# Patient Record
Sex: FEMALE | Race: WHITE | Employment: OTHER | URBAN - METROPOLITAN AREA
[De-identification: names, ages, dates, MRNs, and addresses within clinical notes are randomized per-mention and may not be internally consistent; named-entity substitution may affect disease eponyms.]

---

## 2017-04-04 ENCOUNTER — MYC MEDICAL ADVICE (OUTPATIENT)
Dept: FAMILY MEDICINE | Facility: CLINIC | Age: 62
End: 2017-04-04

## 2017-04-04 DIAGNOSIS — N64.4 PAIN OF LEFT BREAST: Primary | ICD-10-CM

## 2017-04-04 NOTE — TELEPHONE ENCOUNTER
CO review patient my chart message regarding left breast intermittent pain. Did have mammo 12/16.(negative) Family hx aunt with breast CA

## 2017-04-04 NOTE — TELEPHONE ENCOUNTER
"She is requesting to see a \"breast specialist.\"  We have breast surgeons, but this does not seem like a surgical issue yet.  Who would we refer her to?    "

## 2017-04-07 NOTE — TELEPHONE ENCOUNTER
CO review:  Triage entered the order as instructed by breast center:  Left breast diagnostic 3D mammo, and Left breast US. Please review these orders before sign off.  Arianna Pitts RN

## 2017-04-07 NOTE — TELEPHONE ENCOUNTER
Please call the breast center.  The 3D mammo is supposed to be the more sensitive mammo, but both a regular mammogram and 3D are considered screening mammograms.  If she is having breast pain, the radiologist would likely recommend a diagnostic mammogram.  Please check with the breast center/radiologist on this.  Thank you.

## 2017-04-19 ENCOUNTER — RADIANT APPOINTMENT (OUTPATIENT)
Dept: MAMMOGRAPHY | Facility: CLINIC | Age: 62
End: 2017-04-19
Attending: NURSE PRACTITIONER

## 2017-04-19 DIAGNOSIS — N64.4 PAIN OF LEFT BREAST: ICD-10-CM

## 2017-07-11 ENCOUNTER — OFFICE VISIT (OUTPATIENT)
Dept: FAMILY MEDICINE | Facility: CLINIC | Age: 62
End: 2017-07-11
Payer: COMMERCIAL

## 2017-07-11 VITALS
RESPIRATION RATE: 20 BRPM | DIASTOLIC BLOOD PRESSURE: 71 MMHG | BODY MASS INDEX: 32.96 KG/M2 | HEART RATE: 74 BPM | HEIGHT: 63 IN | TEMPERATURE: 97.9 F | SYSTOLIC BLOOD PRESSURE: 117 MMHG | WEIGHT: 186 LBS | OXYGEN SATURATION: 94 %

## 2017-07-11 DIAGNOSIS — R10.32 LLQ ABDOMINAL PAIN: Primary | ICD-10-CM

## 2017-07-11 PROCEDURE — 99213 OFFICE O/P EST LOW 20 MIN: CPT | Performed by: NURSE PRACTITIONER

## 2017-07-11 NOTE — PROGRESS NOTES
"  SUBJECTIVE:                                                    Valorie Hahn is a 61 year old female who presents to clinic today for the following health issues:      Abdominal Pain      Duration: 10 days     Description (location/character/radiation): approximately 10 days ago Valorie had a sudden onset of lower left sided abdominal pain. Years ago she had a kidney stone and she thought this was a kidney stone. She pushed fluids and the next day the pain was less. Since that time, she has had a persistent pain, low grade. She has felt \"clammy and crappy.\" the pain has not resolved. Today, the pain has been dull, intermittent when standing up but not when she is sitting or laying down.     Her pain is getting better. She almost canceled her appointment for today, but decided to keep it just to be checked.       Associated flank pain: None    Intensity:  10/10    Accompanying signs and symptoms:        Fever/Chills: no        Gas/Bloating: no        Nausea/vomitting: no        Diarrhea: no        Dysuria or Hematuria: no pain. No visible blood.     History (previous similar pain/trauma/previous testing): kidney stones before     Precipitating or alleviating factors:       Pain worse with eating/BM/urination: no- had no appetite afterwards        Pain relieved by BM: no     Therapies tried and outcome: None    LMP:  not applicable      Problem list and histories reviewed & adjusted, as indicated.  Additional history: as documented    Patient Active Problem List   Diagnosis     Herpes simplex vulvovaginitis     Asthma     CARDIOVASCULAR SCREENING; LDL GOAL LESS THAN 160     Family history of thyroid disease     Advanced directives, counseling/discussion     SI (sacroiliac) joint dysfunction     Osteitis, condensans     Sacroiliac joint disease     SI (sacroiliac) pain     Colon polyps     Chronic low back pain     Left hip pain     Dyspnea     Past Surgical History:   Procedure Laterality Date     C " APPENDECTOMY       C NONSPECIFIC PROCEDURE      Anal fissure repair     COLONOSCOPY      some polyps removed     HC TOOTH EXTRACTION W/FORCEP         Social History   Substance Use Topics     Smoking status: Former Smoker     Packs/day: 0.50     Years: 10.00     Types: Cigarettes     Start date: 1990     Quit date: 2000     Smokeless tobacco: Never Used     Alcohol use Yes      Comment: 1-2 glasses of wine per night     Family History   Problem Relation Age of Onset     DIABETES Father      HEART DISEASE Father      bypass surgeries x 2     Respiratory Mother      emphysema     Thyroid Disease Sister      hypothyroid     Thyroid Disease Sister      hypothyroid     Psychotic Disorder Sister      commited suicide, depression     CANCER Brother      esophogeal cancer, work exposure to chemicals     Blood Disease Brother       hiv  aids     Blood Disease Brother      hep c     Blood Disease Brother      hiv positive, passed away     Family History Negative Brother      DIABETES Brother      Mental Illness Mother      Mental Illness Maternal Grandmother      Mental Illness Sister      Thyroid Disease Mother      Thyroid Disease Sister      Thyroid Disease Mother      hypothyroid, takes supplement     Thyroid Disease Sister          Current Outpatient Prescriptions   Medication Sig Dispense Refill     acyclovir (ZOVIRAX) 400 MG tablet Take 1 tablet (400 mg) by mouth 3 times daily (Patient not taking: Reported on 2017) 60 tablet 12     albuterol (PROAIR HFA, PROVENTIL HFA, VENTOLIN HFA) 108 (90 BASE) MCG/ACT inhaler Inhale 2 puffs into the lungs every 4 hours as needed for shortness of breath / dyspnea (Patient not taking: Reported on 2017) 1 Inhaler 11     meloxicam (MOBIC) 7.5 MG tablet Take 1 tablet (7.5 mg) by mouth daily 90 tablet 3     cetirizine (ZYRTEC) 5 MG CHEW Take 5 mg by mouth daily       guaiFENesin-codeine (ROBITUSSIN AC) 100-10 MG/5ML SOLN Take 5-10 mLs by mouth every 4 hours as  "needed for cough 120 mL 0     Cholecalciferol (VITAMIN D3 PO) Take by mouth daily       Ibuprofen (IBU PO) Take  by mouth.       Allergies   Allergen Reactions     Penicillin G      Recent Labs   Lab Test  08/10/16   1106  06/16/15   1010   05/14/13   1217   LDL  123*  96   --   96   HDL  69  67   --   68   TRIG  114  125   --   95   CR   --   0.67   --    --    GFRESTIMATED   --   >90  Non  GFR Calc     --    --    GFRESTBLACK   --   >90   GFR Calc     --    --    POTASSIUM   --   4.2   --    --    TSH  3.08  2.68   < >   --     < > = values in this interval not displayed.      BP Readings from Last 3 Encounters:   07/11/17 117/71   10/03/16 115/78   08/10/16 110/70    Wt Readings from Last 3 Encounters:   07/11/17 186 lb (84.4 kg)   10/03/16 184 lb (83.5 kg)   08/10/16 181 lb (82.1 kg)                  Labs reviewed in EPIC    Reviewed and updated as needed this visit by clinical staff  Allergies       Reviewed and updated as needed this visit by Provider         ROS:  Constitutional, HEENT, cardiovascular, pulmonary, GI, , musculoskeletal, neuro, skin, endocrine and psych systems are negative, except as otherwise noted.    OBJECTIVE:     /71  Pulse 74  Temp 97.9  F (36.6  C) (Oral)  Resp 20  Ht 5' 3\" (1.6 m)  Wt 186 lb (84.4 kg)  LMP 02/14/2010  SpO2 94%  BMI 32.95 kg/m2  Body mass index is 32.95 kg/(m^2).  Constitutional: healthy, alert and no distress  Cardiovascular: RRR. No murmurs, clicks gallops or rub  Respiratory: Respirations easy and regular. No respiratory distress noted. Lung sounds clear to auscultation.  Gastrointestinal: abdomen flat, soft, nontender to light or deep palpation. Bowel sounds active in 4 quadrants. No hepatosplenomegaly. No rebound or guarding.  Neurologic: Gait normal. Reflexes normal and symmetric. Sensation grossly WNL.  Psychiatric: mentation appears normal and affect normal/bright      ASSESSMENT/PLAN:     (R10.32) LLQ abdominal " pain, improving  (primary encounter diagnosis)  Comment:   Plan:   I discussed and reviewed with the patient red flag symptoms and abdominal pain is concerning. Since her pain is improving today and she has no red flag symptoms, she will continue with her day-to-day management.  If she develops vomiting, blood in her stool, any new or concerning symptoms, she will be recheck/go to the Emergency Room.  In the meantime anticipate full resolution.  She declined/denies any labs including blood and urine test today for workup. She prefers to wait to see how things go.  She was appreciative and will follow-up with me as needed.      Patient Instructions     Diverticulitis    Some people get pouches along the wall of the colon as they get older. The pouches, called diverticuli, usually cause no symptoms. If the pouches become blocked, you can get an infection. This infection is called diverticulitis. It causes pain in your lower abdomen and fever. If not treated, it can become a serious condition, causing an abscess to form inside the pouch. The abscess may block the intestinal tract even or rupture, spreading infection throughout the abdomen.  When treatment is started early, oral antibiotics alone may be enough to cure diverticulitis. This method is tried first. But, if you don't improve or if your condition gets worse while using oral antibiotics, you may need to be admitted to the hospital for IV antibiotics. Severe cases may require surgery.  Home care  The following guidelines will help you care for yourself at home:    During the acute illness, rest and follow your healthcare provider's instructions about diet. Sometimes you will need to follow a clear liquid diet to rest your bowel. Once your symptoms are better, you may be told to follow a low-fiber diet for some time. Include foods like:    Flake cereal, mashed potatoes, pancakes, waffles, pasta, white bread, rice, applesauce, bananas, eggs, fish, poultry, tofu,  and cooked soft vegetables    Take antibiotics exactly as instructed. Don't miss any doses or stop taking the medication, even if you feel better.    Monitor your temperature and tell your healthcare provider if you have rising temperatures.  Preventing future attacks  Once you have an episode of diverticulitis, you are at risk for having it again. After you have recovered from this episode, you may be able to lower your risk by eating a high-fiber diet (20 gm/day to 35 gm/day of fiber). This cleans out the colon pouches that already exist and may prevent new ones from forming. Foods high in fiber include fresh fruits and edible peelings, raw or lightly cooked vegetables, whole grain cereals and breads, dried beans and peas, and bran.  Other steps that can help prevent future attacks include:    Take your medicines, such as antibiotics, as your healthcare provider says.    Drink 6 to 8 glasses of water every day, unless told otherwise.    Use a heating pad or hot water bottle to help abdominal cramping or pain.    Begin an exercise program. Ask your healthcare provider how to get started. You can benefit from simple activities such as walking or gardening.    Treat diarrhea with a bland diet. Start with liquids only; then slowly add fiber over time.    Watch for changes in your bowel movements (constipation to diarrhea). Avoid constipation by eating a high fiber diet and taking a stool softener if needed.    Get plenty of rest and sleep.  Follow-up care  Follow up with your healthcare provider as advised or sooner if you are not getting better in the next 2 days.  When to seek medical advice  Call your healthcare provider right away if any of these occur:    Fever of 100.4 F (38 C) or higher, or as directed by your healthcare provider    Repeated vomiting or swelling of the abdomen    Weakness, dizziness, light-headedness    Pain in your abdomen that gets worse, severe, or spreads to your back    Pain that moves to  the right lower abdomen    Rectal bleeding (stools that are red, black or maroon color)    Unexpected vaginal bleeding  Date Last Reviewed: 9/1/2016 2000-2017 [employer]. [address] [place] [address], [place] [address]. All rights reserved. This information is not intended as a substitute for professional medical care. Always follow your healthcare professional's instructions.              [facility]

## 2017-07-11 NOTE — PATIENT INSTRUCTIONS
Diverticulitis    Some people get pouches along the wall of the colon as they get older. The pouches, called diverticuli, usually cause no symptoms. If the pouches become blocked, you can get an infection. This infection is called diverticulitis. It causes pain in your lower abdomen and fever. If not treated, it can become a serious condition, causing an abscess to form inside the pouch. The abscess may block the intestinal tract even or rupture, spreading infection throughout the abdomen.  When treatment is started early, oral antibiotics alone may be enough to cure diverticulitis. This method is tried first. But, if you don't improve or if your condition gets worse while using oral antibiotics, you may need to be admitted to the hospital for IV antibiotics. Severe cases may require surgery.  Home care  The following guidelines will help you care for yourself at home:    During the acute illness, rest and follow your healthcare provider's instructions about diet. Sometimes you will need to follow a clear liquid diet to rest your bowel. Once your symptoms are better, you may be told to follow a low-fiber diet for some time. Include foods like:    Flake cereal, mashed potatoes, pancakes, waffles, pasta, white bread, rice, applesauce, bananas, eggs, fish, poultry, tofu, and cooked soft vegetables    Take antibiotics exactly as instructed. Don't miss any doses or stop taking the medication, even if you feel better.    Monitor your temperature and tell your healthcare provider if you have rising temperatures.  Preventing future attacks  Once you have an episode of diverticulitis, you are at risk for having it again. After you have recovered from this episode, you may be able to lower your risk by eating a high-fiber diet (20 gm/day to 35 gm/day of fiber). This cleans out the colon pouches that already exist and may prevent new ones from forming. Foods high in fiber include fresh fruits and edible peelings, raw or  lightly cooked vegetables, whole grain cereals and breads, dried beans and peas, and bran.  Other steps that can help prevent future attacks include:    Take your medicines, such as antibiotics, as your healthcare provider says.    Drink 6 to 8 glasses of water every day, unless told otherwise.    Use a heating pad or hot water bottle to help abdominal cramping or pain.    Begin an exercise program. Ask your healthcare provider how to get started. You can benefit from simple activities such as walking or gardening.    Treat diarrhea with a bland diet. Start with liquids only; then slowly add fiber over time.    Watch for changes in your bowel movements (constipation to diarrhea). Avoid constipation by eating a high fiber diet and taking a stool softener if needed.    Get plenty of rest and sleep.  Follow-up care  Follow up with your healthcare provider as advised or sooner if you are not getting better in the next 2 days.  When to seek medical advice  Call your healthcare provider right away if any of these occur:    Fever of 100.4 F (38 C) or higher, or as directed by your healthcare provider    Repeated vomiting or swelling of the abdomen    Weakness, dizziness, light-headedness    Pain in your abdomen that gets worse, severe, or spreads to your back    Pain that moves to the right lower abdomen    Rectal bleeding (stools that are red, black or maroon color)    Unexpected vaginal bleeding  Date Last Reviewed: 9/1/2016 2000-2017 [employer]. [address] [place] [address]. All rights reserved. This information is not intended as a substitute for professional medical care. Always follow your healthcare professional's instructions.

## 2017-07-11 NOTE — NURSING NOTE
"Chief Complaint   Patient presents with     Abdominal Pain       Initial /71  Pulse 74  Temp 97.9  F (36.6  C) (Oral)  Resp 20  Ht 5' 3\" (1.6 m)  Wt 186 lb (84.4 kg)  LMP 02/14/2010  SpO2 94%  BMI 32.95 kg/m2 Estimated body mass index is 32.95 kg/(m^2) as calculated from the following:    Height as of this encounter: 5' 3\" (1.6 m).    Weight as of this encounter: 186 lb (84.4 kg).  Medication Reconciliation: complete     Yuri Rodriguez MA     "

## 2017-07-11 NOTE — MR AVS SNAPSHOT
After Visit Summary   7/11/2017    Valorie Hahn    MRN: [id number]           Patient Information     Date Of Birth          1955        Visit Information        Provider Department      7/11/2017 3:00 PM Lisbeth Ruiz APRN [facility]        Care Instructions      Diverticulitis    Some people get pouches along the wall of the colon as they get older. The pouches, called diverticuli, usually cause no symptoms. If the pouches become blocked, you can get an infection. This infection is called diverticulitis. It causes pain in your lower abdomen and fever. If not treated, it can become a serious condition, causing an abscess to form inside the pouch. The abscess may block the intestinal tract even or rupture, spreading infection throughout the abdomen.  When treatment is started early, oral antibiotics alone may be enough to cure diverticulitis. This method is tried first. But, if you don't improve or if your condition gets worse while using oral antibiotics, you may need to be admitted to the hospital for IV antibiotics. Severe cases may require surgery.  Home care  The following guidelines will help you care for yourself at home:    During the acute illness, rest and follow your healthcare provider's instructions about diet. Sometimes you will need to follow a clear liquid diet to rest your bowel. Once your symptoms are better, you may be told to follow a low-fiber diet for some time. Include foods like:    Flake cereal, mashed potatoes, pancakes, waffles, pasta, white bread, rice, applesauce, bananas, eggs, fish, poultry, tofu, and cooked soft vegetables    Take antibiotics exactly as instructed. Don't miss any doses or stop taking the medication, even if you feel better.    Monitor your temperature and tell your healthcare provider if you have rising temperatures.  Preventing future attacks  Once you have an episode of diverticulitis, you are at risk for  having it again. After you have recovered from this episode, you may be able to lower your risk by eating a high-fiber diet (20 gm/day to 35 gm/day of fiber). This cleans out the colon pouches that already exist and may prevent new ones from forming. Foods high in fiber include fresh fruits and edible peelings, raw or lightly cooked vegetables, whole grain cereals and breads, dried beans and peas, and bran.  Other steps that can help prevent future attacks include:    Take your medicines, such as antibiotics, as your healthcare provider says.    Drink 6 to 8 glasses of water every day, unless told otherwise.    Use a heating pad or hot water bottle to help abdominal cramping or pain.    Begin an exercise program. Ask your healthcare provider how to get started. You can benefit from simple activities such as walking or gardening.    Treat diarrhea with a bland diet. Start with liquids only; then slowly add fiber over time.    Watch for changes in your bowel movements (constipation to diarrhea). Avoid constipation by eating a high fiber diet and taking a stool softener if needed.    Get plenty of rest and sleep.  Follow-up care  Follow up with your healthcare provider as advised or sooner if you are not getting better in the next 2 days.  When to seek medical advice  Call your healthcare provider right away if any of these occur:    Fever of 100.4 F (38 C) or higher, or as directed by your healthcare provider    Repeated vomiting or swelling of the abdomen    Weakness, dizziness, light-headedness    Pain in your abdomen that gets worse, severe, or spreads to your back    Pain that moves to the right lower abdomen    Rectal bleeding (stools that are red, black or maroon color)    Unexpected vaginal bleeding  Date Last Reviewed: 9/1/2016 2000-2017 [employer]. [address] [place] [address], [place] [address]. All rights reserved. This information is not intended as a substitute for professional medical care.  "Always follow your healthcare professional's instructions.                Follow-ups after your visit        Who to contact     If you have questions or need follow up information about today's clinic visit or your schedule please contact [facility] directly at [phone].  Normal or non-critical lab and imaging results will be communicated to you by MyChart, letter or phone within 4 business days after the clinic has received the results. If you do not hear from us within 7 days, please contact the clinic through [employer] or phone. If you have a critical or abnormal lab result, we will notify you by phone as soon as possible.  Submit refill requests through [employer] or call your pharmacy and they will forward the refill request to us. Please allow 3 business days for your refill to be completed.          Additional Information About Your Visit        [employer] Information     [employer] gives you secure access to your electronic health record. If you see a primary care provider, you can also send messages to your care team and make appointments. If you have questions, please call your primary care clinic.  If you do not have a primary care provider, please call [phone] and they will assist you.        Care EveryWhere ID     This is your Care EveryWhere ID. This could be used by other organizations to access your [place] medical records  [id number]        Your Vitals Were     Pulse Temperature Respirations Height Last Period Pulse Oximetry    74 97.9  F (36.6  C) (Oral) 20 5' 3\" (1.6 m) 02/14/2010 94%    BMI (Body Mass Index)                   32.95 kg/m2            Blood Pressure from Last 3 Encounters:   07/11/17 117/71   10/03/16 115/78   08/10/16 110/70    Weight from Last 3 Encounters:   07/11/17 186 lb (84.4 kg)   10/03/16 184 lb (83.5 kg)   08/10/16 181 lb (82.1 kg)              Today, you had the following     No orders found for display         Today's Medication Changes        "   These changes are accurate as of: 7/11/17  3:23 PM.  If you have any questions, ask your nurse or doctor.               Stop taking these medicines if you haven't already. Please contact your care team if you have questions.     fluticasone 110 MCG/ACT Inhaler   Commonly known as:  FLOVENT HFA                    Primary Care Provider Office Phone # Fax #    [facility] [phone]       [facility] [address] [place] [address]        Equal Access to Services     [facility]: Hadii aad ku hadasho Soomaali, waaxda luqadaha, qaybta kaalmada adeegyada, waxay pam bangura . So [facility] [phone].    ATENCIÓN: Si habla español, tiene a mas disposición servicios gratuitos de asistencia lingüística. [facility] [phone].    We comply with applicable federal civil rights laws and [place] laws. We do not discriminate on the basis of race, color, national origin, age, disability sex, sexual orientation or gender identity.            Thank you!     Thank you for choosing [facility]  for your care. Our goal is always to provide you with excellent care. Hearing back from our patients is one way we can continue to improve our services. Please take a few minutes to complete the written survey that you may receive in the mail after your visit with us. Thank you!             Your Updated Medication List - Protect others around you: Learn how to safely use, store and throw away your medicines at www.disposemymeds.org.          This list is accurate as of: 7/11/17  3:23 PM.  Always use your most recent med list.                   Brand Name Dispense Instructions for use Diagnosis    acyclovir 400 MG tablet    ZOVIRAX    60 tablet    Take 1 tablet (400 mg) by mouth 3 times daily    Herpes simplex vulvovaginitis       albuterol 108 (90 BASE) MCG/ACT Inhaler    PROAIR HFA/PROVENTIL HFA/VENTOLIN HFA    1 Inhaler    Inhale 2 puffs  into the lungs every 4 hours as needed for shortness of breath / dyspnea    Mild intermittent asthma without complication       cetirizine 5 MG Chew    zyrTEC     Take 5 mg by mouth daily        guaiFENesin-codeine 100-10 MG/5ML Soln solution    ROBITUSSIN AC    120 mL    Take 5-10 mLs by mouth every 4 hours as needed for cough    Acute sinusitis with symptoms > 10 days       IBU PO      Take  by mouth.        meloxicam 7.5 MG tablet    MOBIC    90 tablet    Take 1 tablet (7.5 mg) by mouth daily    SI (sacroiliac) joint dysfunction       VITAMIN D3 PO      Take by mouth daily

## 2017-07-12 ASSESSMENT — ASTHMA QUESTIONNAIRES: ACT_TOTALSCORE: 24

## 2017-08-02 ENCOUNTER — OFFICE VISIT (OUTPATIENT)
Dept: PEDIATRICS | Facility: CLINIC | Age: 62
End: 2017-08-02
Payer: COMMERCIAL

## 2017-08-02 VITALS
HEIGHT: 63 IN | HEART RATE: 72 BPM | OXYGEN SATURATION: 95 % | BODY MASS INDEX: 32.67 KG/M2 | TEMPERATURE: 98 F | DIASTOLIC BLOOD PRESSURE: 66 MMHG | SYSTOLIC BLOOD PRESSURE: 118 MMHG | WEIGHT: 184.4 LBS

## 2017-08-02 DIAGNOSIS — Z87.442 H/O RENAL CALCULI: ICD-10-CM

## 2017-08-02 DIAGNOSIS — R10.9 LEFT FLANK PAIN: ICD-10-CM

## 2017-08-02 DIAGNOSIS — R10.32 LLQ ABDOMINAL PAIN: Primary | ICD-10-CM

## 2017-08-02 DIAGNOSIS — J45.909 UNCOMPLICATED ASTHMA, UNSPECIFIED ASTHMA SEVERITY: ICD-10-CM

## 2017-08-02 LAB
ALBUMIN SERPL-MCNC: 4 G/DL (ref 3.4–5)
ALBUMIN UR-MCNC: NEGATIVE MG/DL
ALP SERPL-CCNC: 69 U/L (ref 40–150)
ALT SERPL W P-5'-P-CCNC: 21 U/L (ref 0–50)
ANION GAP SERPL CALCULATED.3IONS-SCNC: 3 MMOL/L (ref 3–14)
APPEARANCE UR: CLEAR
AST SERPL W P-5'-P-CCNC: 22 U/L (ref 0–45)
BASOPHILS # BLD AUTO: 0 10E9/L (ref 0–0.2)
BASOPHILS NFR BLD AUTO: 0.5 %
BILIRUB SERPL-MCNC: 1.1 MG/DL (ref 0.2–1.3)
BILIRUB UR QL STRIP: NEGATIVE
BUN SERPL-MCNC: 11 MG/DL (ref 7–30)
CALCIUM SERPL-MCNC: 9.2 MG/DL (ref 8.5–10.1)
CHLORIDE SERPL-SCNC: 104 MMOL/L (ref 94–109)
CO2 SERPL-SCNC: 27 MMOL/L (ref 20–32)
COLOR UR AUTO: YELLOW
CREAT SERPL-MCNC: 0.6 MG/DL (ref 0.52–1.04)
CRP SERPL-MCNC: <2.9 MG/L (ref 0–8)
DIFFERENTIAL METHOD BLD: NORMAL
EOSINOPHIL # BLD AUTO: 0.2 10E9/L (ref 0–0.7)
EOSINOPHIL NFR BLD AUTO: 3.8 %
ERYTHROCYTE [DISTWIDTH] IN BLOOD BY AUTOMATED COUNT: 11.3 % (ref 10–15)
ERYTHROCYTE [SEDIMENTATION RATE] IN BLOOD BY WESTERGREN METHOD: 8 MM/H (ref 0–30)
GFR SERPL CREATININE-BSD FRML MDRD: >90 ML/MIN/1.7M2
GLUCOSE SERPL-MCNC: 89 MG/DL (ref 70–99)
GLUCOSE UR STRIP-MCNC: NEGATIVE MG/DL
HCT VFR BLD AUTO: 39.9 % (ref 35–47)
HGB BLD-MCNC: 13.8 G/DL (ref 11.7–15.7)
HGB UR QL STRIP: NEGATIVE
KETONES UR STRIP-MCNC: NEGATIVE MG/DL
LEUKOCYTE ESTERASE UR QL STRIP: NEGATIVE
LYMPHOCYTES # BLD AUTO: 2.3 10E9/L (ref 0.8–5.3)
LYMPHOCYTES NFR BLD AUTO: 37.1 %
MCH RBC QN AUTO: 31.6 PG (ref 26.5–33)
MCHC RBC AUTO-ENTMCNC: 34.6 G/DL (ref 31.5–36.5)
MCV RBC AUTO: 91 FL (ref 78–100)
MONOCYTES # BLD AUTO: 0.4 10E9/L (ref 0–1.3)
MONOCYTES NFR BLD AUTO: 7 %
NEUTROPHILS # BLD AUTO: 3.2 10E9/L (ref 1.6–8.3)
NEUTROPHILS NFR BLD AUTO: 51.6 %
NITRATE UR QL: NEGATIVE
PH UR STRIP: 6 PH (ref 5–7)
PLATELET # BLD AUTO: 229 10E9/L (ref 150–450)
POTASSIUM SERPL-SCNC: 3.9 MMOL/L (ref 3.4–5.3)
PROT SERPL-MCNC: 7.2 G/DL (ref 6.8–8.8)
RBC # BLD AUTO: 4.37 10E12/L (ref 3.8–5.2)
SODIUM SERPL-SCNC: 134 MMOL/L (ref 133–144)
SP GR UR STRIP: <=1.005 (ref 1–1.03)
URN SPEC COLLECT METH UR: NORMAL
UROBILINOGEN UR STRIP-ACNC: 0.2 EU/DL (ref 0.2–1)
WBC # BLD AUTO: 6.3 10E9/L (ref 4–11)

## 2017-08-02 PROCEDURE — 85652 RBC SED RATE AUTOMATED: CPT | Performed by: PHYSICIAN ASSISTANT

## 2017-08-02 PROCEDURE — 80053 COMPREHEN METABOLIC PANEL: CPT | Performed by: PHYSICIAN ASSISTANT

## 2017-08-02 PROCEDURE — 36415 COLL VENOUS BLD VENIPUNCTURE: CPT | Performed by: PHYSICIAN ASSISTANT

## 2017-08-02 PROCEDURE — 85025 COMPLETE CBC W/AUTO DIFF WBC: CPT | Performed by: PHYSICIAN ASSISTANT

## 2017-08-02 PROCEDURE — 99214 OFFICE O/P EST MOD 30 MIN: CPT | Performed by: PHYSICIAN ASSISTANT

## 2017-08-02 PROCEDURE — 81003 URINALYSIS AUTO W/O SCOPE: CPT | Performed by: PHYSICIAN ASSISTANT

## 2017-08-02 PROCEDURE — 86140 C-REACTIVE PROTEIN: CPT | Performed by: PHYSICIAN ASSISTANT

## 2017-08-02 RX ORDER — TAMSULOSIN HYDROCHLORIDE 0.4 MG/1
0.4 CAPSULE ORAL DAILY
Qty: 15 CAPSULE | Refills: 0 | Status: SHIPPED | OUTPATIENT
Start: 2017-08-02 | End: 2018-04-17

## 2017-08-02 RX ORDER — TRAMADOL HYDROCHLORIDE 50 MG/1
50 TABLET ORAL EVERY 6 HOURS PRN
Qty: 15 TABLET | Refills: 0 | Status: SHIPPED | OUTPATIENT
Start: 2017-08-02 | End: 2018-04-17

## 2017-08-02 RX ORDER — HYDROCODONE BITARTRATE AND ACETAMINOPHEN 5; 325 MG/1; MG/1
1 TABLET ORAL EVERY 4 HOURS PRN
Qty: 15 TABLET | Refills: 0 | Status: SHIPPED | OUTPATIENT
Start: 2017-08-02 | End: 2017-08-02

## 2017-08-02 ASSESSMENT — ANXIETY QUESTIONNAIRES
6. BECOMING EASILY ANNOYED OR IRRITABLE: SEVERAL DAYS
5. BEING SO RESTLESS THAT IT IS HARD TO SIT STILL: NOT AT ALL
GAD7 TOTAL SCORE: 5
1. FEELING NERVOUS, ANXIOUS, OR ON EDGE: SEVERAL DAYS
2. NOT BEING ABLE TO STOP OR CONTROL WORRYING: SEVERAL DAYS
IF YOU CHECKED OFF ANY PROBLEMS ON THIS QUESTIONNAIRE, HOW DIFFICULT HAVE THESE PROBLEMS MADE IT FOR YOU TO DO YOUR WORK, TAKE CARE OF THINGS AT HOME, OR GET ALONG WITH OTHER PEOPLE: NOT DIFFICULT AT ALL
7. FEELING AFRAID AS IF SOMETHING AWFUL MIGHT HAPPEN: NOT AT ALL
3. WORRYING TOO MUCH ABOUT DIFFERENT THINGS: SEVERAL DAYS

## 2017-08-02 ASSESSMENT — PATIENT HEALTH QUESTIONNAIRE - PHQ9: 5. POOR APPETITE OR OVEREATING: SEVERAL DAYS

## 2017-08-02 NOTE — NURSING NOTE
"Chief Complaint   Patient presents with     Abdominal Pain     lower left quadrant       Initial /66 (BP Location: Right arm, Patient Position: Chair, Cuff Size: Adult Regular)  Pulse 72  Temp 98  F (36.7  C) (Oral)  Ht 5' 3\" (1.6 m)  Wt 184 lb 6.4 oz (83.6 kg)  LMP 02/14/2010  SpO2 95%  BMI 32.66 kg/m2 Estimated body mass index is 32.66 kg/(m^2) as calculated from the following:    Height as of this encounter: 5' 3\" (1.6 m).    Weight as of this encounter: 184 lb 6.4 oz (83.6 kg).  Medication Reconciliation: complete   Teetee Aguilera MA      "

## 2017-08-02 NOTE — PATIENT INSTRUCTIONS
Increase fluid intake  Begin flomax daily  Begin vicodin as needed    Call in 3-5 days if symptoms persist

## 2017-08-02 NOTE — LETTER
My Asthma Action Plan  Name: Valorie Hahn   YOB: 1955  Date: 8/2/2017   My doctor: Catracho Denis PA-C   My clinic: [facility]        My Control Medicine: N/A  My Rescue Medicine: Albuterol (Proair/Ventolin/Proventil) inhaler 108 (90 Base)   My Asthma Severity: Asthma  Avoid your asthma triggers: Patient is aware of triggers               GREEN ZONE   Good Control    I feel good    No cough or wheeze    Can work, sleep and play without asthma symptoms       Take your asthma control medicine every day.     1. If exercise triggers your asthma, take your rescue medication    15 minutes before exercise or sports, and    During exercise if you have asthma symptoms  2. Spacer to use with inhaler: If you have a spacer, make sure to use it with your inhaler             YELLOW ZONE Getting Worse  I have ANY of these:    I do not feel good    Cough or wheeze    Chest feels tight    Wake up at night   1. Keep taking your Green Zone medications  2. Start taking your rescue medicine:    every 20 minutes for up to 1 hour. Then every 4 hours for 24-48 hours.  3. If you stay in the Yellow Zone for more than 12-24 hours, contact your doctor.  4. If you do not return to the Green Zone in 12-24 hours or you get worse, start taking your oral steroid medicine if prescribed by your provider.           RED ZONE Medical Alert - Get Help  I have ANY of these:    I feel awful    Medicine is not helping    Breathing getting harder    Trouble walking or talking    Nose opens wide to breathe       1. Take your rescue medicine NOW  2. If your provider has prescribed an oral steroid medicine, start taking it NOW  3. Call your doctor NOW  4. If you are still in the Red Zone after 20 minutes and you have not reached your doctor:    Take your rescue medicine again and    Call 911 or go to the emergency room right away    See your regular doctor within 2 weeks of an Emergency Room or Urgent Care visit  for follow-up treatment.        Electronically signed by: Teetee Aguilera, August 2, 2017    Annual Reminders:  Meet with Asthma Educator,  Flu Shot in the Fall, consider Pneumonia Vaccination for patients with asthma (aged 19 and older).    [facility] [address] [address] [address]                    Asthma Triggers  How To Control Things That Make Your Asthma Worse    Triggers are things that make your asthma worse.  Look at the list below to help you find your triggers and what you can do about them.  You can help prevent asthma flare-ups by staying away from your triggers.      Trigger                                                          What you can do   Cigarette Smoke  Tobacco smoke can make asthma worse. Do not allow smoking in your home, car or around you.  Be sure no one smokes at a child s day care or school.  If you smoke, ask your health care provider for ways to help you quit.  Ask family members to quit too.  Ask your health care provider for a referral to Quit Plan to help you quit smoking, or call 7-687-403-PLAN.     Colds, Flu, Bronchitis  These are common triggers of asthma. Wash your hands often.  Don t touch your eyes, nose or mouth.  Get a flu shot every year.     Dust Mites  These are tiny bugs that live in cloth or carpet. They are too small to see. Wash sheets and blankets in hot water every week.   Encase pillows and mattress in dust mite proof covers.  Avoid having carpet if you can. If you have carpet, vacuum weekly.   Use a dust mask and HEPA vacuum.   Pollen and Outdoor Mold  Some people are allergic to trees, grass, or weed pollen, or molds. Try to keep your windows closed.  Limit time out doors when pollen count is high.   Ask you health care provider about taking medicine during allergy season.     Animal Dander  Some people are allergic to skin flakes, urine or saliva from pets with fur or feathers. Keep pets  with fur or feathers out of your home.    If you can t keep the pet outdoors, then keep the pet out of your bedroom.  Keep the bedroom door closed.  Keep pets off cloth furniture and away from stuffed toys.     Mice, Rats, and Cockroaches  Some people are allergic to the waste from these pests.   Cover food and garbage.  Clean up spills and food crumbs.  Store grease in the refrigerator.   Keep food out of the bedroom.   Indoor Mold  This can be a trigger if your home has high moisture. Fix leaking faucets, pipes, or other sources of water.   Clean moldy surfaces.  Dehumidify basement if it is damp and smelly.   Smoke, Strong Odors, and Sprays  These can reduce air quality. Stay away from strong odors and sprays, such as perfume, powder, hair spray, paints, smoke incense, paint, cleaning products, candles and new carpet.   Exercise or Sports  Some people with asthma have this trigger. Be active!  Ask your doctor about taking medicine before sports or exercise to prevent symptoms.    Warm up for 5-10 minutes before and after sports or exercise.     Other Triggers of Asthma  Cold air:  Cover your nose and mouth with a scarf.  Sometimes laughing or crying can be a trigger.  Some medicines and food can trigger asthma.

## 2017-08-02 NOTE — MR AVS SNAPSHOT
After Visit Summary   8/2/2017    Valorie Hahn    MRN: [id number]           Patient Information     Date Of Birth          1955        Visit Information        Provider Department      8/2/2017 2:30 PM Catracho Denis PA-C [facility]        Today's Diagnoses     Asthma    -  1    LLQ abdominal pain        Left flank pain          Care Instructions    Increase fluid intake  Begin flomax daily  Begin vicodin as needed    Call in 3-5 days if symptoms persist          Follow-ups after your visit        Who to contact     If you have questions or need follow up information about today's clinic visit or your schedule [facility] at [phone].  Normal or non-critical lab and imaging results will be communicated to you by Soundtrackerhart, letter or phone within 4 business days after the clinic has received the results. If you do not hear from us within 7 days, please contact the clinic through Soundtrackerhart or phone. If you have a critical or abnormal lab result, we will notify you by phone as soon as possible.  Submit refill requests through linkedÃ¼ or call your pharmacy and they will forward the refill request to us. Please allow 3 business days for your refill to be completed.          Additional Information About Your Visit        MyChart Information     linkedÃ¼ gives you secure access to your electronic health record. If you see a primary care provider, you can also send messages to your care team and make appointments. If you have questions, please call your primary care clinic.  If you do not have a primary care provider, please call [phone] and they will assist you.        Care EveryWhere ID     This is your Care EveryWhere ID. This could be used by other organizations to access your [facility] medical records  [id number]        Your Vitals Were     Pulse Temperature Height Last Period Pulse Oximetry BMI (Body Mass Index)    72 98  F  "(36.7  C) (Oral) 5' 3\" (1.6 m) 02/14/2010 95% 32.66 kg/m2       Blood Pressure from Last 3 Encounters:   08/02/17 118/66   07/11/17 117/71   10/03/16 115/78    Weight from Last 3 Encounters:   08/02/17 184 lb 6.4 oz (83.6 kg)   07/11/17 186 lb (84.4 kg)   10/03/16 184 lb (83.5 kg)              We Performed the Following     *UA reflex to Microscopic     Asthma Action Plan (AAP)     CBC with platelets differential     Comprehensive metabolic panel     CRP inflammation     Erythrocyte sedimentation rate auto          Today's Medication Changes          These changes are accurate as of: 8/2/17  3:36 PM.  If you have any questions, ask your nurse or doctor.               Start taking these medicines.        Dose/Directions    HYDROcodone-acetaminophen 5-325 MG per tablet   Commonly known as:  NORCO   Used for:  LLQ abdominal pain, Left flank pain   Started by:  Catracho Denis PA-C        Dose:  1 tablet   Take 1 tablet by mouth every 4 hours as needed for pain   Quantity:  15 tablet   Refills:  0       tamsulosin 0.4 MG capsule   Commonly known as:  FLOMAX   Used for:  LLQ abdominal pain   Started by:  Catracho Denis PA-C        Dose:  0.4 mg   Take 1 capsule (0.4 mg) by mouth daily   Quantity:  15 capsule   Refills:  0            Where to get your medicines      [facility] [address] [place] [id number]    Hours:  24-hours Phone:  [phone]     tamsulosin 0.4 MG capsule         Some of these will need a paper prescription and others can be bought over the counter.  Ask your nurse if you have questions.     Bring a paper prescription for each of these medications     HYDROcodone-acetaminophen 5-325 MG per tablet                Primary Care Provider Office Phone # Fax #    BC Benedict [phone]       [facility] " [address] [place] [address]        Equal Access to Services     ELISEOFREDERICK TRUDI : Hadii aad ku hadannabelleo Soalvaali, waaxda luqadaha, qaybta kaalmada davidjavier, waxxochitl lelain hayaajeramie hernandezjudit kaisermaria sandoval. So [facility] [phone].    ATENCIÓN: Si habla español, tiene a mas disposición servicios gratuitos de asistencia lingüística. [facility] [phone].    We comply with applicable federal civil rights laws and [place] laws. We do not discriminate on the basis of race, color, national origin, age, disability sex, sexual orientation or gender identity.            Thank you!     Thank you for choosing [facility]  for your care. Our goal is always to provide you with excellent care. Hearing back from our patients is one way we can continue to improve our services. Please take a few minutes to complete the written survey that you may receive in the mail after your visit with us. Thank you!             Your Updated Medication List - Protect others around you: Learn how to safely use, store and throw away your medicines at www.disposemymeds.org.          This list is accurate as of: 8/2/17  3:36 PM.  Always use your most recent med list.                   Brand Name Dispense Instructions for use Diagnosis    acyclovir 400 MG tablet    ZOVIRAX    60 tablet    Take 1 tablet (400 mg) by mouth 3 times daily    Herpes simplex vulvovaginitis       albuterol 108 (90 BASE) MCG/ACT Inhaler    PROAIR HFA/PROVENTIL HFA/VENTOLIN HFA    1 Inhaler    Inhale 2 puffs into the lungs every 4 hours as needed for shortness of breath / dyspnea    Mild intermittent asthma without complication       cetirizine 5 MG Chew    zyrTEC     Take 5 mg by mouth daily        guaiFENesin-codeine 100-10 MG/5ML Soln solution    ROBITUSSIN AC    120 mL    Take 5-10 mLs by mouth every 4 hours as needed for cough    Acute sinusitis with symptoms > 10 days       HYDROcodone-acetaminophen 5-325 MG per tablet    NORCO    15 tablet    Take 1  tablet by mouth every 4 hours as needed for pain    LLQ abdominal pain, Left flank pain       IBU PO      Take  by mouth.        meloxicam 7.5 MG tablet    MOBIC    90 tablet    Take 1 tablet (7.5 mg) by mouth daily    SI (sacroiliac) joint dysfunction       tamsulosin 0.4 MG capsule    FLOMAX    15 capsule    Take 1 capsule (0.4 mg) by mouth daily    LLQ abdominal pain       VITAMIN D3 PO      Take by mouth daily

## 2017-08-02 NOTE — PROGRESS NOTES
"  SUBJECTIVE:                                                    Valorie Hahn is a 61 year old female who presents to clinic today for the following health issues:    ABDOMINAL and FLANK PAIN     Onset:1 day ago-7pm originally started 3 weeks ago but went away    Description:   Character: aching  Location: left lower quadrant; left flank  Radiation: Back and Pelvic region  Last night severe pain    Intensity: moderate; last night 10/10; sitting 0/10; immediate pain with standing    Progression of Symptoms:  same    Accompanying Signs & Symptoms:  Fever/Chills?: no   Gas/Bloating: no   Nausea: yes  Vomitting: no   Diarrhea?: no   Constipation:no   Dysuria or Hematuria: no    History:   Trauma: no   Previous similar pain: YES- history of kidney stones and similar pain 3 weeks ago   Previous tests done: none    Precipitating factors:   Does the pain change with:     Food: no -haven't eaten since yesterday    BM: no     Urination: no   Standing is worse than sitting    Alleviating factors:  siting    Therapies Tried and outcome: sitting and Advil at 8 am    LMP:  not applicable     History of kidney stones. History of diverticula on colonoscopy.   S/p appendectomy. No history of gallstones, gout, ulcers, gastritis, pancreatitis, diverticulitis. No bowel obstructions.     ROS:  C: NEGATIVE for fever, chills  E/M: NEGATIVE for ear, mouth and throat problems  R: NEGATIVE for significant cough or SOB  CV: NEGATIVE for chest pain  GI: POSITIVE for LLQ, L flank pain, nausea  MUSCULOSKELETAL: NEGATIVE for significant arthralgias or myalgia  NEURO: NEGATIVE for weakness, dizziness or paresthesias    OBJECTIVE:                                                    /66 (BP Location: Right arm, Patient Position: Chair, Cuff Size: Adult Regular)  Pulse 72  Temp 98  F (36.7  C) (Oral)  Ht 5' 3\" (1.6 m)  Wt 184 lb 6.4 oz (83.6 kg)  LMP 02/14/2010  SpO2 95%  BMI 32.66 kg/m2  Body mass index is 32.66 kg/(m^2). "   GENERAL: alert, no distress  HENT: Mouth- no ulcers, no lesions  NECK: no tenderness, no adenopathy  RESP: lungs clear to auscultation - no rales, no rhonchi, no wheezes  CV: regular rates and rhythm, normal S1 S2, no S3 or S4 and no murmur, no click or rub  ABDOMEN: soft, LLQ tenderness, no  hepatosplenomegaly, no masses, normal bowel sounds  BACK: no CVAT    Diagnostic test results:  No results found for this or any previous visit (from the past 24 hour(s)).       ASSESSMENT/PLAN:                                                    (R10.32) LLQ abdominal pain  (primary encounter diagnosis)  Comment: likely renal lithiasis. Proceed with increased fluids, flomax and tramadol PRN pain. If symptoms persist in 5-7 days, call clinic and will proceed with imaging.   Plan: tamsulosin (FLOMAX) 0.4 MG capsule, traMADol         (ULTRAM) 50 MG tablet, DISCONTINUED:         HYDROcodone-acetaminophen (NORCO) 5-325 MG per         tablet            (R10.9) Left flank pain  Comment:   Plan: CBC with platelets differential, Erythrocyte         sedimentation rate auto, *UA reflex to         Microscopic, Comprehensive metabolic panel, CRP        inflammation, traMADol (ULTRAM) 50 MG tablet,         DISCONTINUED: HYDROcodone-acetaminophen (NORCO)        5-325 MG per tablet            (Z87.442) H/O renal calculi  Comment:   Plan:       (J45.909) Asthma  Comment:   Plan: Asthma Action Plan (AAP)            See Patient Instructions    Catracho Denis PA-C  [facility]

## 2017-08-03 ASSESSMENT — ANXIETY QUESTIONNAIRES: GAD7 TOTAL SCORE: 5

## 2017-08-03 ASSESSMENT — PATIENT HEALTH QUESTIONNAIRE - PHQ9: SUM OF ALL RESPONSES TO PHQ QUESTIONS 1-9: 0

## 2017-08-20 ENCOUNTER — OFFICE VISIT (OUTPATIENT)
Dept: URGENT CARE | Facility: URGENT CARE | Age: 62
End: 2017-08-20
Payer: COMMERCIAL

## 2017-08-20 VITALS
SYSTOLIC BLOOD PRESSURE: 135 MMHG | BODY MASS INDEX: 32.06 KG/M2 | WEIGHT: 181 LBS | HEART RATE: 67 BPM | DIASTOLIC BLOOD PRESSURE: 73 MMHG | OXYGEN SATURATION: 96 % | TEMPERATURE: 97.6 F

## 2017-08-20 DIAGNOSIS — R19.7 DIARRHEA, UNSPECIFIED TYPE: ICD-10-CM

## 2017-08-20 DIAGNOSIS — R21 RASH AND NONSPECIFIC SKIN ERUPTION: Primary | ICD-10-CM

## 2017-08-20 PROCEDURE — 99213 OFFICE O/P EST LOW 20 MIN: CPT | Performed by: FAMILY MEDICINE

## 2017-08-20 RX ORDER — METHYLPREDNISOLONE 4 MG
TABLET, DOSE PACK ORAL
Qty: 21 TABLET | Refills: 0 | Status: SHIPPED | OUTPATIENT
Start: 2017-08-20 | End: 2018-04-17

## 2017-08-20 NOTE — MR AVS SNAPSHOT
After Visit Summary   8/20/2017    Valorie Hahn    MRN: [id number]           Patient Information     Date Of Birth          1955        Visit Information        Provider Department      8/20/2017 10:15 AM Irene Wylie DO [facility]        Today's Diagnoses     Rash and nonspecific skin eruption    -  1      Care Instructions    If you have any itching:  Consider using calamine lotion; antihistamines (benadryl is sedating and may be best for home/night time, claritin and zyrtec are non sedating); and, avoid hot water in your baths/showers.    If any fevers or worsening symptoms develop, recheck with your primary provider.   If not starting to resolve over this week, recheck with your primary provider.              Follow-ups after your visit        Who to contact     If you have questions or need follow up information about today's clinic visit or your schedule please contact [facility] directly at [phone].  Normal or non-critical lab and imaging results will be communicated to you by Waterline Data Sciencehart, letter or phone within 4 business days after the clinic has received the results. If you do not hear from us within 7 days, please contact the clinic through [employer] or phone. If you have a critical or abnormal lab result, we will notify you by phone as soon as possible.  Submit refill requests through [employer] or call your pharmacy and they will forward the refill request to us. Please allow 3 business days for your refill to be completed.          Additional Information About Your Visit        Waterline Data ScienceharFunambol Information     [employer] gives you secure access to your electronic health record. If you see a primary care provider, you can also send messages to your care team and make appointments. If you have questions, please call your primary care clinic.  If you do not have a primary care provider, please call [phone] and they will assist  you.        Care EveryWhere ID     This is your Care EveryWhere ID. This could be used by other organizations to access your [facility] medical records  [id number]        Your Vitals Were     Pulse Temperature Last Period Pulse Oximetry BMI (Body Mass Index)       67 97.6  F (36.4  C) (Tympanic) 02/14/2010 96% 32.06 kg/m2        Blood Pressure from Last 3 Encounters:   08/20/17 135/73   08/02/17 118/66   07/11/17 117/71    Weight from Last 3 Encounters:   08/20/17 181 lb (82.1 kg)   08/02/17 184 lb 6.4 oz (83.6 kg)   07/11/17 186 lb (84.4 kg)              Today, you had the following     No orders found for display         Today's Medication Changes          These changes are accurate as of: 8/20/17 10:47 AM.  If you have any questions, ask your nurse or doctor.               Start taking these medicines.        Dose/Directions    methylPREDNISolone 4 MG tablet   Commonly known as:  MEDROL DOSEPAK   Used for:  Rash and nonspecific skin eruption   Started by:  Irene Wylie, DO        Follow package instructions   Quantity:  21 tablet   Refills:  0            Where to get your medicines      Some of these will need a paper prescription and others can be bought over the counter.  Ask your nurse if you have questions.     Bring a paper prescription for each of these medications     methylPREDNISolone 4 MG tablet                Primary Care Provider Office Phone # Fax #    BC Benedict [facility] [phone] [address] [place] [address]        Equal Access to Services     FREDERICK BRUSH AH: Hadii payal ku hadasho Soomaali, waaxda luqadaha, qaybta kaalmada miltonyada, adry sandoval. So [facility] [phone].    ATENCIÓN: Si habla español, tiene a mas disposición servicios gratuitos de asistencia lingüística. Llame al [phone].    We comply with applicable federal civil rights laws and [place] laws. We do not discriminate on the basis of race,  color, national origin, age, disability sex, sexual orientation or gender identity.            Thank you!     Thank you for choosing [facility]  for your care. Our goal is always to provide you with excellent care. Hearing back from our patients is one way we can continue to improve our services. Please take a few minutes to complete the written survey that you may receive in the mail after your visit with us. Thank you!             Your Updated Medication List - Protect others around you: Learn how to safely use, store and throw away your medicines at www.disposemymeds.org.          This list is accurate as of: 8/20/17 10:47 AM.  Always use your most recent med list.                   Brand Name Dispense Instructions for use Diagnosis    acyclovir 400 MG tablet    ZOVIRAX    60 tablet    Take 1 tablet (400 mg) by mouth 3 times daily    Herpes simplex vulvovaginitis       albuterol 108 (90 BASE) MCG/ACT Inhaler    PROAIR HFA/PROVENTIL HFA/VENTOLIN HFA    1 Inhaler    Inhale 2 puffs into the lungs every 4 hours as needed for shortness of breath / dyspnea    Mild intermittent asthma without complication       cetirizine 5 MG Chew    zyrTEC     Take 5 mg by mouth daily        guaiFENesin-codeine 100-10 MG/5ML Soln solution    ROBITUSSIN AC    120 mL    Take 5-10 mLs by mouth every 4 hours as needed for cough    Acute sinusitis with symptoms > 10 days       IBU PO      Take  by mouth.        meloxicam 7.5 MG tablet    MOBIC    90 tablet    Take 1 tablet (7.5 mg) by mouth daily    SI (sacroiliac) joint dysfunction       methylPREDNISolone 4 MG tablet    MEDROL DOSEPAK    21 tablet    Follow package instructions    Rash and nonspecific skin eruption       tamsulosin 0.4 MG capsule    FLOMAX    15 capsule    Take 1 capsule (0.4 mg) by mouth daily    LLQ abdominal pain       traMADol 50 MG tablet    ULTRAM    15 tablet    Take 1 tablet (50 mg) by mouth every 6 hours as needed for moderate pain     Left flank pain, LLQ abdominal pain       VITAMIN D3 PO      Take by mouth daily

## 2017-08-20 NOTE — PATIENT INSTRUCTIONS
If you have any itching:  Consider using calamine lotion; antihistamines (benadryl is sedating and may be best for home/night time, claritin and zyrtec are non sedating); and, avoid hot water in your baths/showers.    If any fevers or worsening symptoms develop, recheck with your primary provider.   If not starting to resolve over this week, recheck with your primary provider.

## 2017-08-20 NOTE — PROGRESS NOTES
SUBJECTIVE:   Valorie Hahn is a 61 year old female presenting with a chief complaint of rash.  Woke up yesterday with raised red dots all over, itching.  No hives noted.  Has been feeling a little nauseated, some diarrhea, fatigue and poor appetite as well since yesterday.  No vomiting.  No fevers.   Took an antihistamine yesterday, but not really any change in the rash.  Has been working in the yard/garden.  No new topical products, detergents or clothing recalled.  No new foods.  No swelling of lips/tongue.  No breathing concerns.  Up to date on shots.    ROS:  5 point review of symptoms negative other than positives stated above.    OBJECTIVE  /73  Pulse 67  Temp 97.6  F (36.4  C) (Tympanic)  Wt 181 lb (82.1 kg)  LMP 02/14/2010  SpO2 96%  BMI 32.06 kg/m2  GENERAL:  Awake, alert and interactive. No acute distress.  SKIN:  Generalized raised red lesions with surrounding halo/hypopigmentation.  No swelling of lips, tongue appears normal.  Oropharynx benign.      ASSESSMENT/PLAN    ICD-10-CM    1. Rash and nonspecific skin eruption R21 methylPREDNISolone (MEDROL DOSEPAK) 4 MG tablet   2. Diarrhea, unspecified type R19.7      Unclear etiology for rash, may be due to something from yard/garden.  Discussed steroids, she prefers to hold off as not that itchy/bothersome.  Has prescription to hold/fill if needed.   Symptomatic cares for the gi symptoms.   F/u if any worsening symptoms or not improving as expected over the next several days.  Patient Instructions   If you have any itching:  Consider using calamine lotion; antihistamines (benadryl is sedating and may be best for home/night time, claritin and zyrtec are non sedating); and, avoid hot water in your baths/showers.    If any fevers or worsening symptoms develop, recheck with your primary provider.   If not starting to resolve over this week, recheck with your primary provider.

## 2017-08-20 NOTE — NURSING NOTE
"Chief Complaint   Patient presents with     Urgent Care     Pt in clinic to have eval for rash on legs, face, arms, chest, hands, and back for 2 days.     Derm Problem       Initial /73  Pulse 67  Temp 97.6  F (36.4  C) (Tympanic)  Wt 181 lb (82.1 kg)  LMP 02/14/2010  SpO2 96%  BMI 32.06 kg/m2 Estimated body mass index is 32.06 kg/(m^2) as calculated from the following:    Height as of 8/2/17: 5' 3\" (1.6 m).    Weight as of this encounter: 181 lb (82.1 kg).  Medication Reconciliation: complete   Gerri Whyte/ MA    "

## 2017-12-06 ENCOUNTER — OFFICE VISIT (OUTPATIENT)
Dept: ORTHOPEDICS | Facility: CLINIC | Age: 62
End: 2017-12-06

## 2017-12-06 VITALS — RESPIRATION RATE: 16 BRPM | HEIGHT: 63 IN | WEIGHT: 187.2 LBS | BODY MASS INDEX: 33.17 KG/M2

## 2017-12-06 DIAGNOSIS — M79.671 INTRACTABLE RIGHT HEEL PAIN: ICD-10-CM

## 2017-12-06 DIAGNOSIS — G89.29 CHRONIC PAIN OF RIGHT ANKLE: Primary | ICD-10-CM

## 2017-12-06 DIAGNOSIS — M25.571 CHRONIC PAIN OF RIGHT ANKLE: Primary | ICD-10-CM

## 2017-12-06 NOTE — MR AVS SNAPSHOT
After Visit Summary   12/6/2017    Valorie Hahn    MRN: [id number]           Patient Information     Date Of Birth          1955        Visit Information        Provider Department      12/6/2017 1:15 PM Ivet Ross MD [facility]        Today's Diagnoses     Chronic pain of right ankle    -  1    Intractable right heel pain           Follow-ups after your visit        Follow-up notes from your care team     Return in about 1 week (around 12/13/2017), or if symptoms worsen or fail to improve.      Your next 10 appointments already scheduled     Jan 03, 2018 10:45 AM CST   (Arrive by 10:30 AM)   MR LOWER EXTREMITY JOINT RIGHT W/O CONTRAST with [id number]   [facility] [place] [facility] ([facility] [place])    [address] [address] [phone]           Take your medicines as usual, unless your doctor tells you not to. Bring a list of your current medicines to your exam (including vitamins, minerals and over-the-counter drugs). Also bring the results of similar scans you may have had.  Please remove any body piercings and hair extensions before you arrive.  Follow your doctor s orders. If you do not, we may have to postpone your exam.  You will not have contrast for this exam. You do not need to do anything special to prepare.  The MRI machine uses a strong magnet. Please wear clothes without metal (snaps, zippers). A sweatsuit works well, or we may give you a hospital gown.   **IMPORTANT** THE INSTRUCTIONS BELOW ARE ONLY FOR THOSE PATIENTS WHO HAVE BEEN TOLD THEY WILL RECEIVE SEDATION OR GENERAL ANESTHESIA DURING THEIR MRI PROCEDURE:  IF YOU WILL RECEIVE SEDATION (take medicine to help you relax during your exam):   You must get the medicine from your doctor before you arrive. Bring the medicine to the exam. Do not take it at home.   Arrive one hour early. Bring someone who can take you home after the test.  Your medicine will make you sleepy. After the exam, you may not drive, take a bus or take a taxi by yourself.   No eating 8 hours before your exam. You may have clear liquids up until 4 hours before your exam. (Clear liquids include water, clear tea, black coffee and fruit juice without pulp.)  IF YOU WILL RECEIVE ANESTHESIA (be asleep for your exam):   Arrive 1 1/2 hours early. Bring someone who can take you home after the test. You may not drive, take a bus or take a taxi by yourself.   No eating 8 hours before your exam. You may have clear liquids up until 4 hours before your exam. (Clear liquids include water, clear tea, black coffee and fruit juice without pulp.)   You will spend four to five hours in the recovery room.  Please call the Imaging Department at your exam site with any questions.            Jan 05, 2018 11:00 AM CST   (Arrive by 10:45 AM)   Return Visit with Ivet Ross MD   [facility] ([facility])    [address] [place] [address] [phone]              Future tests that were ordered for you today     Open Future Orders        Priority Expected Expires Ordered    MRI Lower extremity joint w/o contrast rt* Routine  12/6/2018 12/6/2017            Who to contact     Please call your clinic at [phone] to:    Ask questions about your health    Make or cancel appointments    Discuss your medicines    Learn about your test results    Speak to your doctor   If you have compliments or concerns about an experience at your clinic, or if you wish to file a complaint, please contact [facility] Patient Relations at [phone] or email us at [email or web address]         Additional Information About Your Visit        MyChart Information     [employer] gives you secure access to your electronic health record. If you see a primary care provider, you can also send messages to your care  "team and make appointments. If you have questions, please call your primary care clinic.  If you do not have a primary care provider, please call [phone] and they will assist you.      [employer] is an electronic gateway that provides easy, online access to your medical records. With [employer], you can request a clinic appointment, read your test results, renew a prescription or communicate with your care team.     To access your existing account, please contact your [facility] or call [phone] for assistance.        Care EveryWhere ID     This is your Care EveryWhere ID. This could be used by other organizations to access your [facility] medical records  [id number]        Your Vitals Were     Respirations Height Last Period BMI (Body Mass Index)          16 5' 3\" (1.6 m) 02/14/2010 33.16 kg/m2         Blood Pressure from Last 3 Encounters:   08/20/17 135/73   08/02/17 118/66   07/11/17 117/71    Weight from Last 3 Encounters:   12/06/17 187 lb 3.2 oz (84.9 kg)   08/20/17 181 lb (82.1 kg)   08/02/17 184 lb 6.4 oz (83.6 kg)               Primary Care Provider Office Phone # Fax #    [facility] [phone] [address] [place] [address]        Equal Access to Services     CARY BRUSH AH: Hadii payal ku hadasho Sotrell, waaxda luqadaha, qaybta kaalmada miltonyada, adry bangura . So [facility] [phone].    ATENCIÓN: Si habla español, tiene a mas disposición servicios gratuitos de asistencia lingüística. Dick al [phone].    We comply with applicable federal civil rights laws and [place] laws. We do not discriminate on the basis of race, color, national origin, age, disability, sex, sexual orientation, or gender identity.            Thank you!     Thank you for choosing [facility]  for your care. Our goal is always to provide you with excellent care. Hearing back from our patients is one " way we can continue to improve our services. Please take a few minutes to complete the written survey that you may receive in the mail after your visit with us. Thank you!             Your Updated Medication List - Protect others around you: Learn how to safely use, store and throw away your medicines at www.disposemymeds.org.          This list is accurate as of: 12/6/17 11:59 PM.  Always use your most recent med list.                   Brand Name Dispense Instructions for use Diagnosis    acyclovir 400 MG tablet    ZOVIRAX    60 tablet    Take 1 tablet (400 mg) by mouth 3 times daily    Herpes simplex vulvovaginitis       albuterol 108 (90 BASE) MCG/ACT Inhaler    PROAIR HFA/PROVENTIL HFA/VENTOLIN HFA    1 Inhaler    Inhale 2 puffs into the lungs every 4 hours as needed for shortness of breath / dyspnea    Mild intermittent asthma without complication       cetirizine 5 MG Chew    zyrTEC     Take 5 mg by mouth daily        guaiFENesin-codeine 100-10 MG/5ML Soln solution    ROBITUSSIN AC    120 mL    Take 5-10 mLs by mouth every 4 hours as needed for cough    Acute sinusitis with symptoms > 10 days       IBU PO      Take  by mouth.        meloxicam 7.5 MG tablet    MOBIC    90 tablet    Take 1 tablet (7.5 mg) by mouth daily    SI (sacroiliac) joint dysfunction       methylPREDNISolone 4 MG tablet    MEDROL DOSEPAK    21 tablet    Follow package instructions    Rash and nonspecific skin eruption       tamsulosin 0.4 MG capsule    FLOMAX    15 capsule    Take 1 capsule (0.4 mg) by mouth daily    LLQ abdominal pain       traMADol 50 MG tablet    ULTRAM    15 tablet    Take 1 tablet (50 mg) by mouth every 6 hours as needed for moderate pain    Left flank pain, LLQ abdominal pain       VITAMIN D3 PO      Take by mouth daily

## 2017-12-06 NOTE — LETTER
12/6/2017      RE: Valorie Hahn  [address]  [facility] [address]        Subjective:   Valorie Hahn is a 62 year old female who is c/o right heel/achilles pain. 2 years ago, jumping from rock to rock when she landed in a hyper-dorsiflexed position.  Thought it was a calf string.  Felt pain the next morning when see took her first step. Pain gets progressively worse throughout the day. Swelling in posterolateral ankle. Bump in achilles tendon.   Had cortisone injection into heel by Podiatrist.  Seen at [address] [place] [address]- plantar fascitis dx.  Never went to PT. 2 years later.    Quit tennis, riding bike ok.  Stationary biking.  Wants to get back moving.  Night splint- am the foot is good, can't put weight onto the heel.  Mom in memory care, for a while other issues on back burner.  Now wants to play tennis and can't do it.  Wants to go back, can't stand at standing desk.  X-ray normal in the past at [facility]  Super feet in every pair, [facility] inserts for hikers.  October hiking again on steep trail, 100 steps.  No thyroid issues- happens in family.  Checked last year was OK.  No diabetes.    Background:   Date of injury: 2 years ago  Duration of symptoms: 2 years  Mechanism of Injury: Acute; Activity Related Hiking  Intensity: 0/10 at rest, 5/10 with activity   Aggravating factors: Dorsiflexion, walking on concrete  Relieving Factors: Sleeping in foam boot  Prior Evaluation: Prior Physician Evalutation: [facility], xrays    PAST MEDICAL, SOCIAL, SURGICAL AND FAMILY HISTORY: She  has a past medical history of Arthritis (2012); Esophageal reflux; Genital herpes, unspecified; and Mild intermittent asthma with exacerbation. She also has no past medical history of Cancer (H); Cerebral infarction (H); Congestive heart failure, unspecified; COPD (chronic obstructive pulmonary disease) (H); Depressive disorder; Diabetes (H); Heart disease; History of blood transfusion; Hypertension; or Thyroid  "disease.  She  has a past surgical history that includes NONSPECIFIC PROCEDURE; APPENDECTOMY; TOOTH EXTRACTION W/FORCEP; and colonoscopy.  Her family history includes Blood Disease in her brother, brother, and brother; CANCER in her brother; DIABETES in her brother and father; Family History Negative in her brother; HEART DISEASE in her father; Mental Illness in her maternal grandmother, mother, and sister; Psychotic Disorder in her sister; Respiratory in her mother; Thyroid Disease in her mother, mother, sister, sister, sister, and sister.  She reports that she quit smoking about 17 years ago. Her smoking use included Cigarettes. She started smoking about 27 years ago. She has a 5.00 pack-year smoking history. She has never used smokeless tobacco. She reports that she drinks alcohol. She reports that she does not use illicit drugs.      ALLERGIES: She is allergic to penicillin g.    CURRENT MEDICATIONS: She has a current medication list which includes the following prescription(s): methylprednisolone, cetirizine, cholecalciferol, ibuprofen, tamsulosin, tramadol, acyclovir, albuterol, meloxicam, and guaifenesin-codeine.     REVIEW OF SYSTEMS: 10 point review of systems is negative except as noted above.     Exam:   Resp 16  Ht 5' 3\" (1.6 m)  Wt 187 lb 3.2 oz (84.9 kg)  LMP 02/14/2010  BMI 33.16 kg/m2           CONSTITUTIONAL: alert, no distress, cooperative and obese  HEAD: Normocephalic. No masses, lesions, tenderness or abnormalities  SKIN: no suspicious lesions or rashes  GAIT: antalgic  NEUROLOGIC: Non-focal, Normal muscle tone and strength, reflexes normal, sensation grossly normal.  PSYCHIATRIC: affect normal/bright and mentation appears normal.    MUSCULOSKELETAL: right heel pain    ANKLE  Inspection/Palpation:     Swelling: mild swelling, noticeable swelling posterior achilles/calf      Non-tender: ATFL, CFL, PTFL, medial malleolus, deltoid ligament, anterior tib-fib ligament, no obvious achilles tendon " defect   Range of Motion: dorsiflexion: decreased, plantarflexion: full, inversion: full, eversion: full  Strength:dorsiflexion: 5-/5, plantarflexion: 5/5, inversion: 5-/5, eversion: 5/5   Special tests: negative anterior drawer, negative varus stress, negative valgus stress, negative forced external rotation, negative Arroyo sign     FOOT  Inspection/Palpation:      Swelling: mild swelling  Tender: calcaneous- achilles insertion, plantar fascia     Non-tender: promixal 5th metatarsal, 1st, 2nd, 3rd, 4th, 5th metatarsals, cuboid,  navicular, cuneiform lateral, cuneiform middle, cuneiform medial, metatarsal heads, peroneal tendon: at lateral malleolus, at cuboid, at proximal 5th metatarsal, posterior tibial tendon at medial malleolus, posterior tibial tendon at navicular  Range of Motion: flexion of toes: full, extension of toes: full         Assessment/Plan:   Pt is a 61 yo white female with PMHx of SI joint dysfunction presenting with right heel pain, right Achilles tendon  1. Right Achilles tendonitis, possible calf defect, heel pain- MRI ordered  Pt to use ice, massage  Encounter Diagnoses   Name Primary?     Chronic pain of right ankle Yes     Intractable right heel pain      RTC after MRI, consider PT    X-RAY INTERPRETATION:   Previous imaging at [facility]

## 2017-12-06 NOTE — PROGRESS NOTES
Subjective:   Valorie Hahn is a 62 year old female who is c/o right heel/achilles pain. 2 years ago, jumping from rock to rock when she landed in a hyper-dorsiflexed position.  Thought it was a calf string.  Felt pain the next morning when see took her first step. Pain gets progressively worse throughout the day. Swelling in posterolateral ankle. Bump in achilles tendon.   Had cortisone injection into heel by Podiatrist.  Seen at [address] [place] [address]- plantar fascitis dx.  Never went to PT. 2 years later.    Quit tennis, riding bike ok.  Stationary biking.  Wants to get back moving.  Night splint- am the foot is good, can't put weight onto the heel.  Mom in memory care, for a while other issues on back burner.  Now wants to play tennis and can't do it.  Wants to go back, can't stand at standing desk.  X-ray normal in the past at [facility]  [facility] pair[facility].  October hiking again on steep trail, 100 steps.  No thyroid issues- happens in family.  Checked last year was OK.  No diabetes.    Background:   Date of injury: 2 years ago  Duration of symptoms: 2 years  Mechanism of Injury: Acute; Activity Related Hiking  Intensity: 0/10 at rest, 5/10 with activity   Aggravating factors: Dorsiflexion, walking on concrete  Relieving Factors: Sleeping in foam boot  Prior Evaluation: Prior Physician Evalutation: [facility], xrays    PAST MEDICAL, SOCIAL, SURGICAL AND FAMILY HISTORY: She  has a past medical history of Arthritis (2012); Esophageal reflux; Genital herpes, unspecified; and Mild intermittent asthma with exacerbation. She also has no past medical history of Cancer (H); Cerebral infarction (H); Congestive heart failure, unspecified; COPD (chronic obstructive pulmonary disease) (H); Depressive disorder; Diabetes (H); Heart disease; History of blood transfusion; Hypertension; or Thyroid disease.  She  has a past surgical history that includes NONSPECIFIC PROCEDURE; APPENDECTOMY; TOOTH  "EXTRACTION W/FORCEP; and colonoscopy.  Her family history includes Blood Disease in her brother, brother, and brother; CANCER in her brother; DIABETES in her brother and father; Family History Negative in her brother; HEART DISEASE in her father; Mental Illness in her maternal grandmother, mother, and sister; Psychotic Disorder in her sister; Respiratory in her mother; Thyroid Disease in her mother, mother, sister, sister, sister, and sister.  She reports that she quit smoking about 17 years ago. Her smoking use included Cigarettes. She started smoking about 27 years ago. She has a 5.00 pack-year smoking history. She has never used smokeless tobacco. She reports that she drinks alcohol. She reports that she does not use illicit drugs.      ALLERGIES: She is allergic to penicillin g.    CURRENT MEDICATIONS: She has a current medication list which includes the following prescription(s): methylprednisolone, cetirizine, cholecalciferol, ibuprofen, tamsulosin, tramadol, acyclovir, albuterol, meloxicam, and guaifenesin-codeine.     REVIEW OF SYSTEMS: 10 point review of systems is negative except as noted above.     Exam:   Resp 16  Ht 5' 3\" (1.6 m)  Wt 187 lb 3.2 oz (84.9 kg)  LMP 02/14/2010  BMI 33.16 kg/m2           CONSTITUTIONAL: alert, no distress, cooperative and obese  HEAD: Normocephalic. No masses, lesions, tenderness or abnormalities  SKIN: no suspicious lesions or rashes  GAIT: antalgic  NEUROLOGIC: Non-focal, Normal muscle tone and strength, reflexes normal, sensation grossly normal.  PSYCHIATRIC: affect normal/bright and mentation appears normal.    MUSCULOSKELETAL: right heel pain    ANKLE  Inspection/Palpation:     Swelling: mild swelling, noticeable swelling posterior achilles/calf      Non-tender: ATFL, CFL, PTFL, medial malleolus, deltoid ligament, anterior tib-fib ligament, no obvious achilles tendon defect   Range of Motion: dorsiflexion: decreased, plantarflexion: full, inversion: full, eversion: " full  Strength:dorsiflexion: 5-/5, plantarflexion: 5/5, inversion: 5-/5, eversion: 5/5   Special tests: negative anterior drawer, negative varus stress, negative valgus stress, negative forced external rotation, negative Arroyo sign     FOOT  Inspection/Palpation:      Swelling: mild swelling  Tender: calcaneous- achilles insertion, plantar fascia     Non-tender: promixal 5th metatarsal, 1st, 2nd, 3rd, 4th, 5th metatarsals, cuboid,  navicular, cuneiform lateral, cuneiform middle, cuneiform medial, metatarsal heads, peroneal tendon: at lateral malleolus, at cuboid, at proximal 5th metatarsal, posterior tibial tendon at medial malleolus, posterior tibial tendon at navicular  Range of Motion: flexion of toes: full, extension of toes: full         Assessment/Plan:   Pt is a 61 yo white female with PMHx of SI joint dysfunction presenting with right heel pain, right Achilles tendon  1. Right Achilles tendonitis, possible calf defect, heel pain- MRI ordered  Pt to use ice, massage  Encounter Diagnoses   Name Primary?     Chronic pain of right ankle Yes     Intractable right heel pain      RTC after MRI, consider PT    X-RAY INTERPRETATION:   Previous imaging at [facility]

## 2018-01-03 ENCOUNTER — RADIANT APPOINTMENT (OUTPATIENT)
Dept: MRI IMAGING | Facility: CLINIC | Age: 63
End: 2018-01-03
Attending: FAMILY MEDICINE
Payer: COMMERCIAL

## 2018-01-03 DIAGNOSIS — M25.571 CHRONIC PAIN OF RIGHT ANKLE: ICD-10-CM

## 2018-01-03 DIAGNOSIS — M79.671 INTRACTABLE RIGHT HEEL PAIN: ICD-10-CM

## 2018-01-03 DIAGNOSIS — G89.29 CHRONIC PAIN OF RIGHT ANKLE: ICD-10-CM

## 2018-01-05 ENCOUNTER — OFFICE VISIT (OUTPATIENT)
Dept: ORTHOPEDICS | Facility: CLINIC | Age: 63
End: 2018-01-05
Payer: COMMERCIAL

## 2018-01-05 VITALS — WEIGHT: 187 LBS | HEIGHT: 63 IN | BODY MASS INDEX: 33.13 KG/M2 | RESPIRATION RATE: 16 BRPM

## 2018-01-05 DIAGNOSIS — M25.571 CHRONIC PAIN OF RIGHT ANKLE: Primary | ICD-10-CM

## 2018-01-05 DIAGNOSIS — G89.29 CHRONIC PAIN OF RIGHT ANKLE: Primary | ICD-10-CM

## 2018-01-05 NOTE — PROGRESS NOTES
Subjective:   Valorie Hahn is a 62 year old female who is f/u for right foot MRI. Hasn't driven much and the ankle is a lot better.  Driving all the time, [place] every 5-6 days to care for her Mother.  Motion  [facility]- Dr. Kapadia  Sent to PT for plantar fascititis  High deductible plan- had outside x-ray when injury first happened.  Stationary bike is fine.      Date of injury: 2 years ago  Date last seen: 12/6/2017  Following Therapeutic Plan: Yes   Pain: Unchanged  Function: Unchanged  Interval History: MRI    PAST MEDICAL, SOCIAL, SURGICAL AND FAMILY HISTORY: She  has a past medical history of Arthritis (2012); Esophageal reflux; Genital herpes, unspecified; and Mild intermittent asthma with exacerbation. She also has no past medical history of Cancer (H); Cerebral infarction (H); Congestive heart failure, unspecified; COPD (chronic obstructive pulmonary disease) (H); Depressive disorder; Diabetes (H); Heart disease; History of blood transfusion; Hypertension; or Thyroid disease.  She  has a past surgical history that includes NONSPECIFIC PROCEDURE; APPENDECTOMY; TOOTH EXTRACTION W/FORCEP; and colonoscopy.  Her family history includes Blood Disease in her brother, brother, and brother; CANCER in her brother; DIABETES in her brother and father; Family History Negative in her brother; HEART DISEASE in her father; Mental Illness in her maternal grandmother, mother, and sister; Psychotic Disorder in her sister; Respiratory in her mother; Thyroid Disease in her mother, mother, sister, sister, sister, and sister.  She reports that she quit smoking about 17 years ago. Her smoking use included Cigarettes. She started smoking about 28 years ago. She has a 5.00 pack-year smoking history. She has never used smokeless tobacco. She reports that she drinks alcohol. She reports that she does not use illicit drugs.      ALLERGIES: She is allergic to penicillin g.    CURRENT MEDICATIONS: She  "has a current medication list which includes the following prescription(s): albuterol, fluticasone, methylprednisolone, cetirizine, cholecalciferol, ibuprofen, acyclovir, tamsulosin, tramadol, acyclovir, meloxicam, and guaifenesin-codeine.     REVIEW OF SYSTEMS: 10 point review of systems is negative except as noted above.     Exam:   Resp 16  Ht 5' 3\" (1.6 m)  Wt 187 lb (84.8 kg)  LMP 02/14/2010  BMI 33.13 kg/m2           CONSTITUTIONAL: alert, no distress, cooperative and obese  HEAD: Normocephalic. No masses, lesions, tenderness or abnormalities  SKIN: no suspicious lesions or rashes  GAIT: antalgic  NEUROLOGIC: Non-focal, Normal muscle tone and strength, reflexes normal, sensation grossly normal.  PSYCHIATRIC: affect normal/bright and mentation appears normal.    MUSCULOSKELETAL: right ankle pain- lateral pain    ANKLE  Inspection/Palpation:     Swelling: no swelling   Tender: swelling of lateral ankle     Non-tender: ATFL, CFL, PTFL, medial malleolus, deltoid ligament, anterior tib-fib ligament, achilles  tendon, no achilles tendon defect   Range of Motion: dorsiflexion: full, plantarflexion: full, inversion: full, eversion: full  Strength:dorsiflexion: 5/5, plantarflexion: 5/5, inversion: 5/5, eversion: 5/5   Special tests: negative anterior drawer, negative varus stress, negative valgus stress, negative forced external rotation, negative Arroyo sign     FOOT  Inspection/Palpation:      Swelling: mild swelling     Non-tender: promixal 5th metatarsal, 1st, 2nd, 3rd, 4th, 5th metatarsals, calcaneous, cuboid,  navicular, cuneiform lateral, cuneiform middle, cuneiform medial, metatarsal heads, peroneal tendon: at lateral malleolus, at cuboid, at proximal 5th metatarsal, posterior tibial tendon at medial malleolus, posterior tibial tendon at navicular, plantar fascia  Range of Motion: flexion of toes: full, extension of toes: full         Assessment/Plan:   Pt is a 63 yo obese white female with PMHx of SI " joint pain presenting in f/u for MRI results  1. Right lateral ankle pain- swelling over peroneal tendons- peroneal tendonitis, tear of peroneal brevis on MRI- trial of PT  Pt wants to avoid surgery.  Waiting this long due to poor insurance coverage.  Pt will go get x-ray at initial location, she doesn't want to pay for updated films  RTC 6 weeks    X-RAY INTERPRETATION:   MRI of the Right Ankle: ordered and interpreted in the office today was positive for Impression:     1. Osteochondral lesions in the medial talar dome and tibial plafond,  as evidenced by focal chondral thinning/loss and subchondral cystic  change with associated small to moderate ankle joint effusion which  decompresses posteriorly into the pre-Achilles fat, communicates with  the flexor hallucis longus tendon sheath, and extends into the region  of the deep deltoid ligament.  2. Longitudinal split tear of the peroneus brevis with associated mild  peroneus longus tendinopathy.  3. Small plantar calcaneal enthesophyte with associated findings of  mild plantar fasciitis.  4. Minimal posterior tibial tenosynovitis.

## 2018-01-05 NOTE — LETTER
1/5/2018      RE: Valorie Hahn  [address]  [facility] [id number]        Subjective:   Valorie Hahn is a 62 year old female who is f/u for right foot MRI. Hasn't driven much and the ankle is a lot better.  Driving all the time, [place] every 5-6 days to care for her Mother.  Motion  [facility]- Dr. Kapadia  Sent to PT for plantar fascititis  High deductible plan- had outside x-ray when injury first happened.  Stationary bike is fine.      Date of injury: 2 years ago  Date last seen: 12/6/2017  Following Therapeutic Plan: Yes   Pain: Unchanged  Function: Unchanged  Interval History: MRI    PAST MEDICAL, SOCIAL, SURGICAL AND FAMILY HISTORY: She  has a past medical history of Arthritis (2012); Esophageal reflux; Genital herpes, unspecified; and Mild intermittent asthma with exacerbation. She also has no past medical history of Cancer (H); Cerebral infarction (H); Congestive heart failure, unspecified; COPD (chronic obstructive pulmonary disease) (H); Depressive disorder; Diabetes (H); Heart disease; History of blood transfusion; Hypertension; or Thyroid disease.  She  has a past surgical history that includes NONSPECIFIC PROCEDURE; APPENDECTOMY; TOOTH EXTRACTION W/FORCEP; and colonoscopy.  Her family history includes Blood Disease in her brother, brother, and brother; CANCER in her brother; DIABETES in her brother and father; Family History Negative in her brother; HEART DISEASE in her father; Mental Illness in her maternal grandmother, mother, and sister; Psychotic Disorder in her sister; Respiratory in her mother; Thyroid Disease in her mother, mother, sister, sister, sister, and sister.  She reports that she quit smoking about 17 years ago. Her smoking use included Cigarettes. She started smoking about 28 years ago. She has a 5.00 pack-year smoking history. She has never used smokeless tobacco. She reports that she drinks alcohol. She reports that she does  "not use illicit drugs.      ALLERGIES: She is allergic to penicillin g.    CURRENT MEDICATIONS: She has a current medication list which includes the following prescription(s): albuterol, fluticasone, methylprednisolone, cetirizine, cholecalciferol, ibuprofen, acyclovir, tamsulosin, tramadol, acyclovir, meloxicam, and guaifenesin-codeine.     REVIEW OF SYSTEMS: 10 point review of systems is negative except as noted above.     Exam:   Resp 16  Ht 5' 3\" (1.6 m)  Wt 187 lb (84.8 kg)  LMP 02/14/2010  BMI 33.13 kg/m2           CONSTITUTIONAL: alert, no distress, cooperative and obese  HEAD: Normocephalic. No masses, lesions, tenderness or abnormalities  SKIN: no suspicious lesions or rashes  GAIT: antalgic  NEUROLOGIC: Non-focal, Normal muscle tone and strength, reflexes normal, sensation grossly normal.  PSYCHIATRIC: affect normal/bright and mentation appears normal.    MUSCULOSKELETAL: right ankle pain- lateral pain    ANKLE  Inspection/Palpation:     Swelling: no swelling   Tender: swelling of lateral ankle     Non-tender: ATFL, CFL, PTFL, medial malleolus, deltoid ligament, anterior tib-fib ligament, achilles  tendon, no achilles tendon defect   Range of Motion: dorsiflexion: full, plantarflexion: full, inversion: full, eversion: full  Strength:dorsiflexion: 5/5, plantarflexion: 5/5, inversion: 5/5, eversion: 5/5   Special tests: negative anterior drawer, negative varus stress, negative valgus stress, negative forced external rotation, negative Arroyo sign     FOOT  Inspection/Palpation:      Swelling: mild swelling     Non-tender: promixal 5th metatarsal, 1st, 2nd, 3rd, 4th, 5th metatarsals, calcaneous, cuboid,  navicular, cuneiform lateral, cuneiform middle, cuneiform medial, metatarsal heads, peroneal tendon: at lateral malleolus, at cuboid, at proximal 5th metatarsal, posterior tibial tendon at medial malleolus, posterior tibial tendon at navicular, plantar fascia  Range of Motion: flexion of toes: full, " extension of toes: full         Assessment/Plan:   Pt is a 61 yo obese white female with PMHx of SI joint pain presenting in f/u for MRI results  1. Right lateral ankle pain- swelling over peroneal tendons- peroneal tendonitis, tear of peroneal brevis on MRI- trial of PT  Pt wants to avoid surgery.  Waiting this long due to poor insurance coverage.  Pt will go get x-ray at initial location, she doesn't want to pay for updated films  RTC 6 weeks    X-RAY INTERPRETATION:   MRI of the Right Ankle: ordered and interpreted in the office today was positive for Impression:     1. Osteochondral lesions in the medial talar dome and tibial plafond,  as evidenced by focal chondral thinning/loss and subchondral cystic  change with associated small to moderate ankle joint effusion which  decompresses posteriorly into the pre-Achilles fat, communicates with  the flexor hallucis longus tendon sheath, and extends into the region  of the deep deltoid ligament.  2. Longitudinal split tear of the peroneus brevis with associated mild  peroneus longus tendinopathy.  3. Small plantar calcaneal enthesophyte with associated findings of  mild plantar fasciitis.  4. Minimal posterior tibial tenosynovitis.    Ivet Ross MD

## 2018-01-05 NOTE — MR AVS SNAPSHOT
After Visit Summary   1/5/2018    Valorie Hahn    MRN: [id number]           Patient Information     Date Of Birth          1955        Visit Information        Provider Department      1/5/2018 11:00 AM Ivet Ross MD [facility]        Today's Diagnoses     Chronic pain of right ankle    -  1       Follow-ups after your visit        Additional Services     PHYSICAL THERAPY REFERRAL (Internal)       Physical Therapy Referral                  Follow-up notes from your care team     Return in about 6 weeks (around 2/16/2018), or if symptoms worsen or fail to improve.      Your next 10 appointments already scheduled     Jan 17, 2018 10:10 AM CST   (Arrive by 9:55 AM)   H[place]RIET[place] Extremity with Carlota Hutton PT   [facility] [place] ([facility])    [address] [place] [id number] [phone]            Jan 24, 2018 10:50 AM CST   [place]ET[place] Extremity with Carlota Hutton PT   [facility] ([facility])    [address] [place] [address] [phone]            Jan 31, 2018 10:50 AM CST   H[place]RIET[place] Extremity with Carlota Hutton PT   [facility] [place] ([facility])    [address] [place] [address] [phone]              Who to contact     Please call your clinic at [phone] to:    Ask questions about your health    Make or cancel appointments    Discuss your medicines    Learn about your test results    Speak to your doctor   If you have compliments or concerns about an experience at your clinic, or if you wish to file a complaint, please contact [facility] Patient Relations at [phone] or email us at [email or web address]         Additional Information About Your Visit        MyChart Information     MyChart gives you  "secure access to your electronic health record. If you see a primary care provider, you can also send messages to your care team and make appointments. If you have questions, please call your primary care clinic.  If you do not have a primary care provider, please call [phone] and they will assist you.      [employer]pi is an electronic gateway that provides easy, online access to your medical records. With [employer], you can request a clinic appointment, read your test results, renew a prescription or communicate with your care team.     To access your existing account, please contact your [facility] or call [phone] for assistance.        Care EveryWhere ID     This is your Care EveryWhere ID. This could be used by other organizations to access your [place] medical records  [id number]        Your Vitals Were     Respirations Height Last Period BMI (Body Mass Index)          16 5' 3\" (1.6 m) 02/14/2010 33.13 kg/m2         Blood Pressure from Last 3 Encounters:   08/20/17 135/73   08/02/17 118/66   07/11/17 117/71    Weight from Last 3 Encounters:   01/05/18 187 lb (84.8 kg)   12/06/17 187 lb 3.2 oz (84.9 kg)   08/20/17 181 lb (82.1 kg)              We Performed the Following     PHYSICAL THERAPY REFERRAL (Internal)        Primary Care Provider Office Phone # Fax #    BC [facility] [phone] [address] [place] [address]        Equal Access to Services     CARY BRUSH : Hadii aad ku hadasho Soomaali, waaxda luqadaha, qaybta kaalmada adeegyada, adry vogelin norm bangura . So [facility] [phone].    ATENCIÓN: Si habla azaelañol, tiene a mas disposición servicios gratuitos de asistencia lingüística. Llame al [phone].    We comply with applicable federal civil rights laws and [place] laws. We do not discriminate on the basis of race, color, national origin, age, disability, sex, sexual orientation, or gender " identity.            Thank you!     Thank you for choosing [facility]  for your care. Our goal is always to provide you with excellent care. Hearing back from our patients is one way we can continue to improve our services. Please take a few minutes to complete the written survey that you may receive in the mail after your visit with us. Thank you!             Your Updated Medication List - Protect others around you: Learn how to safely use, store and throw away your medicines at www.disposemymeds.org.          This list is accurate as of: 1/5/18  3:17 PM.  Always use your most recent med list.                   Brand Name Dispense Instructions for use Diagnosis    * acyclovir 400 MG tablet    ZOVIRAX    60 tablet    Take 1 tablet (400 mg) by mouth 3 times daily    Herpes simplex vulvovaginitis       * acyclovir 400 MG tablet    ZOVIRAX    15 tablet    Take 1 tablet (400 mg) by mouth 3 times daily    Herpes simplex vulvovaginitis       albuterol 108 (90 BASE) MCG/ACT Inhaler    PROAIR HFA/PROVENTIL HFA/VENTOLIN HFA    1 Inhaler    Inhale 2 puffs into the lungs every 4 hours as needed for shortness of breath / dyspnea    Mild intermittent asthma without complication       cetirizine 5 MG Chew    zyrTEC     Take 5 mg by mouth daily        fluticasone 110 MCG/ACT Inhaler    FLOVENT HFA    1 Inhaler    Inhale 2 puffs into the lungs 2 times daily    Mild intermittent asthma without complication       guaiFENesin-codeine 100-10 MG/5ML Soln solution    ROBITUSSIN AC    120 mL    Take 5-10 mLs by mouth every 4 hours as needed for cough    Acute sinusitis with symptoms > 10 days       IBU PO      Take  by mouth.        meloxicam 7.5 MG tablet    MOBIC    90 tablet    Take 1 tablet (7.5 mg) by mouth daily    SI (sacroiliac) joint dysfunction       methylPREDNISolone 4 MG tablet    MEDROL DOSEPAK    21 tablet    Follow package instructions    Rash and nonspecific skin eruption       tamsulosin 0.4 MG capsule     FLOMAX    15 capsule    Take 1 capsule (0.4 mg) by mouth daily    LLQ abdominal pain       traMADol 50 MG tablet    ULTRAM    15 tablet    Take 1 tablet (50 mg) by mouth every 6 hours as needed for moderate pain    Left flank pain, LLQ abdominal pain       VITAMIN D3 PO      Take by mouth daily        * Notice:  This list has 2 medication(s) that are the same as other medications prescribed for you. Read the directions carefully, and ask your doctor or other care provider to review them with you.

## 2018-01-24 ENCOUNTER — THERAPY VISIT (OUTPATIENT)
Dept: PHYSICAL THERAPY | Facility: CLINIC | Age: 63
End: 2018-01-24
Payer: COMMERCIAL

## 2018-01-24 DIAGNOSIS — M25.571 PAIN IN JOINT INVOLVING ANKLE AND FOOT, RIGHT: Primary | ICD-10-CM

## 2018-01-24 PROCEDURE — 97530 THERAPEUTIC ACTIVITIES: CPT | Mod: GP | Performed by: PHYSICAL THERAPIST

## 2018-01-24 PROCEDURE — 97161 PT EVAL LOW COMPLEX 20 MIN: CPT | Mod: GP | Performed by: PHYSICAL THERAPIST

## 2018-01-24 PROCEDURE — G8979 MOBILITY GOAL STATUS: HCPCS | Mod: GP | Performed by: PHYSICAL THERAPIST

## 2018-01-24 PROCEDURE — 97110 THERAPEUTIC EXERCISES: CPT | Mod: GP | Performed by: PHYSICAL THERAPIST

## 2018-01-24 PROCEDURE — G8978 MOBILITY CURRENT STATUS: HCPCS | Mod: GP | Performed by: PHYSICAL THERAPIST

## 2018-01-24 NOTE — MR AVS SNAPSHOT
After Visit Summary   1/24/2018    Valorie Hahn    MRN: [id number]           Patient Information     Date Of Birth          1955        Visit Information        Provider Department      1/24/2018 10:50 AM Carlota Hutton, PT [facility] [place]        Today's Diagnoses     Pain in joint involving ankle and foot, right    -  1       Follow-ups after your visit        Your next 10 appointments already scheduled     Jan 31, 2018 10:50 AM CST   H[place]RIET[place] Extremity with Carlota Hutton PT   [facility] [place] ([facility])    [address] [place] [address]   [phone]            Feb 07, 2018  9:30 AM CST   H[place]RIET[place] Extremity with Carlota Hutton PT   [facility] [place] ([facility])    [address] [place] [address] [phone]            Feb 14, 2018 10:50 AM CST   HENRIET[place] Extremity with Carlota Hutton PT   [facility] ([facility])    [address] [place] [address] [phone]              Who to contact     If you have questions or need follow up information about today's clinic visit or your schedule please contact [facility] directly at [phone].  Normal or non-critical lab and imaging results will be communicated to you by MyChart, letter or phone within 4 business days after the clinic has received the results. If you do not hear from us within 7 days, please contact the clinic through MyChart or phone. If you have a critical or abnormal lab result, we will notify you by phone as soon as possible.  Submit refill requests through [employer] or call your pharmacy and they will forward the refill request to us. Please allow 3 business days for your refill to be completed.          Additional Information About Your  Visit        SnapTellhart Information     [employer] gives you secure access to your electronic health record. If you see a primary care provider, you can also send messages to your care team and make appointments. If you have questions, please call your primary care clinic.  If you do not have a primary care provider, please call [phone] and they will assist you.        Care EveryWhere ID     This is your Care EveryWhere ID. This could be used by other organizations to access your [place] medical records  [id number]        Your Vitals Were     Last Period                   02/14/2010            Blood Pressure from Last 3 Encounters:   08/20/17 135/73   08/02/17 118/66   07/11/17 117/71    Weight from Last 3 Encounters:   01/05/18 84.8 kg (187 lb)   12/06/17 84.9 kg (187 lb 3.2 oz)   08/20/17 82.1 kg (181 lb)              We Performed the Following     HC PT EVAL, LOW COMPLEXITY     HENRIET[place] INITIAL EVAL REPORT     THERAPEUTIC ACTIVITIES     THERAPEUTIC EXERCISES        Primary Care Provider Office Phone # Fax #    BC Benedict [phone] [address] [place] [address]        Equal Access to Services     CARY BRUSH AH: Hadii aad ku curtiso Sotrell, waaxda luqadaha, qaybta kaalmada adeegyada, adry sandoval. So [facility] [phone].    ATENCIÓN: Si habla español, tiene a mas disposición servicios gratuitos de asistencia lingüística. [facility] [phone].    We comply with applicable federal civil rights laws and [place] laws. We do not discriminate on the basis of race, color, national origin, age, disability, sex, sexual orientation, or gender identity.            Thank you!     Thank you for choosing [facility] [place]  for your care. Our goal is always to provide you with excellent care. Hearing back from our patients is one way we can continue to improve our services. Please take a few minutes to complete the  written survey that you may receive in the mail after your visit with us. Thank you!             Your Updated Medication List - Protect others around you: Learn how to safely use, store and throw away your medicines at www.disposemymeds.org.          This list is accurate as of 1/24/18 11:59 PM.  Always use your most recent med list.                   Brand Name Dispense Instructions for use Diagnosis    * acyclovir 400 MG tablet    ZOVIRAX    60 tablet    Take 1 tablet (400 mg) by mouth 3 times daily    Herpes simplex vulvovaginitis       * acyclovir 400 MG tablet    ZOVIRAX    15 tablet    Take 1 tablet (400 mg) by mouth 3 times daily    Herpes simplex vulvovaginitis       albuterol 108 (90 BASE) MCG/ACT Inhaler    PROAIR HFA/PROVENTIL HFA/VENTOLIN HFA    1 Inhaler    Inhale 2 puffs into the lungs every 4 hours as needed for shortness of breath / dyspnea    Mild intermittent asthma without complication       cetirizine 5 MG Chew    zyrTEC     Take 5 mg by mouth daily        fluticasone 110 MCG/ACT Inhaler    FLOVENT HFA    1 Inhaler    Inhale 2 puffs into the lungs 2 times daily    Mild intermittent asthma without complication       guaiFENesin-codeine 100-10 MG/5ML Soln solution    ROBITUSSIN AC    120 mL    Take 5-10 mLs by mouth every 4 hours as needed for cough    Acute sinusitis with symptoms > 10 days       IBU PO      Take  by mouth.        meloxicam 7.5 MG tablet    MOBIC    90 tablet    Take 1 tablet (7.5 mg) by mouth daily    SI (sacroiliac) joint dysfunction       methylPREDNISolone 4 MG tablet    MEDROL DOSEPAK    21 tablet    Follow package instructions    Rash and nonspecific skin eruption       tamsulosin 0.4 MG capsule    FLOMAX    15 capsule    Take 1 capsule (0.4 mg) by mouth daily    LLQ abdominal pain       traMADol 50 MG tablet    ULTRAM    15 tablet    Take 1 tablet (50 mg) by mouth every 6 hours as needed for moderate pain    Left flank pain, LLQ abdominal pain       VITAMIN D3 PO      Take  by mouth daily        * Notice:  This list has 2 medication(s) that are the same as other medications prescribed for you. Read the directions carefully, and ask your doctor or other care provider to review them with you.

## 2018-01-24 NOTE — PROGRESS NOTES
hospitals  System  Physical Exam  General    ROS      Physical Therapy Initial Examination/Evaluation January 24, 2018   Valorie Hahn is a 62 year old female referred to physical therapy by Dr. Ross for treatment of chronic R ankle pain with Precautions/Restrictions/MD instructions Strengthening, home/gym program instruction   Therapist Assessment:   Clinical Impression: Pt presents to [facility] with primary complaint of chronic R ankle pain.  Per clinical examination, ROM limited on R side. Unable to reproduce pt's pain with palpation; pt reports more pain symptoms at end of day.  Edema noted in calf just about Achilles tendon. Pt will benefit from skilled physical therapy to improve ROM and strength of ankle as well as core stabilization program.   Subjective: [place] 2 years ago. She hopped on a rock and felt her ankle twist. She was able to finish her hike but the next day was unable to bear weight. Pain has been intermittent now over the past 2 years. Pain is worse with weight bearing exercises. She has intermittent swelling, especially at end of day.   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 Previous treatment: injection in ankle right after incident   Imaging:  MRI   Chief Complaint: R ankle pain    Pain: rest 0 /10, activity 6/10  Described as: dull Alleviated by: ice, NSAIDS Exacerbated by: Driving, wt bearing activities, walking up-hill   Progression of symptoms since initial onset: worsening Time of day when pain is worse: activity related   Sleeping: no issues   Social history: Lives with partner who currently has rotator cuff injury   Occupation: Computer software Job duties: prolonged sitting   Current HEP/exercise regimen: likes to play tennis; unable to do wt bearing activities; water aerobics, biking   Patient's goals are hiking, tennis    Other pertinent PMH: Asthma,  menopausal General health as reported by patient: Good    Return to MD: prn      ANKLE EVALUATION    Static Posture  Foot: Pes planus/cavus slight planus  Knee: Varus/Valgus unremarkable        Dynamic Posture  Single leg stance: Left >30s .  Right 40s.  Double limb squat:  WNL  Single limb squat: Left limited motion .  Right: Lmited motion ; crepitus noted in knees bilat      Ankle Range of Motion  Ankle AROM Dorsiflexion Plantarflexion Inversion Eversion WBing DF (cm)   Left 10 40 26 20 10.5   Right 8 40 30 20 8.5   WBing DF- distance between wall and great toe where pt can touch knee to wall and keep heel in contact with floor    Ankle PROM Dorsiflexion Plantarflexion Inversion Eversion   Left 17 NT NT NT   Right 15 NT NT NT     Ankle Strength  Ankle MMT Dorsiflexion Plantarflexion Inversion Eversion   Left 4+/5 4-/5 4+/5 4/5   Right 4+/5 4/5 4+/5 4/5     Provocation tests  Unable to reproduce pain with movement or palpation     Assessment/Plan:  Patient is a 62 year old female with right side ankle complaints.    Patient has the following significant findings with corresponding treatment plan.                Diagnosis 1:  R ankle pain   Pain -  hot/cold therapy, manual therapy, self management, education and home program  Decreased ROM/flexibility - manual therapy, therapeutic exercise and home program  Decreased strength - therapeutic exercise, therapeutic activities and home program  Impaired muscle performance - neuro re-education and home program  Decreased function - therapeutic activities and home program    Therapy Evaluation Codes:   1) History comprised of:   Personal factors that impact the plan of care:      Living environment, Past/current experiences and Time since onset of symptoms.    Comorbidity factors that impact the plan of care are:      Asthma and Menopausal.     Medications impacting care: Anti-inflammatory.  2) Examination of Body Systems comprised of:   Body structures and functions that  impact the plan of care:      Ankle.   Activity limitations that impact the plan of care are:      Driving, Running, Sports, Squatting/kneeling, Stairs, Standing, Walking and Working.  3) Clinical presentation characteristics are:   Stable/Uncomplicated.  4) Decision-Making    Low complexity using standardized patient assessment instrument and/or measureable assessment of functional outcome.  Cumulative Therapy Evaluation is: Low complexity.    Previous and current functional limitations:  (See Goal Flow Sheet for this information)    Short term and Long term goals: (See Goal Flow Sheet for this information)     Communication ability:  Patient appears to be able to clearly communicate and understand verbal and written communication and follow directions correctly.  Treatment Explanation - The following has been discussed with the patient:   RX ordered/plan of care  Anticipated outcomes  Possible risks and side effects  This patient would benefit from PT intervention to resume normal activities.   Rehab potential is good.    Frequency:  1 X week, once daily  Duration:  for 8 weeks  Discharge Plan:  Achieve all LTG.  Independent in home treatment program.  Reach maximal therapeutic benefit.    Please refer to the daily flowsheet for treatment today, total treatment time and time spent performing 1:1 timed codes.

## 2018-01-25 PROBLEM — M25.571 PAIN IN JOINT INVOLVING ANKLE AND FOOT, RIGHT: Status: ACTIVE | Noted: 2018-01-25

## 2018-02-07 ENCOUNTER — THERAPY VISIT (OUTPATIENT)
Dept: PHYSICAL THERAPY | Facility: CLINIC | Age: 63
End: 2018-02-07
Payer: COMMERCIAL

## 2018-02-07 DIAGNOSIS — M25.571 PAIN IN JOINT INVOLVING ANKLE AND FOOT, RIGHT: ICD-10-CM

## 2018-02-07 PROCEDURE — 97110 THERAPEUTIC EXERCISES: CPT | Mod: GP | Performed by: PHYSICAL THERAPIST

## 2018-02-07 PROCEDURE — G8979 MOBILITY GOAL STATUS: HCPCS | Mod: GP | Performed by: PHYSICAL THERAPIST

## 2018-02-07 PROCEDURE — 97112 NEUROMUSCULAR REEDUCATION: CPT | Mod: GP | Performed by: PHYSICAL THERAPIST

## 2018-02-07 PROCEDURE — 97140 MANUAL THERAPY 1/> REGIONS: CPT | Mod: GP | Performed by: PHYSICAL THERAPIST

## 2018-02-07 PROCEDURE — G8978 MOBILITY CURRENT STATUS: HCPCS | Mod: GP | Performed by: PHYSICAL THERAPIST

## 2018-02-16 ENCOUNTER — THERAPY VISIT (OUTPATIENT)
Dept: PHYSICAL THERAPY | Facility: CLINIC | Age: 63
End: 2018-02-16
Payer: COMMERCIAL

## 2018-02-16 DIAGNOSIS — M25.571 PAIN IN JOINT INVOLVING ANKLE AND FOOT, RIGHT: ICD-10-CM

## 2018-02-16 PROCEDURE — 97112 NEUROMUSCULAR REEDUCATION: CPT | Mod: GP | Performed by: PHYSICAL THERAPIST

## 2018-02-16 PROCEDURE — G8978 MOBILITY CURRENT STATUS: HCPCS | Mod: GP | Performed by: PHYSICAL THERAPIST

## 2018-02-16 PROCEDURE — 97110 THERAPEUTIC EXERCISES: CPT | Mod: GP | Performed by: PHYSICAL THERAPIST

## 2018-02-16 PROCEDURE — G8979 MOBILITY GOAL STATUS: HCPCS | Mod: GP | Performed by: PHYSICAL THERAPIST

## 2018-04-17 ENCOUNTER — TELEPHONE (OUTPATIENT)
Dept: FAMILY MEDICINE | Facility: CLINIC | Age: 63
End: 2018-04-17

## 2018-04-17 ENCOUNTER — OFFICE VISIT (OUTPATIENT)
Dept: FAMILY MEDICINE | Facility: CLINIC | Age: 63
End: 2018-04-17
Payer: COMMERCIAL

## 2018-04-17 VITALS
SYSTOLIC BLOOD PRESSURE: 104 MMHG | TEMPERATURE: 98 F | WEIGHT: 186 LBS | HEART RATE: 72 BPM | DIASTOLIC BLOOD PRESSURE: 68 MMHG | RESPIRATION RATE: 20 BRPM | BODY MASS INDEX: 32.96 KG/M2 | HEIGHT: 63 IN | OXYGEN SATURATION: 98 %

## 2018-04-17 DIAGNOSIS — A60.04 HERPES SIMPLEX VULVOVAGINITIS: ICD-10-CM

## 2018-04-17 DIAGNOSIS — Z00.00 ROUTINE GENERAL MEDICAL EXAMINATION AT A HEALTH CARE FACILITY: Primary | ICD-10-CM

## 2018-04-17 DIAGNOSIS — J45.40 MODERATE PERSISTENT ASTHMA WITHOUT COMPLICATION: ICD-10-CM

## 2018-04-17 PROCEDURE — 99396 PREV VISIT EST AGE 40-64: CPT | Performed by: NURSE PRACTITIONER

## 2018-04-17 RX ORDER — ACYCLOVIR 400 MG/1
400 TABLET ORAL 2 TIMES DAILY
Qty: 30 TABLET | Refills: 3 | Status: SHIPPED | OUTPATIENT
Start: 2018-04-17 | End: 2019-03-27

## 2018-04-17 RX ORDER — FLUTICASONE PROPIONATE 110 UG/1
2 AEROSOL, METERED RESPIRATORY (INHALATION) 2 TIMES DAILY
Qty: 1 INHALER | Refills: 0 | Status: CANCELLED | OUTPATIENT
Start: 2018-04-17

## 2018-04-17 NOTE — MR AVS SNAPSHOT
After Visit Summary   4/17/2018    Valorie Hahn    MRN: [id number]           Patient Information     Date Of Birth          1955        Visit Information        Provider Department      4/17/2018 1:20 PM Lisbteh Ruiz APRN [facility]        Today's Diagnoses     Routine general medical examination at a health care facility    -  1    Moderate persistent asthma without complication        Herpes simplex vulvovaginitis          Care Instructions      Preventive Health Recommendations  Female Ages 50 - 64    Yearly exam: See your health care provider every year in order to  o Review health changes.   o Discuss preventive care.    o Review your medicines if your doctor has prescribed any.      Get a Pap test every three years (unless you have an abnormal result and your provider advises testing more often).    If you get Pap tests with HPV test, you only need to test every 5 years, unless you have an abnormal result.     You do not need a Pap test if your uterus was removed (hysterectomy) and you have not had cancer.    You should be tested each year for STDs (sexually transmitted diseases) if you're at risk.     Have a mammogram every 1 to 2 years.    Have a colonoscopy at age 50, or have a yearly FIT test (stool test). These exams screen for colon cancer.      Have a cholesterol test every 5 years, or more often if advised.    Have a diabetes test (fasting glucose) every three years. If you are at risk for diabetes, you should have this test more often.     If you are at risk for osteoporosis (brittle bone disease), think about having a bone density scan (DEXA).    Shots: Get a flu shot each year. Get a tetanus shot every 10 years.    Nutrition:     Eat at least 5 servings of fruits and vegetables each day.    Eat whole-grain bread, whole-wheat pasta and brown rice instead of white grains and rice.    Talk to your provider about Calcium and Vitamin D.      Lifestyle    Exercise at least 150 minutes a week (30 minutes a day, 5 days a week). This will help you control your weight and prevent disease.    Limit alcohol to one drink per day.    No smoking.     Wear sunscreen to prevent skin cancer.     See your dentist every six months for an exam and cleaning.    See your eye doctor every 1 to 2 years.            Follow-ups after your visit        Additional Services     PULMONARY MEDICINE REFERRAL       Your provider has referred you to: [facility]: [facility] [place] [phone]   [email or web address]  [facility] [place] [phone]   [email or web address]  [facility]: [facility] [phone]   [email or web address]  [place] [phone]   [email or web address]  [place] [phone]   [email or web address]    Please be aware that coverage of these services is subject to the terms and limitations of your health insurance plan.  Call member services at your health plan with any benefit or coverage questions.      Please bring the following with you to your appointment:    (1) Any X-Rays, CTs or MRIs which have been performed.  Contact the facility where they were done to arrange for  prior to your scheduled appointment.    (2) List of current medications   (3) This referral request   (4) Any documents/labs given to you for this referral                  Your next 10 appointments already scheduled     Apr 18, 2018 11:30 AM CDT   (Arrive by 11:15 AM)   MA SCREENING DIGITAL BILATERAL with [email or web address]   [facility] ([facility])    [address]  2nd Floor  [facility] [address] [phone]           Do not use any powder, lotion or deodorant under your arms or on your breast. If you do, we will ask you to remove it before your exam.   "Wear comfortable, two-piece clothing.  If you have any allergies, tell your care team.  Bring any previous mammograms from other facilities or have them mailed to the breast center. Three-dimensional (3D) mammograms are available at [place] locations in [facility], [facility], [facility], and [place]. [facility] locations include [place] and [facility] in [place]. Benefits of 3D mammograms include: - Improved rate of cancer detection - Decreases your chance of having to go back for more tests, which means fewer: - \"False-positive\" results (This means that there is an abnormal area but it isn't cancer.) - Invasive testing procedures, such as a biopsy or surgery - Can provide clearer images of the breast if you have dense breast tissue. 3D mammography is an optional exam that anyone can have with a 2D mammogram. It doesn't replace or take the place of a 2D mammogram. 2D mammograms remain an effective screening test for all women.  Not all insurance companies cover the cost of a 3D mammogram. Check with your insurance.              Who to contact     If you have questions or need follow up information about today's clinic visit or your schedule please contact [facility] directly at [phone].  Normal or non-critical lab and imaging results will be communicated to you by [employer], letter or phone within 4 business days after the clinic has received the results. If you do not hear from us within 7 days, please contact the clinic through En[employer] or phone. If you have a critical or abnormal lab result, we will notify you by phone as soon as possible.  Submit refill requests through [employer] or call your pharmacy and they will forward the refill request to us. Please allow 3 business days for your refill to be completed.          Additional Information About Your Visit        [employer] Information     [employer] gives you secure access to your " "electronic health record. If you see a primary care provider, you can also send messages to your care team and make appointments. If you have questions, please call your primary care clinic.  If you do not have a primary care provider, please call [phone] and they will assist you.        Care EveryWhere ID     This is your Care EveryWhere ID. This could be used by other organizations to access your [facility] medical records  [id number]        Your Vitals Were     Pulse Temperature Respirations Height Last Period Pulse Oximetry    72 98  F (36.7  C) (Oral) 20 5' 3\" (1.6 m) 02/14/2010 98%    Breastfeeding? BMI (Body Mass Index)                No 32.95 kg/m2           Blood Pressure from Last 3 Encounters:   04/17/18 104/68   08/20/17 135/73   08/02/17 118/66    Weight from Last 3 Encounters:   04/17/18 186 lb (84.4 kg)   01/05/18 187 lb (84.8 kg)   12/06/17 187 lb 3.2 oz (84.9 kg)              We Performed the Following     PULMONARY MEDICINE REFERRAL          Today's Medication Changes          These changes are accurate as of 4/17/18  1:57 PM.  If you have any questions, ask your nurse or doctor.               Start taking these medicines.        Dose/Directions    fluticasone-salmeterol 250-50 MCG/DOSE diskus inhaler   Commonly known as:  ADVAIR   Used for:  Moderate persistent asthma without complication   Started by:  Lisbeth Ruiz APRN CNP        Dose:  1 puff   Inhale 1 puff into the lungs 2 times daily   Quantity:  3 Inhaler   Refills:  3         These medicines have changed or have updated prescriptions.        Dose/Directions    acyclovir 400 MG tablet   Commonly known as:  ZOVIRAX   This may have changed:    - when to take this  - additional instructions  - Another medication with the same name was removed. Continue taking this medication, and follow the directions you see here.   Used for:  Herpes simplex vulvovaginitis   Changed by:  Lisbeth Ruiz APRN CNP        Dose:  400 mg "   Take 1 tablet (400 mg) by mouth 2 times daily For 3 days with each outbreak.   Quantity:  30 tablet   Refills:  3         Stop taking these medicines if you haven't already. Please contact your care team if you have questions.     fluticasone 110 MCG/ACT Inhaler   Commonly known as:  FLOVENT HFA   Stopped by:  Lisbeth Ruiz APRN CNP                Where to get your medicines      These medications were sent to [facility] [address] [place] [address] AT [address] [address]     Phone:  [phone]     acyclovir 400 MG tablet    fluticasone-salmeterol 250-50 MCG/DOSE diskus inhaler                Primary Care Provider Office Phone # Fax #    BC Benedict [phone] [address] [place] [address]        Equal Access to Services     CARY BRUSH : Hadii aad ku hadasho Soomaali, waaxda luqadaha, qaybta kaalmada adeegyada, waxay idiin hayaan davideg jose bangura . So [facility] [phone].    ATENCIÓN: Si habla español, tiene a mas disposición servicios gratuitos de asistencia lingüística. [facility] [phone].    We comply with applicable federal civil rights laws and [place] laws. We do not discriminate on the basis of race, color, national origin, age, disability, sex, sexual orientation, or gender identity.            Thank you!     Thank you for choosing [facility]  for your care. Our goal is always to provide you with excellent care. Hearing back from our patients is one way we can continue to improve our services. Please take a few minutes to complete the written survey that you may receive in the mail after your visit with us. Thank you!             Your Updated Medication List - Protect others around you: Learn how to safely use, store and throw away your medicines at www.disposemymeds.org.          This list is accurate as of 4/17/18  1:57 PM.   Always use your most recent med list.                   Brand Name Dispense Instructions for use Diagnosis    acyclovir 400 MG tablet    ZOVIRAX    30 tablet    Take 1 tablet (400 mg) by mouth 2 times daily For 3 days with each outbreak.    Herpes simplex vulvovaginitis       albuterol 108 (90 Base) MCG/ACT Inhaler    PROAIR HFA/PROVENTIL HFA/VENTOLIN HFA    1 Inhaler    Inhale 2 puffs into the lungs every 4 hours as needed for shortness of breath / dyspnea    Mild intermittent asthma without complication       cetirizine 5 MG Chew    zyrTEC     Take 5 mg by mouth daily        fluticasone-salmeterol 250-50 MCG/DOSE diskus inhaler    ADVAIR    3 Inhaler    Inhale 1 puff into the lungs 2 times daily    Moderate persistent asthma without complication       IBU PO      Take  by mouth.        meloxicam 7.5 MG tablet    MOBIC    90 tablet    Take 1 tablet (7.5 mg) by mouth daily    SI (sacroiliac) joint dysfunction       VITAMIN D3 PO      Take by mouth daily

## 2018-04-17 NOTE — LETTER
May 17, 2018          Valorie Hahn  [address]  [facility] [address]          Dear ,    I hope you are doing well. Your provider wanted me to check in with you to see how you are doing with your asthma. Please fill out the attached questionnaire(s) and send it back to us within 3-4 days so we can assess the status of your health. Or if you prefer, we can go over the questionnaire with you on the phone. We will call you within one week to complete the questionnaire.      Sincerely,    Your [facility] Care Team

## 2018-04-17 NOTE — NURSING NOTE
"Chief Complaint   Patient presents with     Physical       Initial /68  Pulse 72  Temp 98  F (36.7  C) (Oral)  Resp 20  Ht 5' 3\" (1.6 m)  Wt 186 lb (84.4 kg)  LMP 02/14/2010  SpO2 98%  Breastfeeding? No  BMI 32.95 kg/m2 Estimated body mass index is 32.95 kg/(m^2) as calculated from the following:    Height as of this encounter: 5' 3\" (1.6 m).    Weight as of this encounter: 186 lb (84.4 kg).  Medication Reconciliation: complete       Yuri Rodriguez MA       "

## 2018-04-17 NOTE — PROGRESS NOTES
SUBJECTIVE:   CC: Valorie Hahn is an 62 year old woman who presents for preventive health visit.     Physical   Annual:     Getting at least 3 servings of Calcium per day::  Yes    Bi-annual eye exam::  Yes    Dental care twice a year::  Yes    Sleep apnea or symptoms of sleep apnea::  None    Diet::  Regular (no restrictions)    Frequency of exercise::  2-3 days/week    Duration of exercise::  30-45 minutes    Taking medications regularly::  Yes    Medication side effects::  Not applicable    Additional concerns today::  YES              Today's PHQ-2 Score:   PHQ-2 ( 1999 [employer]) 4/17/2018   Q1: Little interest or pleasure in doing things 0   Q2: Feeling down, depressed or hopeless 0   PHQ-2 Score 0   Q1: Little interest or pleasure in doing things Not at all   Q2: Feeling down, depressed or hopeless Not at all   PHQ-2 Score 0       Abuse: Current or Past(Physical, Sexual or Emotional)- No  Do you feel safe in your environment - Yes    Social History   Substance Use Topics     Smoking status: Former Smoker     Packs/day: 0.50     Years: 10.00     Types: Cigarettes     Start date: 1/1/1990     Quit date: 5/25/2000     Smokeless tobacco: Never Used     Alcohol use Yes      Comment: 1-2 glasses of wine per night     Alcohol Use 4/17/2018   If you drink alcohol do you typically have greater than 3 drinks per day OR greater than 7 drinks per week? No   No flowsheet data found.    Reviewed orders with patient.  Reviewed health maintenance and updated orders accordingly - Yes  Labs reviewed in EPIC  BP Readings from Last 3 Encounters:   04/17/18 104/68   08/20/17 135/73   08/02/17 118/66    Wt Readings from Last 3 Encounters:   04/17/18 186 lb (84.4 kg)   01/05/18 187 lb (84.8 kg)   12/06/17 187 lb 3.2 oz (84.9 kg)                  Patient Active Problem List   Diagnosis     Herpes simplex vulvovaginitis     Mild intermittent asthma without complication     CARDIOVASCULAR SCREENING; LDL GOAL LESS THAN 160      Family history of thyroid disease     Advanced directives, counseling/discussion     SI (sacroiliac) joint dysfunction     Osteitis, condensans     Sacroiliac joint disease     SI (sacroiliac) pain     Colon polyps     Chronic low back pain     Left hip pain     Dyspnea     Pain in joint involving ankle and foot, right     Past Surgical History:   Procedure Laterality Date     C APPENDECTOMY       C NONSPECIFIC PROCEDURE      Anal fissure repair     COLONOSCOPY      some polyps removed     HC TOOTH EXTRACTION W/FORCEP         Social History   Substance Use Topics     Smoking status: Former Smoker     Packs/day: 0.50     Years: 10.00     Types: Cigarettes     Start date: 1990     Quit date: 2000     Smokeless tobacco: Never Used     Alcohol use Yes      Comment: 1-2 glasses of wine per night     Family History   Problem Relation Age of Onset     Respiratory Mother      emphysema     Mental Illness Mother      Thyroid Disease Mother      Thyroid Disease Mother      hypothyroid, takes supplement     CEREBROVASCULAR DISEASE Mother      Depression Mother      MENTAL ILLNESS Mother      Mental Illness Maternal Grandmother      Depression Maternal Grandmother      DIABETES Father      type 2     HEART DISEASE Father      bypass surgeries x 2     Other Cancer Father      non hodgkins     Thyroid Disease Sister      hypothyroid     Thyroid Disease Sister      hypothyroid     Psychotic Disorder Sister      commited suicide, depression     CANCER Brother      esophogeal cancer, work exposure to chemicals     Blood Disease Brother       hiv  aids     Blood Disease Brother      hep c     Blood Disease Brother      hiv positive, passed away     Family History Negative Brother      DIABETES Brother      Mental Illness Sister      Thyroid Disease Sister      Thyroid Disease Sister      DIABETES Brother      type 2     Depression Sister      Depression Sister      Depression Brother          Current Outpatient  Prescriptions   Medication Sig Dispense Refill     acyclovir (ZOVIRAX) 400 MG tablet Take 1 tablet (400 mg) by mouth 2 times daily For 3 days with each outbreak. 30 tablet 3     albuterol (PROAIR HFA/PROVENTIL HFA/VENTOLIN HFA) 108 (90 BASE) MCG/ACT Inhaler Inhale 2 puffs into the lungs every 4 hours as needed for shortness of breath / dyspnea 1 Inhaler 11     cetirizine (ZYRTEC) 5 MG CHEW Take 5 mg by mouth daily       Ibuprofen (IBU PO) Take  by mouth.       budesonide-formoterol (SYMBICORT) 160-4.5 MCG/ACT Inhaler Inhale 2 puffs into the lungs 2 times daily 3 Inhaler 1     Cholecalciferol (VITAMIN D3 PO) Take by mouth daily       meloxicam (MOBIC) 7.5 MG tablet Take 1 tablet (7.5 mg) by mouth daily (Patient not taking: Reported on 4/17/2018) 90 tablet 3     Allergies   Allergen Reactions     Penicillin G      Recent Labs   Lab Test  08/02/17   1502  08/10/16   1106  06/16/15   1010   05/14/13   1217   LDL   --   123*  96   --   96   HDL   --   69  67   --   68   TRIG   --   114  125   --   95   ALT  21   --    --    --    --    CR  0.60   --   0.67   --    --    GFRESTIMATED  >90   --   >90  Non  GFR Calc     --    --    GFRESTBLACK  >90   --   >90   GFR Calc     --    --    POTASSIUM  3.9   --   4.2   --    --    TSH   --   3.08  2.68   < >   --     < > = values in this interval not displayed.        Patient over age 50, mutual decision to screen reflected in health maintenance.    Pertinent mammograms are reviewed under the imaging tab.  History of abnormal Pap smear:   Last 3 Pap and HPV Results:   PAP / HPV Latest Ref Rng & Units 8/10/2016 5/31/2011 6/3/2008   PAP - OTHER-NIL, See Result NIL OTHER-NIL EM>40   HPV 16 DNA NEG Negative - -   HPV 18 DNA NEG Negative - -   OTHER HR HPV NEG Negative - -       Reviewed and updated as needed this visit by clinical staff  Tobacco  Allergies  Meds  Med Hx  Surg Hx  Fam Hx  Soc Hx        Reviewed and updated as needed this visit by  "Provider        Asthma:  Currently a little worse.  She is not using an inhaled cortiocsteroid due to out of pocket costs.  She has treated her home for allergens (taking out carpet, regularly cleaning, washing sheets, etc).     Review of Systems  C: NEGATIVE for fever, chills, change in weight  I: NEGATIVE for worrisome rashes, moles or lesions  E: NEGATIVE for vision changes or irritation  ENT: NEGATIVE for ear, mouth and throat problems  R: NEGATIVE for significant cough or SOB  B: NEGATIVE for masses, tenderness or discharge  CV: NEGATIVE for chest pain, palpitations or peripheral edema  GI: NEGATIVE for nausea, abdominal pain, heartburn, or change in bowel habits  : NEGATIVE for unusual urinary or vaginal symptoms. No vaginal bleeding.  M: NEGATIVE for significant arthralgias or myalgia  N: NEGATIVE for weakness, dizziness or paresthesias  P: NEGATIVE for changes in mood or affect      OBJECTIVE:   /68  Pulse 72  Temp 98  F (36.7  C) (Oral)  Resp 20  Ht 5' 3\" (1.6 m)  Wt 186 lb (84.4 kg)  LMP 02/14/2010  SpO2 98%  Breastfeeding? No  BMI 32.95 kg/m2  Physical Exam  GENERAL APPEARANCE: healthy, alert and no distress  EYES: Eyes grossly normal to inspection, PERRL and conjunctivae and sclerae normal  HENT: ear canals and TM's normal, nose and mouth without ulcers or lesions, oropharynx clear and oral mucous membranes moist  NECK: no adenopathy, no asymmetry, masses, or scars and thyroid normal to palpation  RESP: lungs clear to auscultation - no rales, rhonchi or wheezes  BREAST: normal without masses, tenderness or nipple discharge and no palpable axillary masses or adenopathy  CV: regular rate and rhythm, normal S1 S2, no S3 or S4, no murmur, click or rub, no peripheral edema and peripheral pulses strong  ABDOMEN: soft, nontender, no hepatosplenomegaly, no masses and bowel sounds normal   (female): normal female external genitalia, normal urethral meatus, vaginal mucosal atrophy noted, normal " "cervix, adnexae, and uterus without masses or abnormal discharge  MS: no musculoskeletal defects are noted and gait is age appropriate without ataxia  SKIN: no suspicious lesions or rashes  NEURO: Normal strength and tone, sensory exam grossly normal, mentation intact and speech normal  PSYCH: mentation appears normal and affect normal/bright    ASSESSMENT/PLAN:     (Z00.00) Routine general medical examination at a health care facility  (primary encounter diagnosis)  Comment:   Plan:     (J45.40) Moderate persistent asthma without complication  Comment:   Plan: PULMONARY MEDICINE REFERRAL, DISCONTINUED:         fluticasone-salmeterol (ADVAIR) 250-50 MCG/DOSE        diskus inhaler        I discussed with patient asthma disease process, maintenance inhaler use, bronchodialator use, and prevention of airway remodeling/disease progression. Asthma care package is in place. The patient needs to return to the clinic in 6 months for follow up. Anticipate a possible telephone call or mychart message by the triage nurse to recheck your asthma control test. Return to the clinic as needed for worsening or exacerbation of symptoms.      (A60.04) Herpes simplex vulvovaginitis  Comment: stable  Plan: acyclovir (ZOVIRAX) 400 MG tablet        Refills given.         COUNSELING:  Reviewed preventive health counseling, as reflected in patient instructions         reports that she quit smoking about 17 years ago. Her smoking use included Cigarettes. She started smoking about 28 years ago. She has a 5.00 pack-year smoking history. She has never used smokeless tobacco.    Estimated body mass index is 32.95 kg/(m^2) as calculated from the following:    Height as of this encounter: 5' 3\" (1.6 m).    Weight as of this encounter: 186 lb (84.4 kg).   Weight management plan: Discussed healthy diet and exercise guidelines and patient will follow up in 12 months in clinic to re-evaluate.    Counseling Resources:  ATP IV Guidelines  Pooled Cohorts " Equation Calculator  Breast Cancer Risk Calculator  FRAX Risk Assessment  ICSI Preventive Guidelines  Dietary Guidelines for Americans, 2010  USDA's MyPlate  ASA Prophylaxis  Lung CA Screening    BC Cuevas CNP  [facility]  Answers for HPI/ROS submitted by the patient on 4/17/2018   PHQ-2 Score: 0

## 2018-04-18 ENCOUNTER — RADIANT APPOINTMENT (OUTPATIENT)
Dept: MAMMOGRAPHY | Facility: CLINIC | Age: 63
End: 2018-04-18
Attending: NURSE PRACTITIONER
Payer: COMMERCIAL

## 2018-04-18 ENCOUNTER — TELEPHONE (OUTPATIENT)
Dept: FAMILY MEDICINE | Facility: CLINIC | Age: 63
End: 2018-04-18

## 2018-04-18 DIAGNOSIS — Z12.31 VISIT FOR SCREENING MAMMOGRAM: ICD-10-CM

## 2018-04-18 DIAGNOSIS — J45.20 MILD INTERMITTENT ASTHMA WITHOUT COMPLICATION: Primary | ICD-10-CM

## 2018-04-18 RX ORDER — BUDESONIDE AND FORMOTEROL FUMARATE DIHYDRATE 160; 4.5 UG/1; UG/1
2 AEROSOL RESPIRATORY (INHALATION) 2 TIMES DAILY
Qty: 3 INHALER | Refills: 1 | Status: SHIPPED | OUTPATIENT
Start: 2018-04-18 | End: 2019-05-07

## 2018-04-18 ASSESSMENT — ASTHMA QUESTIONNAIRES: ACT_TOTALSCORE: 14

## 2018-04-18 NOTE — TELEPHONE ENCOUNTER
Drug not covered by pt plan.  The preferred alternative is Symbicort.  Please call/fax pharmacy with appropriate info to change med.  Thanks Marjorie.

## 2018-05-21 ENCOUNTER — OFFICE VISIT (OUTPATIENT)
Dept: DERMATOLOGY | Facility: CLINIC | Age: 63
End: 2018-05-21
Payer: COMMERCIAL

## 2018-05-21 DIAGNOSIS — L57.0 AK (ACTINIC KERATOSIS): Primary | ICD-10-CM

## 2018-05-21 ASSESSMENT — PAIN SCALES - GENERAL: PAINLEVEL: NO PAIN (0)

## 2018-05-21 NOTE — PATIENT INSTRUCTIONS
Cryotherapy    What is it?    Use of a very cold liquid, such as liquid nitrogen, to freeze and destroy abnormal skin cells that need to be removed    What should I expect?    Tenderness and redness    A small blister that might grow and fill with dark purple blood. There may be crusting.    More than one treatment may be needed if the lesions do not go away.    How do I care for the treated area?    Gently wash the area with your hands when bathing.    Use a thin layer of Vaseline to help with healing. You may use a Band-Aid.     The area should heal within 7-10 days and may leave behind a pink or lighter color.     Do not use an antibiotic or Neosporin ointment.     You may take acetaminophen (Tylenol) for pain.     Call your Doctor if you have:    Severe pain    Signs of infection (warmth, redness, cloudy yellow drainage, and or a bad smell)    Questions or concerns    Who should I call with questions?       [facility]: [phone]       [facility]: [phone]       For urgent needs outside of business hours call the [facility] at [phone]        and ask for the dermatology resident on call

## 2018-05-21 NOTE — PROGRESS NOTES
[facility] Dermatology Note      Dermatology Problem List:  1. Actinic keratosis, nasal bridge: Cryo 5/21/18    Encounter Date: May 21, 2018    CC:   Chief Complaint   Patient presents with     Derm Problem     Valorie is here regarding a spot on her nose. She states that it has been there for months.          History of Present Illness:  Ms. Valorie Hahn is a 62 year old female who presents for evaluation of a lesion on her nose. The patient first noticed this 3-4 months ago as a rough, scaly spot on her nose. She has repeatedly picked off the scale, but it seems to grow back. It has not been painful and does not bleed spontaneously. The patient is otherwise feeling well with no additional skin concerns at this time.    Past Medical History:   Patient Active Problem List   Diagnosis     Herpes simplex vulvovaginitis     Mild intermittent asthma without complication     CARDIOVASCULAR SCREENING; LDL GOAL LESS THAN 160     Family history of thyroid disease     Advanced directives, counseling/discussion     SI (sacroiliac) joint dysfunction     Osteitis, condensans     Sacroiliac joint disease     SI (sacroiliac) pain     Colon polyps     Chronic low back pain     Left hip pain     Dyspnea     Pain in joint involving ankle and foot, right     Past Medical History:   Diagnosis Date     Arthritis 2012    SI joint, osteoarthritis     Esophageal reflux      Genital herpes, unspecified      Mild intermittent asthma with exacerbation      Past Surgical History:   Procedure Laterality Date     C APPENDECTOMY       C NONSPECIFIC PROCEDURE      Anal fissure repair     COLONOSCOPY      some polyps removed     HC TOOTH EXTRACTION W/FORCEP         Social History:  The patient works in [employer]. 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    Family History:  No family history of melanoma    Medications:  Current Outpatient Prescriptions   Medication Sig Dispense  Refill     acyclovir (ZOVIRAX) 400 MG tablet Take 1 tablet (400 mg) by mouth 2 times daily For 3 days with each outbreak. 30 tablet 3     albuterol (PROAIR HFA/PROVENTIL HFA/VENTOLIN HFA) 108 (90 BASE) MCG/ACT Inhaler Inhale 2 puffs into the lungs every 4 hours as needed for shortness of breath / dyspnea 1 Inhaler 11     budesonide-formoterol (SYMBICORT) 160-4.5 MCG/ACT Inhaler Inhale 2 puffs into the lungs 2 times daily 3 Inhaler 1     cetirizine (ZYRTEC) 5 MG CHEW Take 5 mg by mouth daily       Cholecalciferol (VITAMIN D3 PO) Take by mouth daily       Ibuprofen (IBU PO) Take  by mouth.       meloxicam (MOBIC) 7.5 MG tablet Take 1 tablet (7.5 mg) by mouth daily (Patient not taking: Reported on 4/17/2018) 90 tablet 3        Allergies   Allergen Reactions     Penicillin G          Review of Systems:  -As per HPI  -Constitutional: The patient denies fatigue, fevers, chills, unintended weight loss, and night sweats.  -HEENT: Patient denies nonhealing oral sores.  -Skin: As above in HPI. No additional skin concerns.    Physical exam:  Vitals: LMP 02/14/2010  GEN: This is a well developed, well-nourished female in no acute distress, in a pleasant mood.    SKIN: Focused examination of the face, neck, upper chest, and bilateral upper extremities was performed.  - Dorsal nasal bridge with pink, gritty papule with excoriated surface. No arborizing telangiectasias or other features on dermoscopy  - No other lesions of concern on areas examined.     Impression/Plan:  1. Actinic keratosis: Premalignant nature reviewed. Patient offered cryotherapy and accepted. Sun protection recommendations discussed.    Cryotherapy procedure note: After verbal consent and discussion of risks and benefits including but no limited to dyspigmentation/scar, blister, and pain, 1 actinic keratosis was(were) treated with 1-2mm freeze border for 2 cycles with liquid nitrogen. Post cryotherapy instructions were provided.           Follow-up in 2-3  years for skin check, sooner PRN      Dr. Ryan staffed the patient. Staff Physician Comments:  I saw and evaluated the patient with the resident and I agree with the assessment and plan. I was present for the key portions of the above major procedure and examination.    Andrew Ryan MD  Professor  Head of Dermato-Allergy Division  Department of Dermatology  [facility] [place]      Staff Involved:  Resident(Karson Espinoza)/Staff(as above)

## 2018-05-21 NOTE — MR AVS SNAPSHOT
After Visit Summary   5/21/2018    Valorie Hahn    MRN: [id number]           Patient Information     Date Of Birth          1955        Visit Information        Provider Department      5/21/2018 4:30 PM Andrew Ryan MD [facility] Dermatology        Care Instructions    Cryotherapy    What is it?    Use of a very cold liquid, such as liquid nitrogen, to freeze and destroy abnormal skin cells that need to be removed    What should I expect?    Tenderness and redness    A small blister that might grow and fill with dark purple blood. There may be crusting.    More than one treatment may be needed if the lesions do not go away.    How do I care for the treated area?    Gently wash the area with your hands when bathing.    Use a thin layer of Vaseline to help with healing. You may use a Band-Aid.     The area should heal within 7-10 days and may leave behind a pink or lighter color.     Do not use an antibiotic or Neosporin ointment.     You may take acetaminophen (Tylenol) for pain.     Call your Doctor if you have:    Severe pain    Signs of infection (warmth, redness, cloudy yellow drainage, and or a bad smell)    Questions or concerns    Who should I call with questions?       [facility]: [phone]       [facility]: [phone]       For urgent needs outside of business hours call the [facility] at [phone]        and ask for the dermatology resident on call           Follow-ups after your visit        Follow-up notes from your care team     Return in about 2 years (around 5/21/2020) for Routine Visit.      Who to contact     Please call your clinic at [phone] to:    Ask questions about your health    Make or cancel appointments    Discuss your medicines    Learn about your test results    Speak to your doctor            Additional Information About Your Visit        MyChart Information     MyChart  gives you secure access to your electronic health record. If you see a primary care provider, you can also send messages to your care team and make appointments. If you have questions, please call your primary care clinic.  If you do not have a primary care provider, please call [phone] and they will assist you.      [employer] is an electronic gateway that provides easy, online access to your medical records. With [employer], you can request a clinic appointment, read your test results, renew a prescription or communicate with your care team.     To access your existing account, please contact your [facility] or call [phone] for assistance.        Care EveryWhere ID     This is your Care EveryWhere ID. This could be used by other organizations to access your [place] medical records  [id number]        Your Vitals Were     Last Period                   02/14/2010            Blood Pressure from Last 3 Encounters:   04/17/18 104/68   08/20/17 135/73   08/02/17 118/66    Weight from Last 3 Encounters:   04/17/18 84.4 kg (186 lb)   01/05/18 84.8 kg (187 lb)   12/06/17 84.9 kg (187 lb 3.2 oz)              Today, you had the following     No orders found for display       Primary Care Provider Office Phone # Fax #    [facility] [phone]       [address] [place] [address]        Equal Access to Services     [facility] : Hadii aad ku hadasho Soomaali, waaxda luqadaha, qaybta kaalmada adeegyada, adry bangura . So [facility] [phone].    ATENCIÓN: Si habla español, tiene a mas disposición servicios gratuitos de asistencia lingüística. Llame al [phone].    We comply with applicable federal civil rights laws and [place] laws. We do not discriminate on the basis of race, color, national origin, age, disability, sex, sexual orientation, or gender identity.            Thank you!     Thank you for choosing [facility]  for your care. Our goal is always to provide you with excellent care. Hearing back from our patients is one way we can continue to improve our services. Please take a few minutes to complete the written survey that you may receive in the mail after your visit with us. Thank you!             Your Updated Medication List - Protect others around you: Learn how to safely use, store and throw away your medicines at www.disposemymeds.org.          This list is accurate as of 5/21/18  4:55 PM.  Always use your most recent med list.                   Brand Name Dispense Instructions for use Diagnosis    acyclovir 400 MG tablet    ZOVIRAX    30 tablet    Take 1 tablet (400 mg) by mouth 2 times daily For 3 days with each outbreak.    Herpes simplex vulvovaginitis       albuterol 108 (90 Base) MCG/ACT Inhaler    PROAIR HFA/PROVENTIL HFA/VENTOLIN HFA    1 Inhaler    Inhale 2 puffs into the lungs every 4 hours as needed for shortness of breath / dyspnea    Mild intermittent asthma without complication       budesonide-formoterol 160-4.5 MCG/ACT Inhaler    SYMBICORT    3 Inhaler    Inhale 2 puffs into the lungs 2 times daily    Mild intermittent asthma without complication       cetirizine 5 MG Chew    zyrTEC     Take 5 mg by mouth daily        IBU PO      Take  by mouth.        meloxicam 7.5 MG tablet    MOBIC    90 tablet    Take 1 tablet (7.5 mg) by mouth daily    SI (sacroiliac) joint dysfunction       VITAMIN D3 PO      Take by mouth daily

## 2018-05-21 NOTE — LETTER
5/21/2018       RE: Valorie Hahn  [address]  [facility] [place] [address]     Dear Colleague,    Thank you for referring your patient, Valorie Hahn, to the [facility] DERMATOLOGY at [facility]. Please see a copy of my visit note below.    [facility] Dermatology Note      Dermatology Problem List:  1. Actinic keratosis, nasal bridge: Cryo 5/21/18    Encounter Date: May 21, 2018    CC:   Chief Complaint   Patient presents with     Derm Problem     Valorie is here regarding a spot on her nose. She states that it has been there for months.      History of Present Illness:  Ms. Valorie Hahn is a 62 year old female who presents for evaluation of a lesion on her nose. The patient first noticed this 3-4 months ago as a rough, scaly spot on her nose. She has repeatedly picked off the scale, but it seems to grow back. It has not been painful and does not bleed spontaneously. The patient is otherwise feeling well with no additional skin concerns at this time.    Past Medical History:   Patient Active Problem List   Diagnosis     Herpes simplex vulvovaginitis     Mild intermittent asthma without complication     CARDIOVASCULAR SCREENING; LDL GOAL LESS THAN 160     Family history of thyroid disease     Advanced directives, counseling/discussion     SI (sacroiliac) joint dysfunction     Osteitis, condensans     Sacroiliac joint disease     SI (sacroiliac) pain     Colon polyps     Chronic low back pain     Left hip pain     Dyspnea     Pain in joint involving ankle and foot, right     Past Medical History:   Diagnosis Date     Arthritis 2012    SI joint, osteoarthritis     Esophageal reflux      Genital herpes, unspecified      Mild intermittent asthma with exacerbation      Past Surgical History:   Procedure Laterality Date     C APPENDECTOMY       C NONSPECIFIC PROCEDURE      Anal fissure repair     COLONOSCOPY      some polyps removed      HC TOOTH EXTRACTION W/FORCEP       Social History:  The patient works in [employer]. 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    Family History:  No family history of melanoma    Medications:  Current Outpatient Prescriptions   Medication Sig Dispense Refill     acyclovir (ZOVIRAX) 400 MG tablet Take 1 tablet (400 mg) by mouth 2 times daily For 3 days with each outbreak. 30 tablet 3     albuterol (PROAIR HFA/PROVENTIL HFA/VENTOLIN HFA) 108 (90 BASE) MCG/ACT Inhaler Inhale 2 puffs into the lungs every 4 hours as needed for shortness of breath / dyspnea 1 Inhaler 11     budesonide-formoterol (SYMBICORT) 160-4.5 MCG/ACT Inhaler Inhale 2 puffs into the lungs 2 times daily 3 Inhaler 1     cetirizine (ZYRTEC) 5 MG CHEW Take 5 mg by mouth daily       Cholecalciferol (VITAMIN D3 PO) Take by mouth daily       Ibuprofen (IBU PO) Take  by mouth.       meloxicam (MOBIC) 7.5 MG tablet Take 1 tablet (7.5 mg) by mouth daily (Patient not taking: Reported on 4/17/2018) 90 tablet 3        Allergies   Allergen Reactions     Penicillin G      Review of Systems:  -As per HPI  -Constitutional: The patient denies fatigue, fevers, chills, unintended weight loss, and night sweats.  -HEENT: Patient denies nonhealing oral sores.  -Skin: As above in HPI. No additional skin concerns.    Physical exam:  Vitals: LMP 02/14/2010  GEN: This is a well developed, well-nourished female in no acute distress, in a pleasant mood.    SKIN: Focused examination of the face, neck, upper chest, and bilateral upper extremities was performed.  - Dorsal nasal bridge with pink, gritty papule with excoriated surface. No arborizing telangiectasias or other features on dermoscopy  - No other lesions of concern on areas examined.     Impression/Plan:  1. Actinic keratosis: Premalignant nature reviewed. Patient offered cryotherapy and accepted. Sun protection recommendations discussed.    Cryotherapy procedure note: After verbal  consent and discussion of risks and benefits including but no limited to dyspigmentation/scar, blister, and pain, 1 actinic keratosis was(were) treated with 1-2mm freeze border for 2 cycles with liquid nitrogen. Post cryotherapy instructions were provided.     Follow-up in 2-3 years for skin check, sooner PRN    Dr. Ryan staffed the patient. Staff Physician Comments:  I saw and evaluated the patient with the resident and I agree with the assessment and plan. I was present for the key portions of the above major procedure and examination.    Staff Involved:  Resident(Karson Espinoza)/Staff(as above)     Again, thank you for allowing me to participate in the care of your patient.      Sincerely,    Andrew Ryan MD

## 2018-05-21 NOTE — NURSING NOTE
Dermatology Rooming Note    Valorie Hahn's goals for this visit include:   Chief Complaint   Patient presents with     Derm Problem     Valorie is here regarding a spot on her nose. She states that it has been there for months.    Opal Quinteros LPN

## 2018-06-01 NOTE — TELEPHONE ENCOUNTER
Called pt and she answered, but we lost connection. Called her back twice and got voicemail. Inform her to call back to complete ACT.    Kulwant Garces MA

## 2018-06-07 NOTE — TELEPHONE ENCOUNTER
Called and lm on vm for ethan back to update act.     Also sent through [employer].     Yuri Rodriguez MA

## 2018-07-13 ENCOUNTER — OFFICE VISIT (OUTPATIENT)
Dept: DERMATOLOGY | Facility: CLINIC | Age: 63
End: 2018-07-13
Payer: COMMERCIAL

## 2018-07-13 DIAGNOSIS — L57.0 AK (ACTINIC KERATOSIS): Primary | ICD-10-CM

## 2018-07-13 RX ORDER — FLUOROURACIL 50 MG/G
CREAM TOPICAL 2 TIMES DAILY
Qty: 40 G | Refills: 0 | Status: SHIPPED | OUTPATIENT
Start: 2018-07-13 | End: 2019-03-01

## 2018-07-13 ASSESSMENT — PAIN SCALES - GENERAL: PAINLEVEL: MILD PAIN (2)

## 2018-07-13 NOTE — PROGRESS NOTES
[facility] Dermatology Note      Dermatology Problem List:  1.AKs - s/p cryo  -efudex to nose started 7/13/18    CC:   Chief Complaint   Patient presents with     Skin Check     Valorie is here today for a skin check- has an area on her nose that is concerning.          Encounter Date: Jul 13, 2018    History of Present Illness:  Ms. Valorie Hahn is a 62 year old female who returns to the dermatology clinic because the site on her nose which was treated with LN2 in May has not gone away. She says that was the first time it was treated with Ln2. She also mentions some additional scaliness on her the sides of her nose as well. She has no hx of skin cancer. She plans to have her next FBSE with her GP the next time she goes in.     Past Medical History:   Patient Active Problem List   Diagnosis     Herpes simplex vulvovaginitis     Mild intermittent asthma without complication     CARDIOVASCULAR SCREENING; LDL GOAL LESS THAN 160     Family history of thyroid disease     Advanced directives, counseling/discussion     SI (sacroiliac) joint dysfunction     Osteitis, condensans     Sacroiliac joint disease     SI (sacroiliac) pain     Colon polyps     Chronic low back pain     Left hip pain     Dyspnea     Pain in joint involving ankle and foot, right     Past Medical History:   Diagnosis Date     Arthritis 2012    SI joint, osteoarthritis     Esophageal reflux      Genital herpes, unspecified      Mild intermittent asthma with exacerbation      Past Surgical History:   Procedure Laterality Date     C APPENDECTOMY       C NONSPECIFIC PROCEDURE      Anal fissure repair     COLONOSCOPY      some polyps removed     HC TOOTH EXTRACTION W/FORCEP         Social History:  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     Family History:  No family history of melanoma    Medications:  Current Outpatient Prescriptions   Medication Sig  Dispense Refill     acyclovir (ZOVIRAX) 400 MG tablet Take 1 tablet (400 mg) by mouth 2 times daily For 3 days with each outbreak. 30 tablet 3     albuterol (PROAIR HFA/PROVENTIL HFA/VENTOLIN HFA) 108 (90 BASE) MCG/ACT Inhaler Inhale 2 puffs into the lungs every 4 hours as needed for shortness of breath / dyspnea 1 Inhaler 11     budesonide-formoterol (SYMBICORT) 160-4.5 MCG/ACT Inhaler Inhale 2 puffs into the lungs 2 times daily 3 Inhaler 1     cetirizine (ZYRTEC) 5 MG CHEW Take 5 mg by mouth daily       Cholecalciferol (VITAMIN D3 PO) Take by mouth daily       fluorouracil (EFUDEX) 5 % cream Apply topically 2 times daily 40 g 0     Ibuprofen (IBU PO) Take  by mouth.       meloxicam (MOBIC) 7.5 MG tablet Take 1 tablet (7.5 mg) by mouth daily (Patient not taking: Reported on 4/17/2018) 90 tablet 3     Allergies   Allergen Reactions     Penicillin G          Review of Systems:  -Constitutional: The patient denies fatigue, fevers, chills, unintended weight loss, and night sweats.  -Skin: As above in HPI. No additional skin concerns.    Physical exam:  Vitals: LMP 02/14/2010  GEN: This is a well developed, well-nourished female in no acute distress, in a pleasant mood.    SKIN: Sun-exposed skin, which includes the head/face, neck, both arms, digits, and/or nails was examined.   -hyperkeratotic papule on the centra bridge of the nose with some scaliness and grittiness lasterally on each side of the nose  -No other lesions of concern on areas examined.     Impression/Plan:  1. Actinic keratosis - bridge of nose x 3    Cryotherapy procedure note: After verbal consent and discussion of risks and benefits including but no limited to dyspigmentation/scar, blister, and pain, 1 was(were) treated with 1-2mm freeze border for 2 cycles with liquid nitrogen. Post cryotherapy instructions were provided.     Start Efudex 5% cream on the entirety of the nose BID x 14 days - after site from LN2 heals up    If the lesion on the central  nose does not resolve following LN2 and efudex, we will plan a bx    CC Dr. Hodge on close of this encounter.  Follow-up in 3 months, earlier for new or changing lesions.       Staff Involved:  Staff Only  All risks, benefits and alternatives were discussed with patient.  Patient is in agreement and understands the assessment and plan.  All questions were answered.    Temi Araiza PA-C  [facility]: Phone: [phone], Fax: [phone]

## 2018-07-13 NOTE — LETTER
7/13/2018       RE: Valorie Hahn  [address]  [facility] [address]     Dear Colleague,    Thank you for referring your patient, Valorie Hahn, to the [facility] DERMATOLOGY at [facility]. Please see a copy of my visit note below.    [facility] Dermatology Note      Dermatology Problem List:  1.AKs - s/p cryo  -efudex to nose started 7/13/18    CC:   Chief Complaint   Patient presents with     Skin Check     Valorie is here today for a skin check- has an area on her nose that is concerning.          Encounter Date: Jul 13, 2018    History of Present Illness:  Ms. Valorie Hahn is a 62 year old female who returns to the dermatology clinic because the site on her nose which was treated with LN2 in May has not gone away. She says that was the first time it was treated with Ln2. She also mentions some additional scaliness on her the sides of her nose as well. She has no hx of skin cancer. She plans to have her next FBSE with her GP the next time she goes in.     Past Medical History:   Patient Active Problem List   Diagnosis     Herpes simplex vulvovaginitis     Mild intermittent asthma without complication     CARDIOVASCULAR SCREENING; LDL GOAL LESS THAN 160     Family history of thyroid disease     Advanced directives, counseling/discussion     SI (sacroiliac) joint dysfunction     Osteitis, condensans     Sacroiliac joint disease     SI (sacroiliac) pain     Colon polyps     Chronic low back pain     Left hip pain     Dyspnea     Pain in joint involving ankle and foot, right     Past Medical History:   Diagnosis Date     Arthritis 2012    SI joint, osteoarthritis     Esophageal reflux      Genital herpes, unspecified      Mild intermittent asthma with exacerbation      Past Surgical History:   Procedure Laterality Date     C APPENDECTOMY       C NONSPECIFIC PROCEDURE      Anal fissure repair     COLONOSCOPY      some polyps  removed     HC TOOTH EXTRACTION W/FORCEP         Social History:  The patient works in [employer]. 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     Family History:  No family history of melanoma    Medications:  Current Outpatient Prescriptions   Medication Sig Dispense Refill     acyclovir (ZOVIRAX) 400 MG tablet Take 1 tablet (400 mg) by mouth 2 times daily For 3 days with each outbreak. 30 tablet 3     albuterol (PROAIR HFA/PROVENTIL HFA/VENTOLIN HFA) 108 (90 BASE) MCG/ACT Inhaler Inhale 2 puffs into the lungs every 4 hours as needed for shortness of breath / dyspnea 1 Inhaler 11     budesonide-formoterol (SYMBICORT) 160-4.5 MCG/ACT Inhaler Inhale 2 puffs into the lungs 2 times daily 3 Inhaler 1     cetirizine (ZYRTEC) 5 MG CHEW Take 5 mg by mouth daily       Cholecalciferol (VITAMIN D3 PO) Take by mouth daily       fluorouracil (EFUDEX) 5 % cream Apply topically 2 times daily 40 g 0     Ibuprofen (IBU PO) Take  by mouth.       meloxicam (MOBIC) 7.5 MG tablet Take 1 tablet (7.5 mg) by mouth daily (Patient not taking: Reported on 4/17/2018) 90 tablet 3     Allergies   Allergen Reactions     Penicillin G          Review of Systems:  -Constitutional: The patient denies fatigue, fevers, chills, unintended weight loss, and night sweats.  -Skin: As above in HPI. No additional skin concerns.    Physical exam:  Vitals: LMP 02/14/2010  GEN: This is a well developed, well-nourished female in no acute distress, in a pleasant mood.    SKIN: Sun-exposed skin, which includes the head/face, neck, both arms, digits, and/or nails was examined.   -hyperkeratotic papule on the centra bridge of the nose with some scaliness and grittiness lasterally on each side of the nose  -No other lesions of concern on areas examined.     Impression/Plan:  1. Actinic keratosis - bridge of nose x 3    Cryotherapy procedure note: After verbal consent and discussion of risks and benefits including but no limited to  dyspigmentation/scar, blister, and pain, 1 was(were) treated with 1-2mm freeze border for 2 cycles with liquid nitrogen. Post cryotherapy instructions were provided.     Start Efudex 5% cream on the entirety of the nose BID x 14 days - after site from LN2 heals up    If the lesion on the central nose does not resolve following LN2 and efudex, we will plan a bx    CC Dr. Hodge on close of this encounter.  Follow-up in 3 months, earlier for new or changing lesions.       Staff Involved:  Staff Only  All risks, benefits and alternatives were discussed with patient.  Patient is in agreement and understands the assessment and plan.  All questions were answered.    Temi Araiza PA-C  [facility]: Phone: [phone], Fax: [phone]

## 2018-07-13 NOTE — PATIENT INSTRUCTIONS
Cryotherapy    What is it?    Use of a very cold liquid, such as liquid nitrogen, to freeze and destroy abnormal skin cells that need to be removed    What should I expect?    Tenderness and redness    A small blister that might grow and fill with dark purple blood. There may be crusting.    More than one treatment may be needed if the lesions do not go away.    How do I care for the treated area?    Gently wash the area with your hands when bathing.    Use a thin layer of Vaseline to help with healing. You may use a Band-Aid.     The area should heal within 7-10 days and may leave behind a pink or lighter color.     Do not use an antibiotic or Neosporin ointment.     You may take acetaminophen (Tylenol) for pain.     Call your Doctor if you have:    Severe pain    Signs of infection (warmth, redness, cloudy yellow drainage, and or a bad smell)    Questions or concerns    Who should I call with questions?       [facility]: [phone]       [facility]: [phone]       For urgent needs outside of business hours call the [facility] at [phone]        and ask for the dermatology resident on call

## 2018-07-13 NOTE — MR AVS SNAPSHOT
After Visit Summary   7/13/2018    Valorie Hahn    MRN: [id number]           Patient Information     Date Of Birth          1955        Visit Information        Provider Department      7/13/2018 1:00 PM Temi Araiza PA-C [facility] Dermatology        Today's Diagnoses     AK (actinic keratosis)    -  1      Care Instructions    Cryotherapy    What is it?    Use of a very cold liquid, such as liquid nitrogen, to freeze and destroy abnormal skin cells that need to be removed    What should I expect?    Tenderness and redness    A small blister that might grow and fill with dark purple blood. There may be crusting.    More than one treatment may be needed if the lesions do not go away.    How do I care for the treated area?    Gently wash the area with your hands when bathing.    Use a thin layer of Vaseline to help with healing. You may use a Band-Aid.     The area should heal within 7-10 days and may leave behind a pink or lighter color.     Do not use an antibiotic or Neosporin ointment.     You may take acetaminophen (Tylenol) for pain.     Call your Doctor if you have:    Severe pain    Signs of infection (warmth, redness, cloudy yellow drainage, and or a bad smell)    Questions or concerns    Who should I call with questions?       [facility]: [phone]       [facility]: [phone]       For urgent needs outside of business hours call the [facility] at [phone]        and ask for the dermatology resident on call            Follow-ups after your visit        Your next 10 appointments already scheduled     Jul 19, 2018  1:20 PM CDT   (Arrive by 1:05 PM)   [facility] with NOAH Lopez [facility] Ophthalmology ([facility])    [address] [address] [phone]              Who to contact     Please call your clinic at  [phone] to:    Ask questions about your health    Make or cancel appointments    Discuss your medicines    Learn about your test results    Speak to your doctor            Additional Information About Your Visit        [employer] Information     [employer] gives you secure access to your electronic health record. If you see a primary care provider, you can also send messages to your care team and make appointments. If you have questions, please call your primary care clinic.  If you do not have a primary care provider, please call [phone] and they will assist you.      [employer] is an electronic gateway that provides easy, online access to your medical records. With [employer], you can request a clinic appointment, read your test results, renew a prescription or communicate with your care team.     To access your existing account, please contact your [facility] or call [phone] for assistance.        Care EveryWhere ID     This is your Care EveryWhere ID. This could be used by other organizations to access your [place] medical records  [id number]        Your Vitals Were     Last Period                   02/14/2010            Blood Pressure from Last 3 Encounters:   04/17/18 104/68   08/20/17 135/73   08/02/17 118/66    Weight from Last 3 Encounters:   04/17/18 84.4 kg (186 lb)   01/05/18 84.8 kg (187 lb)   12/06/17 84.9 kg (187 lb 3.2 oz)              Today, you had the following     No orders found for display         Today's Medication Changes          These changes are accurate as of 7/13/18  1:40 PM.  If you have any questions, ask your nurse or doctor.               Start taking these medicines.        Dose/Directions    fluorouracil 5 % cream   Commonly known as:  EFUDEX   Used for:  AK (actinic keratosis)   Started by:  Temi Araiza PA-C        Apply topically 2 times daily   Quantity:  40 g   Refills:  0            Where to get your medicines      These medications  were sent to [facility] [address] [place], [place] - [address] AT [facility] [address]  [address], [facility] [address]    Hours:  24-hours Phone:  [phone]     fluorouracil 5 % cream                Primary Care Provider Office Phone # Fax #    [facility] [phone] [address] [place] [address]        Equal Access to Services     CARY BRUSH : Hadii aad ku hadasho Soomaali, waaxda luqadaha, qaybta kaalmada adeegyada, waxay idiin hayaan adeeg jose bangura . So [facility] [phone].    ATENCIÓN: Si habla español, tiene a mas disposición servicios gratuitos de asistencia lingüística. [facility] [phone].    We comply with applicable federal civil rights laws and [place] laws. We do not discriminate on the basis of race, color, national origin, age, disability, sex, sexual orientation, or gender identity.            Thank you!     Thank you for choosing [facility] DERMATOLOGY  for your care. Our goal is always to provide you with excellent care. Hearing back from our patients is one way we can continue to improve our services. Please take a few minutes to complete the written survey that you may receive in the mail after your visit with us. Thank you!             Your Updated Medication List - Protect others around you: Learn how to safely use, store and throw away your medicines at www.disposemymeds.org.          This list is accurate as of 7/13/18  1:40 PM.  Always use your most recent med list.                   Brand Name Dispense Instructions for use Diagnosis    acyclovir 400 MG tablet    ZOVIRAX    30 tablet    Take 1 tablet (400 mg) by mouth 2 times daily For 3 days with each outbreak.    Herpes simplex vulvovaginitis       albuterol 108 (90 Base) MCG/ACT Inhaler    PROAIR HFA/PROVENTIL HFA/VENTOLIN HFA    1 Inhaler    Inhale 2 puffs into the lungs every 4 hours as needed for shortness of breath / dyspnea     Mild intermittent asthma without complication       budesonide-formoterol 160-4.5 MCG/ACT Inhaler    SYMBICORT    3 Inhaler    Inhale 2 puffs into the lungs 2 times daily    Mild intermittent asthma without complication       cetirizine 5 MG Chew    zyrTEC     Take 5 mg by mouth daily        fluorouracil 5 % cream    EFUDEX    40 g    Apply topically 2 times daily    AK (actinic keratosis)       IBU PO      Take  by mouth.        meloxicam 7.5 MG tablet    MOBIC    90 tablet    Take 1 tablet (7.5 mg) by mouth daily    SI (sacroiliac) joint dysfunction       VITAMIN D3 PO      Take by mouth daily

## 2018-07-13 NOTE — NURSING NOTE
Dermatology Rooming Note    Valorie Hahn's goals for this visit include:   Chief Complaint   Patient presents with     Skin Check     Valorie is here today for a skin check- has an area on her nose that is concerning.      Hortensia Moreira MA

## 2018-07-19 ENCOUNTER — OFFICE VISIT (OUTPATIENT)
Dept: OPHTHALMOLOGY | Facility: CLINIC | Age: 63
End: 2018-07-19
Payer: COMMERCIAL

## 2018-07-19 DIAGNOSIS — H25.13 NUCLEAR SENILE CATARACT OF BOTH EYES: Primary | ICD-10-CM

## 2018-07-19 ASSESSMENT — KERATOMETRY
OD_AXISANGLE_DEGREES: 56
OD_K1POWER_DIOPTERS: 45.00
OS_AXISANGLE_DEGREES: 150
OS_K1POWER_DIOPTERS: 44.50
OD_K2POWER_DIOPTERS: 45.25
OS_AXISANGLE2_DEGREES: 60
METHOD_AUTO_MANUAL: AUTO-K
OD_AXISANGLE2_DEGREES: 146
OS_K2POWER_DIOPTERS: 44.75

## 2018-07-19 ASSESSMENT — REFRACTION
OS_SPHERE: -4.00
OS_CYLINDER: +2.25
OS_AXIS: 145

## 2018-07-19 ASSESSMENT — REFRACTION_CURRENTRX
OS_DIAMETER: 9.6
OS_BASECURVE: 7.6
OS_BRAND: RGP
OS_SPHERE: -3.00

## 2018-07-19 ASSESSMENT — REFRACTION_WEARINGRX
OD_ADD: +2.50
OD_CYLINDER: +1.50
OS_ADD: +2.50
OS_CYLINDER: +1.75
OS_AXIS: 138
OD_SPHERE: -5.75
SPECS_TYPE: PAL
OD_AXIS: 175
OS_SPHERE: -3.75

## 2018-07-19 ASSESSMENT — SLIT LAMP EXAM - LIDS
COMMENTS: NORMAL
COMMENTS: NORMAL

## 2018-07-19 ASSESSMENT — REFRACTION_MANIFEST
OS_SPHERE: -4.00
OD_ADD: +2.50
OD_AXIS: 010
OD_CYLINDER: +1.25
OD_SPHERE: -5.75
OS_AXIS: 137
OS_CYLINDER: +1.75
OS_ADD: +2.50

## 2018-07-19 ASSESSMENT — VISUAL ACUITY
OS_CC: 20/40
OD_CC: 20/25
OS_PH_SC: 20/30+2
METHOD: SNELLEN - LINEAR
CORRECTION_TYPE: GLASSES
OD_CC+: +2
OD_CC: J1+OU

## 2018-07-19 ASSESSMENT — CONF VISUAL FIELD
METHOD: COUNTING FINGERS
OS_NORMAL: 1
OD_NORMAL: 1

## 2018-07-19 ASSESSMENT — TONOMETRY
OD_IOP_MMHG: 12
OS_IOP_MMHG: 13
IOP_METHOD: ICARE

## 2018-07-19 ASSESSMENT — CUP TO DISC RATIO
OS_RATIO: 0.25
OD_RATIO: 0.25

## 2018-07-19 NOTE — NURSING NOTE
Chief Complaints and History of Present Illnesses   Patient presents with     Annual Eye Exam     HPI    Affected eye(s):  Left   Symptoms:     Blurred vision      Frequency:  Constant       Do you have eye pain now?:  No      Comments:  Left eye is blurry. Not able to read street signs with that eye. Left eye has been that way for a while, last eye doctor said that is how the eye is. Denies eye pain or irritation. Does not take eye drops.    Camila Bruce COT 1:15 PM July 19, 2018

## 2018-07-19 NOTE — MR AVS SNAPSHOT
After Visit Summary   7/19/2018    Valorie Hahn    MRN: [id number]           Patient Information     Date Of Birth          1955        Visit Information        Provider Department      7/19/2018 1:20 PM Belkis Ku OD M [facility] Ophthalmology        Today's Diagnoses     Nuclear senile cataract of both eyes    -  1       Follow-ups after your visit        Your next 10 appointments already scheduled     Sep 11, 2018  9:30 AM CDT   (Arrive by 9:15 AM)   Return Visit with MARIO Molina [facility] Dermatology ([facility])    [address] [place] [address] [phone]              Who to contact     Please call your clinic at [phone] to:    Ask questions about your health    Make or cancel appointments    Discuss your medicines    Learn about your test results    Speak to your doctor            Additional Information About Your Visit        MyChart Information     Minka gives you secure access to your electronic health record. If you see a primary care provider, you can also send messages to your care team and make appointments. If you have questions, please call your primary care clinic.  If you do not have a primary care provider, please call [phone] and they will assist you.      Minka is an electronic gateway that provides easy, online access to your medical records. With Minka, you can request a clinic appointment, read your test results, renew a prescription or communicate with your care team.     To access your existing account, please contact your [facility] or call [phone] for assistance.        Care EveryWhere ID     This is your Care EveryWhere ID. This could be used by other organizations to access your [place] medical records  [id number]        Your Vitals Were     Last Period                   02/14/2010            Blood Pressure from Last 3  Encounters:   04/17/18 104/68   08/20/17 135/73   08/02/17 118/66    Weight from Last 3 Encounters:   04/17/18 84.4 kg (186 lb)   01/05/18 84.8 kg (187 lb)   12/06/17 84.9 kg (187 lb 3.2 oz)              Today, you had the following     No orders found for display       Primary Care Provider Office Phone # Fax #    Lisbeth Ruiz, BC [facility] [phone] [address] [place] [address]        Equal Access to Services     [facility] : Hadii payal ku hadasho Sotrell, waaxda luqadaha, qaybta kaalmada miltonyaangeles, adry bangura . So [facility] [phone].    ATENCIÓN: Si habla español, tiene a mas disposición servicios gratuitos de asistencia lingüística. [facility] [phone].    We comply with applicable federal civil rights laws and [place] laws. We do not discriminate on the basis of race, color, national origin, age, disability, sex, sexual orientation, or gender identity.            Thank you!     Thank you for choosing [facility] OPHTHALMOLOGY  for your care. Our goal is always to provide you with excellent care. Hearing back from our patients is one way we can continue to improve our services. Please take a few minutes to complete the written survey that you may receive in the mail after your visit with us. Thank you!             Your Updated Medication List - Protect others around you: Learn how to safely use, store and throw away your medicines at www.disposemymeds.org.          This list is accurate as of 7/19/18  2:36 PM.  Always use your most recent med list.                   Brand Name Dispense Instructions for use Diagnosis    acyclovir 400 MG tablet    ZOVIRAX    30 tablet    Take 1 tablet (400 mg) by mouth 2 times daily For 3 days with each outbreak.    Herpes simplex vulvovaginitis       albuterol 108 (90 Base) MCG/ACT Inhaler    PROAIR HFA/PROVENTIL HFA/VENTOLIN HFA    1 Inhaler    Inhale 2 puffs into the lungs every 4 hours as needed for  shortness of breath / dyspnea    Mild intermittent asthma without complication       budesonide-formoterol 160-4.5 MCG/ACT Inhaler    SYMBICORT    3 Inhaler    Inhale 2 puffs into the lungs 2 times daily    Mild intermittent asthma without complication       cetirizine 5 MG Chew    zyrTEC     Take 5 mg by mouth daily        fluorouracil 5 % cream    EFUDEX    40 g    Apply topically 2 times daily    AK (actinic keratosis)       IBU PO      Take  by mouth.        meloxicam 7.5 MG tablet    MOBIC    90 tablet    Take 1 tablet (7.5 mg) by mouth daily    SI (sacroiliac) joint dysfunction       VITAMIN D3 PO      Take by mouth daily

## 2018-07-19 NOTE — PROGRESS NOTES
A/P  1.) Cataracts OS>OD  -C/o worsening vision left eye over past several years  -Mild PSC cataract left eye consistent with complaints  -Macular issues and irregular astigmatism ruled out  -Reviewed findings with pt and possibility for cataract surgery    She will hold on surgery referral for now and consider consult when insurance changes    I have confirmed the patient's CC, HPI and reviewed Past Medical History, Past Surgical History, Social History, Family History, Problem List, Medication List and agree with Tech note.     Belkis Ku, OD FAAO

## 2018-09-11 ENCOUNTER — OFFICE VISIT (OUTPATIENT)
Dept: DERMATOLOGY | Facility: CLINIC | Age: 63
End: 2018-09-11
Payer: COMMERCIAL

## 2018-09-11 DIAGNOSIS — L57.0 AK (ACTINIC KERATOSIS): Primary | ICD-10-CM

## 2018-09-11 ASSESSMENT — PAIN SCALES - GENERAL: PAINLEVEL: NO PAIN (0)

## 2018-09-11 NOTE — LETTER
9/11/2018     RE: Valorie Hahn  [address]  [facility] [address]     Dear Colleague,    Thank you for referring your patient, Valorie Hahn, to the [facility] DERMATOLOGY at [facility]. Please see a copy of my visit note below.    [facility] Dermatology Note      Dermatology Problem List:  1.AKs - s/p cryo    CC:   Chief Complaint   Patient presents with     Derm Problem     Valorie is here today for an AK follow up- notes her nose healed up well.          Encounter Date: Sep 11, 2018    History of Present Illness:  Ms. Valorie Hahn is a 62 year old female who returns to the dermatology clinic to have her nose recheckd following cryo for a few Aks. She reports the nose has healed really well with no residual lesions that she can tell. She was going to use efudex on the nose, but decided not one once the nose healed from the LN2. She also mentions a similar dry spot on her right forehead and left forearm which have been there a long time and do not resolve with moisturizers. She has no previous hx of skin cancer.     Past Medical History:   Patient Active Problem List   Diagnosis     Herpes simplex vulvovaginitis     Mild intermittent asthma without complication     CARDIOVASCULAR SCREENING; LDL GOAL LESS THAN 160     Family history of thyroid disease     Advanced directives, counseling/discussion     SI (sacroiliac) joint dysfunction     Osteitis, condensans     Sacroiliac joint disease     SI (sacroiliac) pain     Colon polyps     Chronic low back pain     Left hip pain     Dyspnea     Pain in joint involving ankle and foot, right     Past Medical History:   Diagnosis Date     Arthritis 2012    SI joint, osteoarthritis     Esophageal reflux      Genital herpes, unspecified      Mild intermittent asthma with exacerbation      Past Surgical History:   Procedure Laterality Date     C APPENDECTOMY       C NONSPECIFIC  PROCEDURE      Anal fissure repair     COLONOSCOPY      some polyps removed     HC TOOTH EXTRACTION W/FORCEP         Social History:  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      Family History:  No family history of melanoma    Medications:  Current Outpatient Prescriptions   Medication Sig Dispense Refill     acyclovir (ZOVIRAX) 400 MG tablet Take 1 tablet (400 mg) by mouth 2 times daily For 3 days with each outbreak. 30 tablet 3     albuterol (PROAIR HFA/PROVENTIL HFA/VENTOLIN HFA) 108 (90 BASE) MCG/ACT Inhaler Inhale 2 puffs into the lungs every 4 hours as needed for shortness of breath / dyspnea 1 Inhaler 11     budesonide-formoterol (SYMBICORT) 160-4.5 MCG/ACT Inhaler Inhale 2 puffs into the lungs 2 times daily 3 Inhaler 1     cetirizine (ZYRTEC) 5 MG CHEW Take 5 mg by mouth daily       Cholecalciferol (VITAMIN D3 PO) Take by mouth daily       fluorouracil (EFUDEX) 5 % cream Apply topically 2 times daily 40 g 0     Ibuprofen (IBU PO) Take  by mouth.       meloxicam (MOBIC) 7.5 MG tablet Take 1 tablet (7.5 mg) by mouth daily (Patient not taking: Reported on 4/17/2018) 90 tablet 3     Allergies   Allergen Reactions     Penicillin G          Review of Systems:  -Constitutional: The patient denies fatigue, fevers, chills, unintended weight loss, and night sweats.  -Skin: As above in HPI. No additional skin concerns.    Physical exam:  Vitals: LMP 02/14/2010  GEN: This is a well developed, well-nourished female in no acute distress, in a pleasant mood.    SKIN: Sun-exposed skin, which includes the head/face, neck, both arms, digits, and/or nails was examined.   -There are erythematous macules with overyling adherent scale on the right forehead and left forearm  -nose has healed well with no signs of residual actinic damage.   -No other lesions of concern on areas examined.     Impression/Plan:  1. Actinic keratosis x 2 - right forehead (x1), left  forearm (x1), nose has healed since last visit    Cryotherapy procedure note: After verbal consent and discussion of risks and benefits including but no limited to dyspigmentation/scar, blister, and pain, 2 was(were) treated with 1-2mm freeze border for 2 cycles with liquid nitrogen. Post cryotherapy instructions were provided.     Rechecked nose - healed well with no sign of residual actinic damage    edu on need for annual FBSE. Patient to schedule this sometime later this year    CC Dr. Clay on close of this encounter.  Follow-up in 3 months, earlier for new or changing lesions.     Staff Involved:  Staff Only  All risks, benefits and alternatives were discussed with patient.  Patient is in agreement and understands the assessment and plan.  All questions were answered.    Temi Araiza PA-C  [facility]: Phone: [phone], Fax: [phone]

## 2018-09-11 NOTE — PROGRESS NOTES
[facility] Dermatology Note      Dermatology Problem List:  1.AKs - s/p cryo    CC:   Chief Complaint   Patient presents with     Derm Problem     Valorie is here today for an AK follow up- notes her nose healed up well.          Encounter Date: Sep 11, 2018    History of Present Illness:  Ms. Valorie Hahn is a 62 year old female who returns to the dermatology clinic to have her nose recheckd following cryo for a few Aks. She reports the nose has healed really well with no residual lesions that she can tell. She was going to use efudex on the nose, but decided not one once the nose healed from the LN2. She also mentions a similar dry spot on her right forehead and left forearm which have been there a long time and do not resolve with moisturizers. She has no previous hx of skin cancer.     Past Medical History:   Patient Active Problem List   Diagnosis     Herpes simplex vulvovaginitis     Mild intermittent asthma without complication     CARDIOVASCULAR SCREENING; LDL GOAL LESS THAN 160     Family history of thyroid disease     Advanced directives, counseling/discussion     SI (sacroiliac) joint dysfunction     Osteitis, condensans     Sacroiliac joint disease     SI (sacroiliac) pain     Colon polyps     Chronic low back pain     Left hip pain     Dyspnea     Pain in joint involving ankle and foot, right     Past Medical History:   Diagnosis Date     Arthritis 2012    SI joint, osteoarthritis     Esophageal reflux      Genital herpes, unspecified      Mild intermittent asthma with exacerbation      Past Surgical History:   Procedure Laterality Date     C APPENDECTOMY       C NONSPECIFIC PROCEDURE      Anal fissure repair     COLONOSCOPY      some polyps removed     HC TOOTH EXTRACTION W/FORCEP         Social History:  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      Family History:  No family history of  melanoma    Medications:  Current Outpatient Prescriptions   Medication Sig Dispense Refill     acyclovir (ZOVIRAX) 400 MG tablet Take 1 tablet (400 mg) by mouth 2 times daily For 3 days with each outbreak. 30 tablet 3     albuterol (PROAIR HFA/PROVENTIL HFA/VENTOLIN HFA) 108 (90 BASE) MCG/ACT Inhaler Inhale 2 puffs into the lungs every 4 hours as needed for shortness of breath / dyspnea 1 Inhaler 11     budesonide-formoterol (SYMBICORT) 160-4.5 MCG/ACT Inhaler Inhale 2 puffs into the lungs 2 times daily 3 Inhaler 1     cetirizine (ZYRTEC) 5 MG CHEW Take 5 mg by mouth daily       Cholecalciferol (VITAMIN D3 PO) Take by mouth daily       fluorouracil (EFUDEX) 5 % cream Apply topically 2 times daily 40 g 0     Ibuprofen (IBU PO) Take  by mouth.       meloxicam (MOBIC) 7.5 MG tablet Take 1 tablet (7.5 mg) by mouth daily (Patient not taking: Reported on 4/17/2018) 90 tablet 3     Allergies   Allergen Reactions     Penicillin G          Review of Systems:  -Constitutional: The patient denies fatigue, fevers, chills, unintended weight loss, and night sweats.  -Skin: As above in HPI. No additional skin concerns.    Physical exam:  Vitals: LMP 02/14/2010  GEN: This is a well developed, well-nourished female in no acute distress, in a pleasant mood.    SKIN: Sun-exposed skin, which includes the head/face, neck, both arms, digits, and/or nails was examined.   -There are erythematous macules with overyling adherent scale on the right forehead and left forearm  -nose has healed well with no signs of residual actinic damage.   -No other lesions of concern on areas examined.     Impression/Plan:  1. Actinic keratosis x 2 - right forehead (x1), left forearm (x1), nose has healed since last visit    Cryotherapy procedure note: After verbal consent and discussion of risks and benefits including but no limited to dyspigmentation/scar, blister, and pain, 2 was(were) treated with 1-2mm freeze border for 2 cycles with liquid nitrogen.  Post cryotherapy instructions were provided.     Rechecked nose - healed well with no sign of residual actinic damage    edu on need for annual FBSE. Patient to schedule this sometime later this year    CC Dr. Clay on close of this encounter.  Follow-up in 3 months, earlier for new or changing lesions.       Staff Involved:  Staff Only  All risks, benefits and alternatives were discussed with patient.  Patient is in agreement and understands the assessment and plan.  All questions were answered.    Temi Araiza PA-C  [facility]: Phone: [phone], Fax: [phone]

## 2018-09-11 NOTE — NURSING NOTE
Dermatology Rooming Note    Valorie Hahn's goals for this visit include:   Chief Complaint   Patient presents with     Derm Problem     Valorie is here today for an AK follow up- notes her nose healed up well.      Hortensia Moreira MA

## 2018-09-11 NOTE — MR AVS SNAPSHOT
After Visit Summary   9/11/2018    Valorie Hahn    MRN: [id number]           Patient Information     Date Of Birth          1955        Visit Information        Provider Department      9/11/2018 9:30 AM Temi Araiza PA-C M [facility] Dermatology        Today's Diagnoses     AK (actinic keratosis)    -  1       Follow-ups after your visit        Follow-up notes from your care team     Return in about 3 months (around 12/11/2018).      Who to contact     Please call your clinic at [phone] to:    Ask questions about your health    Make or cancel appointments    Discuss your medicines    Learn about your test results    Speak to your doctor            Additional Information About Your Visit        LontraharPlerts Information     Catawiki gives you secure access to your electronic health record. If you see a primary care provider, you can also send messages to your care team and make appointments. If you have questions, please call your primary care clinic.  If you do not have a primary care provider, please call [phone] and they will assist you.      Catawiki is an electronic gateway that provides easy, online access to your medical records. With Catawiki, you can request a clinic appointment, read your test results, renew a prescription or communicate with your care team.     To access your existing account, please contact your [facility] or call [phone] for assistance.        Care EveryWhere ID     This is your Care EveryWhere ID. This could be used by other organizations to access your [facility] medical records  [id number]        Your Vitals Were     Last Period                   02/14/2010            Blood Pressure from Last 3 Encounters:   04/17/18 104/68   08/20/17 135/73   08/02/17 118/66    Weight from Last 3 Encounters:   04/17/18 84.4 kg (186 lb)   01/05/18 84.8 kg (187 lb)   12/06/17 84.9 kg (187 lb 3.2 oz)              We Performed the  Following     DESTRUCT PREMALIGNANT LESION, 2-14     DESTRUCT PREMALIGNANT LESION, FIRST        Primary Care Provider Office Phone # Fax #    BC Benedict [facility] [phone] [address] [place] [address]        Equal Access to Services     CARY BRUSH : Hadii aad ku hadasho Soomaali, waaxda luqadaha, qaybta kaalmada adeegyada, waxay lelain hayaan adejudit hoskinsthaomaria bangura . So [facility] [phone].    ATENCIÓN: Si habla español, tiene a mas disposición servicios gratuitos de asistencia lingüística. [facility] [phone].    We comply with applicable federal civil rights laws and [place] laws. We do not discriminate on the basis of race, color, national origin, age, disability, sex, sexual orientation, or gender identity.            Thank you!     Thank you for choosing [facility] DERMATOLOGY  for your care. Our goal is always to provide you with excellent care. Hearing back from our patients is one way we can continue to improve our services. Please take a few minutes to complete the written survey that you may receive in the mail after your visit with us. Thank you!             Your Updated Medication List - Protect others around you: Learn how to safely use, store and throw away your medicines at www.disposemymeds.org.          This list is accurate as of 9/11/18 10:12 AM.  Always use your most recent med list.                   Brand Name Dispense Instructions for use Diagnosis    acyclovir 400 MG tablet    ZOVIRAX    30 tablet    Take 1 tablet (400 mg) by mouth 2 times daily For 3 days with each outbreak.    Herpes simplex vulvovaginitis       albuterol 108 (90 Base) MCG/ACT inhaler    PROAIR HFA/PROVENTIL HFA/VENTOLIN HFA    1 Inhaler    Inhale 2 puffs into the lungs every 4 hours as needed for shortness of breath / dyspnea    Mild intermittent asthma without complication       budesonide-formoterol 160-4.5 MCG/ACT Inhaler    SYMBICORT    3 Inhaler    Inhale 2 puffs into  the lungs 2 times daily    Mild intermittent asthma without complication       cetirizine 5 MG Chew    zyrTEC     Take 5 mg by mouth daily        fluorouracil 5 % cream    EFUDEX    40 g    Apply topically 2 times daily    AK (actinic keratosis)       IBU PO      Take  by mouth.        meloxicam 7.5 MG tablet    MOBIC    90 tablet    Take 1 tablet (7.5 mg) by mouth daily    SI (sacroiliac) joint dysfunction       VITAMIN D3 PO      Take by mouth daily

## 2018-09-26 DIAGNOSIS — M25.561 RIGHT KNEE PAIN, UNSPECIFIED CHRONICITY: Primary | ICD-10-CM

## 2018-09-27 ENCOUNTER — RADIANT APPOINTMENT (OUTPATIENT)
Dept: GENERAL RADIOLOGY | Facility: CLINIC | Age: 63
End: 2018-09-27
Payer: COMMERCIAL

## 2018-09-27 ENCOUNTER — OFFICE VISIT (OUTPATIENT)
Dept: ORTHOPEDICS | Facility: CLINIC | Age: 63
End: 2018-09-27
Payer: COMMERCIAL

## 2018-09-27 VITALS
WEIGHT: 180 LBS | HEART RATE: 76 BPM | BODY MASS INDEX: 30.73 KG/M2 | DIASTOLIC BLOOD PRESSURE: 77 MMHG | SYSTOLIC BLOOD PRESSURE: 133 MMHG | HEIGHT: 64 IN

## 2018-09-27 DIAGNOSIS — M25.561 ACUTE PAIN OF RIGHT KNEE: Primary | ICD-10-CM

## 2018-09-27 DIAGNOSIS — M25.561 RIGHT KNEE PAIN, UNSPECIFIED CHRONICITY: ICD-10-CM

## 2018-09-27 NOTE — MR AVS SNAPSHOT
"              After Visit Summary   9/27/2018    Valorie Hahn    MRN: [id number]           Patient Information     Date Of Birth          1955        Visit Information        Provider Department      9/27/2018 9:00 AM Grant Canada MD [facility] Sports Medicine        Today's Diagnoses     Acute pain of right knee    -  1       Follow-ups after your visit        Who to contact     Please call your clinic at [phone] to:    Ask questions about your health    Make or cancel appointments    Discuss your medicines    Learn about your test results    Speak to your doctor            Additional Information About Your Visit        MyChart Information     Blipify gives you secure access to your electronic health record. If you see a primary care provider, you can also send messages to your care team and make appointments. If you have questions, please call your primary care clinic.  If you do not have a primary care provider, please call [phone] and they will assist you.      Blipify is an electronic gateway that provides easy, online access to your medical records. With Blipify, you can request a clinic appointment, read your test results, renew a prescription or communicate with your care team.     To access your existing account, please contact your [facility] or call [phone] for assistance.        Care EveryWhere ID     This is your Care EveryWhere ID. This could be used by other organizations to access your [place] medical records  [id number]        Your Vitals Were     Pulse Height Last Period BMI (Body Mass Index)          76 5' 4\" (1.626 m) 02/14/2010 30.9 kg/m2         Blood Pressure from Last 3 Encounters:   09/27/18 133/77   04/17/18 104/68   08/20/17 135/73    Weight from Last 3 Encounters:   09/27/18 180 lb (81.6 kg)   04/17/18 186 lb (84.4 kg)   01/05/18 187 lb (84.8 kg)              Today, you had the following     No orders found for display "       Primary Care Provider Office Phone # Fax #    BC Benedict [facility] [phone] [address] [place] [address]        Equal Access to Services     CARY BRUSH : Hadii aad ku hadannabelleo Soalvaali, waaxda luqadaha, qaybta kaalmada adeegyada, waxxochitl idiin tracyn davidjudit garcia willem sandoval. So [facility] [phone].    ATENCIÓN: Si habla español, tiene a mas disposición servicios gratuitos de asistencia lingüística. Llame al [phone].    We comply with applicable federal civil rights laws and [place] laws. We do not discriminate on the basis of race, color, national origin, age, disability, sex, sexual orientation, or gender identity.            Thank you!     Thank you for choosing [facility]  for your care. Our goal is always to provide you with excellent care. Hearing back from our patients is one way we can continue to improve our services. Please take a few minutes to complete the written survey that you may receive in the mail after your visit with us. Thank you!             Your Updated Medication List - Protect others around you: Learn how to safely use, store and throw away your medicines at www.disposemymeds.org.          This list is accurate as of 9/27/18  9:33 AM.  Always use your most recent med list.                   Brand Name Dispense Instructions for use Diagnosis    acyclovir 400 MG tablet    ZOVIRAX    30 tablet    Take 1 tablet (400 mg) by mouth 2 times daily For 3 days with each outbreak.    Herpes simplex vulvovaginitis       albuterol 108 (90 Base) MCG/ACT inhaler    PROAIR HFA/PROVENTIL HFA/VENTOLIN HFA    1 Inhaler    Inhale 2 puffs into the lungs every 4 hours as needed for shortness of breath / dyspnea    Mild intermittent asthma without complication       budesonide-formoterol 160-4.5 MCG/ACT Inhaler    SYMBICORT    3 Inhaler    Inhale 2 puffs into the lungs 2 times daily    Mild intermittent asthma without complication        cetirizine 5 MG Chew    zyrTEC     Take 5 mg by mouth daily        fluorouracil 5 % cream    EFUDEX    40 g    Apply topically 2 times daily    AK (actinic keratosis)       IBU PO      Take  by mouth.        meloxicam 7.5 MG tablet    MOBIC    90 tablet    Take 1 tablet (7.5 mg) by mouth daily    SI (sacroiliac) joint dysfunction       VITAMIN D3 PO      Take by mouth daily

## 2018-09-27 NOTE — PROGRESS NOTES
"Sports Medicine Clinic Visit    PCP: Lisbeth Ruiz    Valoriemaya Hahn is a 62 year old female who is seen  as self referral presenting with right knee pain.     Injury: None. Had this same issue 2 years ago, that resolved itself. She complains of intermittent knee pain, that isn't set off by anything specific. She states that her knee is locking as well.   Xrays right knee 2015 negative for degenerative changes.    Location of Pain: right knee  Duration of Pain: 4 day(s)  Pain is better with: Nothing  Pain is worse with: Pain is intermittent  Additional Features:   Treatment so far consists of: Nothing  Prior History of related problems:     /77  Pulse 76  Ht 5' 4\" (1.626 m)  Wt 180 lb (81.6 kg)  LMP 02/14/2010  BMI 30.9 kg/m2         PMH:  Past Medical History:   Diagnosis Date     Arthritis 2012    SI joint, osteoarthritis     Esophageal reflux      Genital herpes, unspecified      Mild intermittent asthma with exacerbation        Active problem list:  Patient Active Problem List   Diagnosis     Herpes simplex vulvovaginitis     Mild intermittent asthma without complication     CARDIOVASCULAR SCREENING; LDL GOAL LESS THAN 160     Family history of thyroid disease     Advanced directives, counseling/discussion     SI (sacroiliac) joint dysfunction     Osteitis, condensans     Sacroiliac joint disease     SI (sacroiliac) pain     Colon polyps     Chronic low back pain     Left hip pain     Dyspnea     Pain in joint involving ankle and foot, right       FH:  Family History   Problem Relation Age of Onset     Respiratory Mother      emphysema     Mental Illness Mother      Thyroid Disease Mother      Thyroid Disease Mother      hypothyroid, takes supplement     Cerebrovascular Disease Mother      Depression Mother      Mental Illness Mother      Glaucoma Mother      Mental Illness Maternal Grandmother      Depression Maternal Grandmother      Diabetes Father      type 2     HEART DISEASE " Father      bypass surgeries x 2     Other Cancer Father      non hodgkins     Skin Cancer Father      Thyroid Disease Sister      hypothyroid     Thyroid Disease Sister      hypothyroid     Psychotic Disorder Sister      commited suicide, depression     Cancer Brother      esophogeal cancer, work exposure to chemicals     Blood Disease Brother       hiv  aids     Blood Disease Brother      hep c     Blood Disease Brother      hiv positive, passed away     Family History Negative Brother      Diabetes Brother      Mental Illness Sister      Thyroid Disease Sister      Thyroid Disease Sister      Diabetes Brother      type 2     Depression Sister      Depression Sister      Depression Brother      Melanoma No family hx of      Macular Degeneration No family hx of        SH:  Social History     Social History     Marital status: Single     Spouse name: N/A     Number of children: 0     Years of education: N/A     Occupational History      Self     Social History Main Topics     Smoking status: Former Smoker     Packs/day: 0.50     Years: 10.00     Types: Cigarettes     Start date: 1990     Quit date: 2000     Smokeless tobacco: Never Used     Alcohol use Yes      Comment: 1-2 glasses of wine per night     Drug use: No     Sexual activity: Not Currently     Partners: Female     Other Topics Concern      Service No     Blood Transfusions Yes     Caffeine Concern No     Occupational Exposure No     Hobby Hazards No     Sleep Concern No     Stress Concern No     Weight Concern Yes     gained 30 pounds in last 3 to 4 years     Special Diet No     Back Care Yes     pain in recent years     Exercise Yes     gym and bike riding     Bike Helmet Yes     Seat Belt Yes     Self-Exams Yes     Parent/Sibling W/ Cabg, Mi Or Angioplasty Before 65f 55m? No     Father, aged late 40's     Social History Narrative    Balanced Diet - Yes    Osteoporosis Prevention Measures - Dairy servings per day: 2  servings    Regular Exercise -  Yes Describe walks daily for 1 hour    Dental Exam - YES - Date: 6 months ago    Eye Exam - YES - Date: 5-09    Self Breast Exam - No    Abuse: Current or Past (Physical, Sexual or Emotional)- No    Do you feel safe in your environment - Yes    Guns stored in the home - No    Sunscreen used - Yes    Seatbelts used - Yes    Lipids -  YES - Date: 5-05    Glucose -  YES - Date: 8-05    Colon Cancer Screening - Colonoscopy 12-07(date completed)    Hemoccults -     Pap Test -  YES - Date: 6-08    Do you have any concerns about STD's -  No    Mammography - YES - Date: 9-08    DEXA - NO    Immunizations reviewed and up to date - Yes, td 7-05 6-11-99 BETTE Rodriguez [facility]               MEDS:  See EMR, reviewed  ALL:  See EMR, reviewed    REVIEW OF SYSTEMS:  CONSTITUTIONAL:NEGATIVE for fever, chills, change in weight  INTEGUMENTARY/SKIN: NEGATIVE for worrisome rashes, moles or lesions  EYES: NEGATIVE for vision changes or irritation  ENT/MOUTH: NEGATIVE for ear, mouth and throat problems  RESP:NEGATIVE for significant cough or SOB  BREAST: NEGATIVE for masses, tenderness or discharge  CV: NEGATIVE for chest pain, palpitations or peripheral edema  GI: NEGATIVE for nausea, abdominal pain, heartburn, or change in bowel habits  :NEGATIVE for frequency, dysuria, or hematuria  :NEGATIVE for frequency, dysuria, or hematuria  NEURO: NEGATIVE for weakness, dizziness or paresthesias  ENDOCRINE: NEGATIVE for temperature intolerance, skin/hair changes  HEME/ALLERGY/IMMUNE: NEGATIVE for bleeding problems  PSYCHIATRIC: NEGATIVE for changes in mood or affect      Subjective: This 62-year-old female at the suggestion of her physical therapist has recently been doing some Achilles stretches and calf raises on leg press machine with her leg placed at the bottom of the platform.  She then proceeds to do a squat in this position with the leg press machine.  Lately she has been noticing intermittent sharp  discomfort that would last for a few moments involving the right knee.  Will be a sharp pain will make it difficult for her to walk.  She will have trouble bending and straightening the knee and will shake the need to bend it.  Then the pain seems to improve.  She had a similar problem in 2015 that improved.  She has had no swelling of the knee.    Objective: The right knee reveals no effusion.  She does seem to have tenderness over the medial joint line at various points during the exam.  Can extend the knee fully and can flex 100 degrees.  She is nontender over the lateral joint line.  Nontender over the patellar tendon or pes anserine bursa.  Anterior posterior drawer is negative.  Lachman's is negative.  Normal range of motion at the right hip.  Nontender over the peds anserine bursa or distal IT band.  Overlying skin is normal.  Appropriate in conversation and affect.    An x-ray of the right knee shows no bony abnormality and no significant degenerative change.  Is no significant DJD in the patellofemoral space.    Assessment intermittent right sided knee discomfort suspect patellofemoral discomfort versus the possibility of a degenerative meniscal tear.    Plan: We discussed alterations that she could do in her workout program to make sure that it safe for the kneecap.  She will focus on hamstring calf and heel cord stretches.  She has over-the-counter pain medicines available.  If she is not making improvements over the next 2 weeks and has persistent mechanical symptoms she knows an MRI could be considered to rule out a significant sized degenerative meniscal tear.

## 2018-09-27 NOTE — LETTER
"  9/27/2018      RE: Valorie Hahn  [address]  [facility] [address]       Sports Medicine Clinic Visit    PCP: Lisbeth Ruiz    Valorie Hahn is a 62 year old female who is seen  as self referral presenting with right knee pain.     Injury: None. Had this same issue 2 years ago, that resolved itself. She complains of intermittent knee pain, that isn't set off by anything specific. She states that her knee is locking as well.   Xrays right knee 2015 negative for degenerative changes.    Location of Pain: right knee  Duration of Pain: 4 day(s)  Pain is better with: Nothing  Pain is worse with: Pain is intermittent  Additional Features:   Treatment so far consists of: Nothing  Prior History of related problems:     /77  Pulse 76  Ht 5' 4\" (1.626 m)  Wt 180 lb (81.6 kg)  LMP 02/14/2010  BMI 30.9 kg/m2         PMH:  Past Medical History:   Diagnosis Date     Arthritis 2012    SI joint, osteoarthritis     Esophageal reflux      Genital herpes, unspecified      Mild intermittent asthma with exacerbation        Active problem list:  Patient Active Problem List   Diagnosis     Herpes simplex vulvovaginitis     Mild intermittent asthma without complication     CARDIOVASCULAR SCREENING; LDL GOAL LESS THAN 160     Family history of thyroid disease     Advanced directives, counseling/discussion     SI (sacroiliac) joint dysfunction     Osteitis, condensans     Sacroiliac joint disease     SI (sacroiliac) pain     Colon polyps     Chronic low back pain     Left hip pain     Dyspnea     Pain in joint involving ankle and foot, right       FH:  Family History   Problem Relation Age of Onset     Respiratory Mother      emphysema     Mental Illness Mother      Thyroid Disease Mother      Thyroid Disease Mother      hypothyroid, takes supplement     Cerebrovascular Disease Mother      Depression Mother      Mental Illness Mother      Glaucoma Mother      Mental Illness Maternal " Grandmother      Depression Maternal Grandmother      Diabetes Father      type 2     HEART DISEASE Father      bypass surgeries x 2     Other Cancer Father      non hodgkins     Skin Cancer Father      Thyroid Disease Sister      hypothyroid     Thyroid Disease Sister      hypothyroid     Psychotic Disorder Sister      commited suicide, depression     Cancer Brother      esophogeal cancer, work exposure to chemicals     Blood Disease Brother       hiv  aids     Blood Disease Brother      hep c     Blood Disease Brother      hiv positive, passed away     Family History Negative Brother      Diabetes Brother      Mental Illness Sister      Thyroid Disease Sister      Thyroid Disease Sister      Diabetes Brother      type 2     Depression Sister      Depression Sister      Depression Brother      Melanoma No family hx of      Macular Degeneration No family hx of        SH:  Social History     Social History     Marital status: Single     Spouse name: N/A     Number of children: 0     Years of education: N/A     Occupational History      Self     Social History Main Topics     Smoking status: Former Smoker     Packs/day: 0.50     Years: 10.00     Types: Cigarettes     Start date: 1990     Quit date: 2000     Smokeless tobacco: Never Used     Alcohol use Yes      Comment: 1-2 glasses of wine per night     Drug use: No     Sexual activity: Not Currently     Partners: Female     Other Topics Concern      Service No     Blood Transfusions Yes     Caffeine Concern No     Occupational Exposure No     Hobby Hazards No     Sleep Concern No     Stress Concern No     Weight Concern Yes     gained 30 pounds in last 3 to 4 years     Special Diet No     Back Care Yes     pain in recent years     Exercise Yes     gym and bike riding     Bike Helmet Yes     Seat Belt Yes     Self-Exams Yes     Parent/Sibling W/ Cabg, Mi Or Angioplasty Before 65f 55m? No     Father, aged late 40's     Social  History Narrative    Balanced Diet - Yes    Osteoporosis Prevention Measures - Dairy servings per day: 2 servings    Regular Exercise -  Yes Describe walks daily for 1 hour    Dental Exam - YES - Date: 6 months ago    Eye Exam - YES - Date: 5-09    Self Breast Exam - No    Abuse: Current or Past (Physical, Sexual or Emotional)- No    Do you feel safe in your environment - Yes    Guns stored in the home - No    Sunscreen used - Yes    Seatbelts used - Yes    Lipids -  YES - Date: 5-05    Glucose -  YES - Date: 8-05    Colon Cancer Screening - Colonoscopy 12-07(date completed)    Hemoccults -     Pap Test -  YES - Date: 6-08    Do you have any concerns about STD's -  No    Mammography - YES - Date: 9-08    DEXA - NO    Immunizations reviewed and up to date - Yes, td 7-05 6-11-99 [facility]TE Rodriguez [facility]               MEDS:  See EMR, reviewed  ALL:  See EMR, reviewed    REVIEW OF SYSTEMS:  CONSTITUTIONAL:NEGATIVE for fever, chills, change in weight  INTEGUMENTARY/SKIN: NEGATIVE for worrisome rashes, moles or lesions  EYES: NEGATIVE for vision changes or irritation  ENT/MOUTH: NEGATIVE for ear, mouth and throat problems  RESP:NEGATIVE for significant cough or SOB  BREAST: NEGATIVE for masses, tenderness or discharge  CV: NEGATIVE for chest pain, palpitations or peripheral edema  GI: NEGATIVE for nausea, abdominal pain, heartburn, or change in bowel habits  :NEGATIVE for frequency, dysuria, or hematuria  :NEGATIVE for frequency, dysuria, or hematuria  NEURO: NEGATIVE for weakness, dizziness or paresthesias  ENDOCRINE: NEGATIVE for temperature intolerance, skin/hair changes  HEME/ALLERGY/IMMUNE: NEGATIVE for bleeding problems  PSYCHIATRIC: NEGATIVE for changes in mood or affect      Subjective: This 62-year-old female at the suggestion of her physical therapist has recently been doing some Achilles stretches and calf raises on leg press machine with her leg placed at the bottom of the platform.  She then proceeds to do a  squat in this position with the leg press machine.  Lately she has been noticing intermittent sharp discomfort that would last for a few moments involving the right knee.  Will be a sharp pain will make it difficult for her to walk.  She will have trouble bending and straightening the knee and will shake the need to bend it.  Then the pain seems to improve.  She had a similar problem in 2015 that improved.  She has had no swelling of the knee.    Objective: The right knee reveals no effusion.  She does seem to have tenderness over the medial joint line at various points during the exam.  Can extend the knee fully and can flex 100 degrees.  She is nontender over the lateral joint line.  Nontender over the patellar tendon or pes anserine bursa.  Anterior posterior drawer is negative.  Lachman's is negative.  Normal range of motion at the right hip.  Nontender over the peds anserine bursa or distal IT band.  Overlying skin is normal.  Appropriate in conversation and affect.    An x-ray of the right knee shows no bony abnormality and no significant degenerative change.  Is no significant DJD in the patellofemoral space.    Assessment intermittent right sided knee discomfort suspect patellofemoral discomfort versus the possibility of a degenerative meniscal tear.    Plan: We discussed alterations that she could do in her workout program to make sure that it safe for the kneecap.  She will focus on hamstring calf and heel cord stretches.  She has over-the-counter pain medicines available.  If she is not making improvements over the next 2 weeks and has persistent mechanical symptoms she knows an MRI could be considered to rule out a significant sized degenerative meniscal tear.     Grant Canada MD

## 2018-12-12 ENCOUNTER — OFFICE VISIT (OUTPATIENT)
Dept: OPHTHALMOLOGY | Facility: CLINIC | Age: 63
End: 2018-12-12
Attending: OPHTHALMOLOGY
Payer: COMMERCIAL

## 2018-12-12 DIAGNOSIS — H52.13 MYOPIC ASTIGMATISM OF BOTH EYES: ICD-10-CM

## 2018-12-12 DIAGNOSIS — H52.203 MYOPIC ASTIGMATISM OF BOTH EYES: ICD-10-CM

## 2018-12-12 DIAGNOSIS — H52.4 PRESBYOPIA: ICD-10-CM

## 2018-12-12 DIAGNOSIS — H25.13 NUCLEAR SCLEROTIC CATARACT OF BOTH EYES: Primary | ICD-10-CM

## 2018-12-12 PROCEDURE — G0463 HOSPITAL OUTPT CLINIC VISIT: HCPCS | Mod: ZF

## 2018-12-12 PROCEDURE — 76516 ECHO EXAM OF EYE: CPT | Mod: ZF | Performed by: OPHTHALMOLOGY

## 2018-12-12 ASSESSMENT — VISUAL ACUITY
OD_BAT_LOW: 20/40-1
OD_CC+: +1
OS_CC+: +1
OD_PH_CC+: -2
OD_BAT_HIGH: 20/40
OD_CC: 20/40
OS_BAT_HIGH: 20/30
OS_CC: 20/40
OD_BAT_MED: 20/40-1
OS_BAT_LOW: 20/30-2
OS_BAT_MED: 20/30-1
OD_PH_CC: 20/20
METHOD: SNELLEN - LINEAR

## 2018-12-12 ASSESSMENT — CUP TO DISC RATIO
OD_RATIO: 0.25
OS_RATIO: 0.25

## 2018-12-12 ASSESSMENT — TONOMETRY
OS_IOP_MMHG: 12
OD_IOP_MMHG: 14
IOP_METHOD: TONOPEN

## 2018-12-12 ASSESSMENT — REFRACTION_WEARINGRX
OD_CYLINDER: +1.50
OS_AXIS: 138
OS_SPHERE: -4.00
SPECS_TYPE: PAL
OS_ADD: +2.50
OD_ADD: +2.50
OD_AXIS: 010
OS_CYLINDER: +1.75
OD_SPHERE: -5.75

## 2018-12-12 ASSESSMENT — CONF VISUAL FIELD
METHOD: COUNTING FINGERS
OD_NORMAL: 1
OS_NORMAL: 1

## 2018-12-12 ASSESSMENT — SLIT LAMP EXAM - LIDS
COMMENTS: NORMAL
COMMENTS: NORMAL

## 2018-12-12 NOTE — PROGRESS NOTES
HPI  Valorie Hahn is a 63 year old female referred by Dr. Ku for cataract evaluation. She has noted a gradual blurring of her vision in both eyes at near and distance over the last year to year and a half. She also endorses worsening glare with headlights at night which limits her ability to drive at night. No pain, redness, discharge. No flashes/floaters.    POH: No history of eye surgery or trauma.  PMH: Intermittent asthma  FH: Brother with strabismus and amblyopia s/p strabismus surgery as a child.      Assessment & Plan      (H25.13) Nuclear sclerotic cataract of both eyes  (primary encounter diagnosis)  Comment: Visually significant with BCVA of 20/40 right eye and left eye and dense NS and PSC on exam.    Dilates to: 8 mm  Alpha blockers/Flomax: None  Trauma/Pseudoxfoliation: None  Fuchs dystrophy/guttae: None    Diabetes: No  Anticoagulation: None    Cyl: 0.12 @ 091 right eye, 0.42 @ 164 left eye    We discussed the risks and benefits of cataract surgery, and informed consent was obtained.  Proceed with CE/IOL left eye followed by right eye.    Surgical plan:  Topical  Aim emmetropia left eye (plan for mini-mono right eye, aim -1.50)    (H52.203,  H52.13) Myopic astigmatism of both eyes  (H52.4) Presbyopia  Comment: Vision limited by cataract  Plan: Hold off on new glasses Rx until after CE/IOL     -----------------------------------------------------------------------------------    Patient disposition:   Return for scheduled procedure. or sooner as needed.    Teaching statement:  Complete documentation of historical and exam elements from today's encounter can be found in the full encounter summary report (not reduplicated in this progress note). I personally obtained the chief complaint(s) and history of present illness.  I confirmed and edited as necessary the review of systems, past medical/surgical history, family history, social history, and examination findings as documented by others;  and I examined the patient myself. I personally reviewed the relevant tests, images, and reports as documented above.     I formulated and edited as necessary the assessment and plan and discussed the findings and management plan with the patient and family.    Danna Garcia MD  Comprehensive Ophthalmology & Ocular Pathology  Department of Ophthalmology and Visual Neurosciences  [email or web address]  Pager [phone]

## 2018-12-27 ENCOUNTER — TELEPHONE (OUTPATIENT)
Dept: GASTROENTEROLOGY | Facility: CLINIC | Age: 63
End: 2018-12-27

## 2018-12-27 NOTE — TELEPHONE ENCOUNTER
Additional Information regarding appointment:  Patient requested a call back for scheduled colonoscopy on Friday December 28th in the AM  Patient scheduled for:  Colonoscopy   Indication for procedure: screening  Date/Arrival time: Thursday January 3, @9:30 am  Procedure Provider:  Dr. Adler  Referring Provider. Lisbeth Ruiz  Prep Type:   [x]Golytely eRx: (not sent)  []NPO /p MN, No solid food /p 2200 the night before  Facility location:    [][address], [address]st Floor, [address]  [x][address] [facility], 5th floor   Anticoagulants or blood thinners: no

## 2018-12-28 ENCOUNTER — TELEPHONE (OUTPATIENT)
Dept: GASTROENTEROLOGY | Facility: CLINIC | Age: 63
End: 2018-12-28

## 2018-12-28 DIAGNOSIS — Z12.11 SPECIAL SCREENING FOR MALIGNANT NEOPLASMS, COLON: Primary | ICD-10-CM

## 2018-12-28 NOTE — TELEPHONE ENCOUNTER
Patient scheduled for colonoscopy    Indication for procedure. screening    Referring Provider. Lisbeth ALEXANDER    ? no    Arrival time verified? 9:30 am    Facility location verified? [address] [place] [address]   5th floor    Instructions given regarding prep and procedure    Prep Type Golytely, e Script to [facility]    Are you taking any anticoagulants or blood thinners? no    Instructions given? Yes written and verbal    Electronic implanted devices? no    Pre procedure teaching completed? Yes    Transportation from procedure? yes    H&P / Pre op physical completed? no

## 2019-01-03 ENCOUNTER — TELEPHONE (OUTPATIENT)
Dept: OPHTHALMOLOGY | Facility: CLINIC | Age: 64
End: 2019-01-03

## 2019-01-03 ENCOUNTER — HOSPITAL ENCOUNTER (OUTPATIENT)
Facility: AMBULATORY SURGERY CENTER | Age: 64
End: 2019-01-03
Attending: INTERNAL MEDICINE
Payer: COMMERCIAL

## 2019-01-03 VITALS
RESPIRATION RATE: 20 BRPM | HEIGHT: 64 IN | WEIGHT: 180 LBS | HEART RATE: 54 BPM | OXYGEN SATURATION: 96 % | DIASTOLIC BLOOD PRESSURE: 64 MMHG | TEMPERATURE: 98.4 F | SYSTOLIC BLOOD PRESSURE: 100 MMHG | BODY MASS INDEX: 30.73 KG/M2

## 2019-01-03 DIAGNOSIS — Z12.11 COLON CANCER SCREENING: Primary | ICD-10-CM

## 2019-01-03 LAB — COLONOSCOPY: NORMAL

## 2019-01-03 RX ORDER — LIDOCAINE 40 MG/G
CREAM TOPICAL
Status: DISCONTINUED | OUTPATIENT
Start: 2019-01-03 | End: 2019-01-04 | Stop reason: HOSPADM

## 2019-01-03 RX ORDER — ONDANSETRON 2 MG/ML
4 INJECTION INTRAMUSCULAR; INTRAVENOUS
Status: COMPLETED | OUTPATIENT
Start: 2019-01-03 | End: 2019-01-03

## 2019-01-03 RX ORDER — FENTANYL CITRATE 50 UG/ML
INJECTION, SOLUTION INTRAMUSCULAR; INTRAVENOUS PRN
Status: DISCONTINUED | OUTPATIENT
Start: 2019-01-03 | End: 2019-01-03 | Stop reason: HOSPADM

## 2019-01-03 RX ADMIN — ONDANSETRON 4 MG: 2 INJECTION INTRAMUSCULAR; INTRAVENOUS at 13:05

## 2019-01-03 ASSESSMENT — MIFFLIN-ST. JEOR: SCORE: 1353.53

## 2019-01-07 ENCOUNTER — ANESTHESIA EVENT (OUTPATIENT)
Dept: SURGERY | Facility: AMBULATORY SURGERY CENTER | Age: 64
End: 2019-01-07

## 2019-02-05 ENCOUNTER — OFFICE VISIT (OUTPATIENT)
Dept: FAMILY MEDICINE | Facility: CLINIC | Age: 64
End: 2019-02-05
Payer: COMMERCIAL

## 2019-02-05 DIAGNOSIS — Z53.9 ERRONEOUS ENCOUNTER--DISREGARD: Primary | ICD-10-CM

## 2019-02-13 ENCOUNTER — OFFICE VISIT (OUTPATIENT)
Dept: FAMILY MEDICINE | Facility: CLINIC | Age: 64
End: 2019-02-13
Payer: COMMERCIAL

## 2019-02-13 VITALS
OXYGEN SATURATION: 97 % | HEIGHT: 64 IN | TEMPERATURE: 98.5 F | DIASTOLIC BLOOD PRESSURE: 79 MMHG | SYSTOLIC BLOOD PRESSURE: 122 MMHG | RESPIRATION RATE: 20 BRPM | BODY MASS INDEX: 31.41 KG/M2 | WEIGHT: 184 LBS | HEART RATE: 62 BPM

## 2019-02-13 DIAGNOSIS — J45.20 MILD INTERMITTENT ASTHMA WITHOUT COMPLICATION: ICD-10-CM

## 2019-02-13 DIAGNOSIS — Z01.818 PREOP GENERAL PHYSICAL EXAM: Primary | ICD-10-CM

## 2019-02-13 DIAGNOSIS — H26.9 CATARACT OF BOTH EYES, UNSPECIFIED CATARACT TYPE: ICD-10-CM

## 2019-02-13 PROCEDURE — 99214 OFFICE O/P EST MOD 30 MIN: CPT | Performed by: NURSE PRACTITIONER

## 2019-02-13 ASSESSMENT — ANXIETY QUESTIONNAIRES
6. BECOMING EASILY ANNOYED OR IRRITABLE: NOT AT ALL
7. FEELING AFRAID AS IF SOMETHING AWFUL MIGHT HAPPEN: NOT AT ALL
3. WORRYING TOO MUCH ABOUT DIFFERENT THINGS: NOT AT ALL
5. BEING SO RESTLESS THAT IT IS HARD TO SIT STILL: NOT AT ALL
1. FEELING NERVOUS, ANXIOUS, OR ON EDGE: NOT AT ALL

## 2019-02-13 ASSESSMENT — PATIENT HEALTH QUESTIONNAIRE - PHQ9
SUM OF ALL RESPONSES TO PHQ QUESTIONS 1-9: 0
5. POOR APPETITE OR OVEREATING: NOT AT ALL

## 2019-02-13 ASSESSMENT — MIFFLIN-ST. JEOR: SCORE: 1371.68

## 2019-02-13 NOTE — PROGRESS NOTES
[address] [address]  [phone]  Dept: [phone]    PRE-OP EVALUATION:  Today's date: 2019    Valorie Hahn (: 1955) presents for pre-operative evaluation assessment as requested by Dr. Gallegos.  She requires evaluation and anesthesia risk assessment prior to undergoing surgery/procedure for treatment of bilateral cataracts.    Proposed Surgery/ Procedure: catracts  Date of Surgery/ Procedure: 2019 and 2019  Time of Surgery/ Procedure: [facility]  Hospital/Surgical Facility: unit(s)[facility] surgery center   Electronically available  Primary Physician: Lisbeth Ruiz  Type of Anesthesia Anticipated: to be determined    Patient has a Health Care Directive or Living Will:  YES    1. NO - Do you have a history of heart attack, stroke, stent, bypass or surgery on an artery in the head, neck, heart or legs?  2. NO - Do you ever have any pain or discomfort in your chest?  3. NO - Do you have a history of  Heart Failure?  4. NO - Are you troubled by shortness of breath when: walking on the level, up a slight hill or at night?  5. NO - Do you currently have a cold, bronchitis or other respiratory infection?  6. YES - DO YOU HAVE A COUGH, SHORTNESS OF BREATH OR WHEEZING? Cold weather asthma   7. NO - Do you sometimes get pains in the calves of your legs when you walk?  8. NO - Do you or anyone in your family have previous history of blood clots?  9. NO - Do you or does anyone in your family have a serious bleeding problem such as prolonged bleeding following surgeries or cuts?  10. NO - Have you ever had problems with anemia or been told to take iron pills?  11. NO - Have you had any abnormal blood loss such as black, tarry or bloody stools, or abnormal vaginal bleeding?  12. YES - HAVE YOU EVER HAD A BLOOD TRANSFUSION? At 13 years old   13. NO - Have you or any of your relatives ever had problems with anesthesia?  14. NO - Do you have  sleep apnea, excessive snoring or daytime drowsiness?  15. NO - Do you have any prosthetic heart valves?  16. NO - Do you have prosthetic joints?  17. NO - Is there any chance that you may be pregnant?      HPI:     HPI related to upcoming procedure:   Valorie has a history of worsening vision, both eyes.  She is now scheduled for cataract surgery.    Asthma:  Overall very well controlled.  She currently has c/o an intermittent cough related to her asthma/cold weather with no other URi symptoms.       MEDICAL HISTORY:     Patient Active Problem List    Diagnosis Date Noted     Cataract of both eyes, unspecified cataract type 02/13/2019     Priority: Medium     Pain in joint involving ankle and foot, right 01/25/2018     Priority: Medium     Dyspnea 06/15/2015     Priority: Medium     Chronic low back pain 05/31/2013     Priority: Medium     Left hip pain 05/31/2013     Priority: Medium     Colon polyps 05/14/2013     Priority: Medium     SI (sacroiliac) pain 10/23/2012     Priority: Medium     Osteitis, condensans 09/26/2012     Priority: Medium     Sacroiliac joint disease 09/26/2012     Priority: Medium     SI (sacroiliac) joint dysfunction 08/03/2012     Priority: Medium     Advanced directives, counseling/discussion 04/27/2012     Priority: Medium     Family history of thyroid disease 07/07/2010     Priority: Medium     CARDIOVASCULAR SCREENING; LDL GOAL LESS THAN 160 05/09/2010     Priority: Medium     Herpes simplex vulvovaginitis 05/25/2004     Priority: Medium     Problem list name updated by automated process. Provider to review       Mild intermittent asthma without complication 05/25/2004     Priority: Medium     Problem list name updated by automated process. Provider to review        Past Medical History:   Diagnosis Date     Arthritis 2012    SI joint, osteoarthritis     Esophageal reflux      Genital herpes, unspecified      Mild intermittent asthma with exacerbation      Past Surgical History:    Procedure Laterality Date     C APPENDECTOMY       C NONSPECIFIC PROCEDURE      Anal fissure repair     COLONOSCOPY      some polyps removed     COLONOSCOPY WITH CO2 INSUFFLATION N/A 1/3/2019    Procedure: COLONOSCOPY WITH CO2 INSUFFLATION;  Surgeon: Arnold Adler MD;  Location:  OR      TOOTH EXTRACTION W/FORCEP       Current Outpatient Medications   Medication Sig Dispense Refill     acyclovir (ZOVIRAX) 400 MG tablet Take 1 tablet (400 mg) by mouth 2 times daily For 3 days with each outbreak. 30 tablet 3     albuterol (PROAIR HFA/PROVENTIL HFA/VENTOLIN HFA) 108 (90 BASE) MCG/ACT Inhaler Inhale 2 puffs into the lungs every 4 hours as needed for shortness of breath / dyspnea 1 Inhaler 11     budesonide-formoterol (SYMBICORT) 160-4.5 MCG/ACT Inhaler Inhale 2 puffs into the lungs 2 times daily 3 Inhaler 1     cetirizine (ZYRTEC) 5 MG CHEW Take 5 mg by mouth daily       Cholecalciferol (VITAMIN D3 PO) Take by mouth daily       fluorouracil (EFUDEX) 5 % cream Apply topically 2 times daily 40 g 0     Ibuprofen (IBU PO) Take  by mouth.       meloxicam (MOBIC) 7.5 MG tablet Take 1 tablet (7.5 mg) by mouth daily 30 tablet 5     OTC products: None, except as noted above    Allergies   Allergen Reactions     Penicillin G Hives      Latex Allergy: NO    Social History     Tobacco Use     Smoking status: Former Smoker     Packs/day: 0.50     Years: 10.00     Pack years: 5.00     Types: Cigarettes     Start date: 1990     Last attempt to quit: 2000     Years since quittin.7     Smokeless tobacco: Never Used   Substance Use Topics     Alcohol use: Yes     Comment: 1-2 glasses of wine per night     History   Drug Use No       REVIEW OF SYSTEMS:   CONSTITUTIONAL: NEGATIVE for fever, chills, change in weight  INTEGUMENTARY/SKIN: NEGATIVE for worrisome rashes, moles or lesions  EYES: NEGATIVE for vision changes or irritation  ENT/MOUTH: NEGATIVE for ear, mouth and throat problems  RESP: NEGATIVE  "for significant cough or SOB  BREAST: NEGATIVE for masses, tenderness or discharge  CV: NEGATIVE for chest pain, palpitations or peripheral edema  GI: NEGATIVE for nausea, abdominal pain, heartburn, or change in bowel habits  : NEGATIVE for frequency, dysuria, or hematuria  MUSCULOSKELETAL: NEGATIVE for significant arthralgias or myalgia  NEURO: NEGATIVE for weakness, dizziness or paresthesias  ENDOCRINE: NEGATIVE for temperature intolerance, skin/hair changes  HEME: NEGATIVE for bleeding problems  PSYCHIATRIC: NEGATIVE for changes in mood or affect    EXAM:   /79   Pulse 62   Temp 98.5  F (36.9  C) (Oral)   Resp 20   Ht 1.613 m (5' 3.5\")   Wt 83.5 kg (184 lb)   LMP 02/14/2010   SpO2 97%   BMI 32.08 kg/m      GENERAL APPEARANCE: healthy, alert and no distress     EYES: EOMI, PERRL     HENT: ear canals and TM's normal and nose and mouth without ulcers or lesions     NECK: no adenopathy, no asymmetry, masses, or scars and thyroid normal to palpation     RESP: lungs clear to auscultation - no rales, rhonchi or wheezes     CV: regular rates and rhythm, normal S1 S2, no S3 or S4 and no murmur, click or rub     ABDOMEN:  soft, nontender, no HSM or masses and bowel sounds normal     MS: extremities normal- no gross deformities noted, no evidence of inflammation in joints, FROM in all extremities.     SKIN: no suspicious lesions or rashes     NEURO: Normal strength and tone, sensory exam grossly normal, mentation intact and speech normal     PSYCH: mentation appears normal. and affect normal/bright     LYMPHATICS: No cervical adenopathy    DIAGNOSTICS:   No labs or EKG required for low risk surgery (cataract, skin procedure, breast biopsy, etc)    Recent Labs   Lab Test 08/02/17  1502 08/10/16  1106 06/16/15  1010   HGB 13.8 14.4 13.8     --   --      --  142   POTASSIUM 3.9  --  4.2   CR 0.60  --  0.67        IMPRESSION:     (Z01.818) Preop general physical exam  (primary encounter " diagnosis)  Comment:   Plan: cleared for surgery.    (H26.9) Cataract of both eyes, unspecified cataract type  Comment:   Plan: cleared for surgery    (J45.20) Mild intermittent asthma without complication  Comment: well controlled  Plan:       The proposed surgical procedure is considered LOW risk.    REVISED CARDIAC RISK INDEX  The patient has the following serious cardiovascular risks for perioperative complications such as (MI, PE, VFib and 3  AV Block):  No serious cardiac risks  INTERPRETATION: 0 risks: Class I (very low risk - 0.4% complication rate)    The patient has the following additional risks for perioperative complications:  No identified additional risks      ICD-10-CM    1. Preop general physical exam Z01.818    2. Cataract of both eyes, unspecified cataract type H26.9    3. Mild intermittent asthma without complication J45.20        RECOMMENDATIONS:       Pulmonary Risk  Incentive spirometry post op  Respiratory Therapy (Respiratory Care IP Consult)  post op  NG tube decompression if abdominal distension or significant vomiting       --Patient is to hold her MOBIC for 5-7 days before surgery.     APPROVAL GIVEN to proceed with proposed procedure, without further diagnostic evaluation       Signed Electronically by: BC Cuevas CNP    Copy of this evaluation report is provided to requesting physician.    [facility] Preop Guidelines    Revised Cardiac Risk Index

## 2019-02-14 ASSESSMENT — ASTHMA QUESTIONNAIRES: ACT_TOTALSCORE: 21

## 2019-02-27 DIAGNOSIS — H25.12 NUCLEAR SCLEROTIC CATARACT, LEFT: Primary | ICD-10-CM

## 2019-02-27 RX ORDER — PREDNISOLONE ACETATE 10 MG/ML
SUSPENSION/ DROPS OPHTHALMIC
Qty: 1 BOTTLE | Refills: 1 | Status: SHIPPED | OUTPATIENT
Start: 2019-02-27 | End: 2019-04-15

## 2019-02-27 RX ORDER — OFLOXACIN 3 MG/ML
SOLUTION/ DROPS OPHTHALMIC
Qty: 1 BOTTLE | Refills: 0 | Status: SHIPPED | OUTPATIENT
Start: 2019-02-27 | End: 2019-04-15

## 2019-02-28 ENCOUNTER — ANESTHESIA EVENT (OUTPATIENT)
Dept: SURGERY | Facility: AMBULATORY SURGERY CENTER | Age: 64
End: 2019-02-28

## 2019-03-01 ENCOUNTER — OFFICE VISIT (OUTPATIENT)
Dept: OPHTHALMOLOGY | Facility: CLINIC | Age: 64
End: 2019-03-01
Payer: COMMERCIAL

## 2019-03-01 ENCOUNTER — TELEPHONE (OUTPATIENT)
Dept: INTERNAL MEDICINE | Facility: CLINIC | Age: 64
End: 2019-03-01

## 2019-03-01 ENCOUNTER — HOSPITAL ENCOUNTER (OUTPATIENT)
Facility: AMBULATORY SURGERY CENTER | Age: 64
End: 2019-03-01
Attending: OPHTHALMOLOGY
Payer: COMMERCIAL

## 2019-03-01 ENCOUNTER — ANESTHESIA (OUTPATIENT)
Dept: SURGERY | Facility: AMBULATORY SURGERY CENTER | Age: 64
End: 2019-03-01

## 2019-03-01 ENCOUNTER — OFFICE VISIT (OUTPATIENT)
Dept: INTERNAL MEDICINE | Facility: CLINIC | Age: 64
End: 2019-03-01
Payer: COMMERCIAL

## 2019-03-01 VITALS
OXYGEN SATURATION: 97 % | BODY MASS INDEX: 30.73 KG/M2 | TEMPERATURE: 97.3 F | HEIGHT: 64 IN | RESPIRATION RATE: 16 BRPM | WEIGHT: 180 LBS | DIASTOLIC BLOOD PRESSURE: 63 MMHG | HEART RATE: 55 BPM | SYSTOLIC BLOOD PRESSURE: 110 MMHG

## 2019-03-01 VITALS
DIASTOLIC BLOOD PRESSURE: 70 MMHG | WEIGHT: 180 LBS | SYSTOLIC BLOOD PRESSURE: 107 MMHG | HEART RATE: 64 BPM | OXYGEN SATURATION: 94 % | BODY MASS INDEX: 31.39 KG/M2

## 2019-03-01 DIAGNOSIS — Z96.1 PSEUDOPHAKIA, LEFT EYE: Primary | ICD-10-CM

## 2019-03-01 DIAGNOSIS — L20.9 ATOPIC DERMATITIS, UNSPECIFIED TYPE: Primary | ICD-10-CM

## 2019-03-01 DIAGNOSIS — H25.12 NUCLEAR SCLEROTIC CATARACT, LEFT: Primary | ICD-10-CM

## 2019-03-01 DIAGNOSIS — Z98.890 POSTSURGICAL STATE, EYE: ICD-10-CM

## 2019-03-01 DEVICE — EYE IMP IOL AMO PCL TECNIS ZCB00 13.0: Type: IMPLANTABLE DEVICE | Site: EYE | Status: FUNCTIONAL

## 2019-03-01 RX ORDER — MOXIFLOXACIN IN NACL,ISO-OS/PF 0.3MG/0.3
SYRINGE (ML) INTRAOCULAR PRN
Status: DISCONTINUED | OUTPATIENT
Start: 2019-03-01 | End: 2019-03-01 | Stop reason: HOSPADM

## 2019-03-01 RX ORDER — ACETAMINOPHEN 325 MG/1
975 TABLET ORAL ONCE
Status: COMPLETED | OUTPATIENT
Start: 2019-03-01 | End: 2019-03-01

## 2019-03-01 RX ORDER — FENTANYL CITRATE 50 UG/ML
INJECTION, SOLUTION INTRAMUSCULAR; INTRAVENOUS PRN
Status: DISCONTINUED | OUTPATIENT
Start: 2019-03-01 | End: 2019-03-01

## 2019-03-01 RX ORDER — ONDANSETRON 2 MG/ML
INJECTION INTRAMUSCULAR; INTRAVENOUS PRN
Status: DISCONTINUED | OUTPATIENT
Start: 2019-03-01 | End: 2019-03-01

## 2019-03-01 RX ORDER — PROPARACAINE HYDROCHLORIDE 5 MG/ML
1 SOLUTION/ DROPS OPHTHALMIC ONCE
Status: COMPLETED | OUTPATIENT
Start: 2019-03-01 | End: 2019-03-01

## 2019-03-01 RX ORDER — FLURBIPROFEN SODIUM 0.3 MG/ML
1 SOLUTION/ DROPS OPHTHALMIC
Status: COMPLETED | OUTPATIENT
Start: 2019-03-01 | End: 2019-03-01

## 2019-03-01 RX ORDER — ONDANSETRON 2 MG/ML
4 INJECTION INTRAMUSCULAR; INTRAVENOUS EVERY 30 MIN PRN
Status: DISCONTINUED | OUTPATIENT
Start: 2019-03-01 | End: 2019-03-02 | Stop reason: HOSPADM

## 2019-03-01 RX ORDER — TRIAMCINOLONE ACETONIDE 1 MG/G
CREAM TOPICAL 2 TIMES DAILY
Qty: 454 G | Refills: 11 | Status: SHIPPED | OUTPATIENT
Start: 2019-03-01 | End: 2019-05-13

## 2019-03-01 RX ORDER — OFLOXACIN 3 MG/ML
1 SOLUTION/ DROPS OPHTHALMIC
Status: COMPLETED | OUTPATIENT
Start: 2019-03-01 | End: 2019-03-01

## 2019-03-01 RX ORDER — SODIUM CHLORIDE, SODIUM LACTATE, POTASSIUM CHLORIDE, CALCIUM CHLORIDE 600; 310; 30; 20 MG/100ML; MG/100ML; MG/100ML; MG/100ML
INJECTION, SOLUTION INTRAVENOUS CONTINUOUS
Status: DISCONTINUED | OUTPATIENT
Start: 2019-03-01 | End: 2019-03-02 | Stop reason: HOSPADM

## 2019-03-01 RX ORDER — MEPERIDINE HYDROCHLORIDE 25 MG/ML
12.5 INJECTION INTRAMUSCULAR; INTRAVENOUS; SUBCUTANEOUS
Status: DISCONTINUED | OUTPATIENT
Start: 2019-03-01 | End: 2019-03-02 | Stop reason: HOSPADM

## 2019-03-01 RX ORDER — ONDANSETRON 4 MG/1
4 TABLET, ORALLY DISINTEGRATING ORAL EVERY 30 MIN PRN
Status: DISCONTINUED | OUTPATIENT
Start: 2019-03-01 | End: 2019-03-02 | Stop reason: HOSPADM

## 2019-03-01 RX ORDER — FENTANYL CITRATE 50 UG/ML
25-50 INJECTION, SOLUTION INTRAMUSCULAR; INTRAVENOUS
Status: DISCONTINUED | OUTPATIENT
Start: 2019-03-01 | End: 2019-03-01 | Stop reason: HOSPADM

## 2019-03-01 RX ORDER — DEXAMETHASONE SODIUM PHOSPHATE 4 MG/ML
INJECTION, SOLUTION INTRA-ARTICULAR; INTRALESIONAL; INTRAMUSCULAR; INTRAVENOUS; SOFT TISSUE PRN
Status: DISCONTINUED | OUTPATIENT
Start: 2019-03-01 | End: 2019-03-01

## 2019-03-01 RX ORDER — PHENYLEPHRINE HYDROCHLORIDE 25 MG/ML
1 SOLUTION/ DROPS OPHTHALMIC
Status: COMPLETED | OUTPATIENT
Start: 2019-03-01 | End: 2019-03-01

## 2019-03-01 RX ORDER — LIDOCAINE 40 MG/G
CREAM TOPICAL
Status: DISCONTINUED | OUTPATIENT
Start: 2019-03-01 | End: 2019-03-01 | Stop reason: HOSPADM

## 2019-03-01 RX ORDER — CYCLOPENTOLATE HYDROCHLORIDE 10 MG/ML
1 SOLUTION/ DROPS OPHTHALMIC
Status: COMPLETED | OUTPATIENT
Start: 2019-03-01 | End: 2019-03-01

## 2019-03-01 RX ORDER — OXYCODONE HYDROCHLORIDE 5 MG/1
5 TABLET ORAL EVERY 4 HOURS PRN
Status: DISCONTINUED | OUTPATIENT
Start: 2019-03-01 | End: 2019-03-02 | Stop reason: HOSPADM

## 2019-03-01 RX ORDER — SODIUM CHLORIDE, SODIUM LACTATE, POTASSIUM CHLORIDE, CALCIUM CHLORIDE 600; 310; 30; 20 MG/100ML; MG/100ML; MG/100ML; MG/100ML
INJECTION, SOLUTION INTRAVENOUS CONTINUOUS
Status: DISCONTINUED | OUTPATIENT
Start: 2019-03-01 | End: 2019-03-01 | Stop reason: HOSPADM

## 2019-03-01 RX ORDER — BALANCED SALT SOLUTION 6.4; .75; .48; .3; 3.9; 1.7 MG/ML; MG/ML; MG/ML; MG/ML; MG/ML; MG/ML
SOLUTION OPHTHALMIC PRN
Status: DISCONTINUED | OUTPATIENT
Start: 2019-03-01 | End: 2019-03-01 | Stop reason: HOSPADM

## 2019-03-01 RX ORDER — TRIAMCINOLONE ACETONIDE 1 MG/G
CREAM TOPICAL 2 TIMES DAILY
Qty: 454 G | Refills: 11 | Status: SHIPPED | OUTPATIENT
Start: 2019-03-01 | End: 2019-03-01

## 2019-03-01 RX ORDER — NALOXONE HYDROCHLORIDE 0.4 MG/ML
.1-.4 INJECTION, SOLUTION INTRAMUSCULAR; INTRAVENOUS; SUBCUTANEOUS
Status: DISCONTINUED | OUTPATIENT
Start: 2019-03-01 | End: 2019-03-02 | Stop reason: HOSPADM

## 2019-03-01 RX ORDER — TETRACAINE HYDROCHLORIDE 5 MG/ML
SOLUTION OPHTHALMIC PRN
Status: DISCONTINUED | OUTPATIENT
Start: 2019-03-01 | End: 2019-03-01 | Stop reason: HOSPADM

## 2019-03-01 RX ORDER — LIDOCAINE HYDROCHLORIDE 10 MG/ML
INJECTION, SOLUTION EPIDURAL; INFILTRATION; INTRACAUDAL; PERINEURAL PRN
Status: DISCONTINUED | OUTPATIENT
Start: 2019-03-01 | End: 2019-03-01 | Stop reason: HOSPADM

## 2019-03-01 RX ADMIN — ACETAMINOPHEN 975 MG: 325 TABLET ORAL at 06:28

## 2019-03-01 RX ADMIN — PROPARACAINE HYDROCHLORIDE 1 DROP: 5 SOLUTION/ DROPS OPHTHALMIC at 06:34

## 2019-03-01 RX ADMIN — DEXAMETHASONE SODIUM PHOSPHATE 4 MG: 4 INJECTION, SOLUTION INTRA-ARTICULAR; INTRALESIONAL; INTRAMUSCULAR; INTRAVENOUS; SOFT TISSUE at 07:18

## 2019-03-01 RX ADMIN — OFLOXACIN 1 DROP: 3 SOLUTION/ DROPS OPHTHALMIC at 06:40

## 2019-03-01 RX ADMIN — FLURBIPROFEN SODIUM 1 DROP: 0.3 SOLUTION/ DROPS OPHTHALMIC at 06:34

## 2019-03-01 RX ADMIN — PHENYLEPHRINE HYDROCHLORIDE 1 DROP: 25 SOLUTION/ DROPS OPHTHALMIC at 06:45

## 2019-03-01 RX ADMIN — SODIUM CHLORIDE, SODIUM LACTATE, POTASSIUM CHLORIDE, CALCIUM CHLORIDE: 600; 310; 30; 20 INJECTION, SOLUTION INTRAVENOUS at 06:48

## 2019-03-01 RX ADMIN — OFLOXACIN 1 DROP: 3 SOLUTION/ DROPS OPHTHALMIC at 06:34

## 2019-03-01 RX ADMIN — PHENYLEPHRINE HYDROCHLORIDE 1 DROP: 25 SOLUTION/ DROPS OPHTHALMIC at 06:34

## 2019-03-01 RX ADMIN — SODIUM CHLORIDE, SODIUM LACTATE, POTASSIUM CHLORIDE, CALCIUM CHLORIDE: 600; 310; 30; 20 INJECTION, SOLUTION INTRAVENOUS at 07:05

## 2019-03-01 RX ADMIN — OFLOXACIN 1 DROP: 3 SOLUTION/ DROPS OPHTHALMIC at 06:45

## 2019-03-01 RX ADMIN — CYCLOPENTOLATE HYDROCHLORIDE 1 DROP: 10 SOLUTION/ DROPS OPHTHALMIC at 06:45

## 2019-03-01 RX ADMIN — PHENYLEPHRINE HYDROCHLORIDE 1 DROP: 25 SOLUTION/ DROPS OPHTHALMIC at 06:40

## 2019-03-01 RX ADMIN — FENTANYL CITRATE 50 MCG: 50 INJECTION, SOLUTION INTRAMUSCULAR; INTRAVENOUS at 07:14

## 2019-03-01 RX ADMIN — FLURBIPROFEN SODIUM 1 DROP: 0.3 SOLUTION/ DROPS OPHTHALMIC at 06:48

## 2019-03-01 RX ADMIN — ONDANSETRON 4 MG: 2 INJECTION INTRAMUSCULAR; INTRAVENOUS at 07:18

## 2019-03-01 RX ADMIN — FLURBIPROFEN SODIUM 1 DROP: 0.3 SOLUTION/ DROPS OPHTHALMIC at 06:40

## 2019-03-01 RX ADMIN — CYCLOPENTOLATE HYDROCHLORIDE 1 DROP: 10 SOLUTION/ DROPS OPHTHALMIC at 06:40

## 2019-03-01 RX ADMIN — CYCLOPENTOLATE HYDROCHLORIDE 1 DROP: 10 SOLUTION/ DROPS OPHTHALMIC at 06:34

## 2019-03-01 RX ADMIN — FLURBIPROFEN SODIUM 1 DROP: 0.3 SOLUTION/ DROPS OPHTHALMIC at 06:45

## 2019-03-01 ASSESSMENT — SLIT LAMP EXAM - LIDS: COMMENTS: NORMAL

## 2019-03-01 ASSESSMENT — PAIN SCALES - GENERAL: PAINLEVEL: NO PAIN (0)

## 2019-03-01 ASSESSMENT — TONOMETRY
OS_IOP_MMHG: 20
IOP_METHOD: TONOPEN

## 2019-03-01 ASSESSMENT — VISUAL ACUITY
OS_SC: 20/30
METHOD: SNELLEN - LINEAR
OS_SC+: -2

## 2019-03-01 ASSESSMENT — MIFFLIN-ST. JEOR: SCORE: 1353.53

## 2019-03-01 ASSESSMENT — ENCOUNTER SYMPTOMS: SEIZURES: 0

## 2019-03-01 ASSESSMENT — EXTERNAL EXAM - LEFT EYE: OS_EXAM: NORMAL

## 2019-03-01 NOTE — OR NURSING
Pt alerted RN in recovery of hives on stomach, thighs, and now on arms. Pt states hives started a few days ago and have not resolved with OTC remedies. Pt requested to go to an urgent care or walk in clinic before follow up appt with Dr Garcia at 1130, attempting to get pt appt in building. Pt took zyrtec in recovery, met discharge criteria per today's surgical procedure. Will discharge pt to primary care appt.

## 2019-03-01 NOTE — NURSING NOTE
Chief Complaint   Patient presents with     Hives     hives for 4x days, slight improvement since onset       Anton Wheeler, EMT 9:24 AM on 3/1/2019.

## 2019-03-01 NOTE — NURSING NOTE
Chief Complaints and History of Present Illnesses   Patient presents with     Post Op (Ophthalmology) Left Eye     Left Eye Cataract Removal with Intraocular Lens Implant     Chief Complaint(s) and History of Present Illness(es)     Post Op (Ophthalmology) Left Eye     Laterality: left eye    Onset: hours ago    Quality: blurred vision    Severity: moderate    Frequency: constantly    Timing: throughout the day    Course: stable    Associated symptoms: Negative for eye pain    Treatments tried: eye drops    Pain scale: 0/10    Comments: Left Eye Cataract Removal with Intraocular Lens Implant              Comments     No eye pain today. Vision is blurry. Patient states they have their post op drops from the pharmacy.     Camila Bruce COT 11:29 AM March 1, 2019

## 2019-03-01 NOTE — DISCHARGE INSTRUCTIONS
[facility]     Home Care Following Cataract Surgery    If you have a gauze eye patch on, please do not remove it until it is removed by your doctor at your first appointment after your surgery.  You will start your eye drops the next day.    OR    If you only have a clear eye shield on, you may remove the eye shield when you get home and begin eye drops today as directed by your doctor.      Wear the clear eye shield for protection when sleeping for the next 5 days.      Do not rub the eye that had the operation.      Your eye might be sensitive to light.  Wearing sunglasses may be more comfortable for you.      You may have some discomfort and irritation.  Acetaminophen (Tylenol) or Ibuprofen (Advil) may be taken for discomfort. If pain persists please call your doctor s office.      Keep the eye that had the surgery dry. You may wash your hair, bathe or shower, but keep your eye closed while doing so.       Avoid bending over, strenuous activity or heavy lifting until this activity has been approved by your doctor.      You have a follow-up appointment with your doctor tomorrow at the [facility] ([phone]).  Bring all your prescribed eye drops with you to this follow-up appointment.        If you take glaucoma medications, bring them with you to your follow-up appointment tomorrow.      Use medication exactly as prescribed by your doctor. You may restart your regular home medications.       Occasional blood-tinged tears are normal the day or two after surgery. However, if there is large or persistent bleeding from the eye, that is not normal, and you should contact the clinic.      Call your doctor s office at [phone] if any of the following should occur:    - Any sudden vision changes, including decreased vision  - Nausea or severe headache  - Increase in pain that is not controlled with Acetaminophen (Tylenol) or Ibuprofen (Advil)  - Signs of  infection (pus, increasing redness or tenderness)  - Severe sensitivity to light  - An increase in floaters (black spots in front of your vision)    [facility]  Home Care Following Anesthesia  For 24 hours after surgery:  1. Get plenty of rest.  A responsible adult must stay with you for at least 24 hours after you leave the surgery center.  2. Do not drive or use heavy equipment.  If you have weakness or tingling, don't drive or use heavy equipment until this feeling goes away.   3. Do not drink alcohol.   4. Avoid strenuous or risky activities.  Ask for help when climbing stairs.  5. You may feel lightheaded.  IF so, sit for a few minutes before standing.  Have someone help you get up.   6. If you have nausea (feel sick to your stomach): Drink only clear liquids such as apple juice, ginger ale, broth or 7-Up.  Rest may also help.  Be sure to drink enough fluids.  Move to a regular diet as you feel able.   7. You may have a slight fever.  Call the doctor if your fever is over 100 F (37.7 C) (taken under the tongue) or lasts longer than 24 hours.  8. You may have a dry mouth, a sore throat, muscle aches or trouble sleeping. These should go away after 24 hours.  9. Do not make important or legal decisions.               Tips for taking pain medications  To get the best pain relief possible, remember these points:    Take pain medications as directed, before pain becomes severe.    Pain medication can upset your stomach: taking it with food may help.    Constipation is a common side effect of pain medication. Drink plenty of  fluids.    Eat foods high in fiber. Take a stool softener if recommended by your doctor or pharmacist.    Do not drink alcohol, drive or operate machinery while taking pain medications.    Ask about other ways to control pain, such as with heat, ice or relaxation.    Tylenol/Acetaminophen Consumption  To help encourage the safe use of acetaminophen, the  makers of TYLENOL  have lowered the maximum daily dose for single-ingredient Extra Strength TYLENOL  (acetaminophen) products sold in the [place] from 8 pills per day (4,000 mg) to 6 pills per day (3,000 mg). The dosing interval has also changed from 2 pills every 4-6 hours to 2 pills every 6 hours.    If you feel your pain relief is insufficient, you may take Tylenol/Acetaminophen in addition to your narcotic pain medication.     Be careful not to exceed 3,000 mg of Tylenol/Acetaminophen in a 24 hour period from all sources.    If you are taking extra strength Tylenol/acetaminophen (500 mg), the maximum dose is 6 tablets in 24 hours.    If you are taking regular strength acetaminophen (325 mg), the maximum dose is 9 tablets in 24 hours.    Call a doctor for any of the followin. Signs of infection (fever, growing tenderness at the surgery site, a large amount of drainage or bleeding, severe pain, foul-smelling drainage, redness, swelling).  2. It has been over 8 to 10 hours since surgery and you are still not able to urinate (pass water).  3. Headache for over 24 hours.  4. Numbness, tingling or weakness the day after surgery (if you had spinal anesthesia).  Your doctor is:       Dr. Danna Garcia, Ophthalmology: [phone]               Or dial [phone] and ask for the resident on call for:  Ophthalmology  For emergency care, call the:  [facility]:  [phone] (TTY for hearing impaired: [phone])

## 2019-03-01 NOTE — PROCEDURES
Ophthalmology Post-op Procedure Note    Surgical Service:    Ophthalmology & Visual Sciences  Date Performed:      March 1, 2019  Location: [facility]      Pre-operative Diagnosis: Visually significant cataract, left eye  Post-operative Diagnosis:  Pseudophakia, left eye  Operative Procedure:  Phacoemulsification with intraocular lens implantation, left eye    Surgeon(s):  Fellow/Staff Surgeon:       Danna Garcia MD  Resident Surgeon:            Denise Carpio MD    Anesthesia:   Topical/MAC  Findings:  No unusual findings   Blood Loss:    Minimal  Implants:  ZCB00 13.0 intraocular lens  Specimens:  None     Complications:  The patient did not experience any complications.   Condition: Stable    Operative Report Completion:    Description of Operation/Procedure:    After appropriate informed consent was obtained, the patient was brought to the operating room. The appropriate cardiac and blood pressure monitors were placed. A final pause occurred just before the start of the procedure during which the entire procedure team actively confirmed the correct patient, procedure, site, special equipment and special requirements. The patient was prepped and draped in the usual sterile fashion using 5% povidone/iodine.     An eyelid speculum was placed to open the eyelids. A paracentesis port was placed approximately sixty degrees to the left of the planned temporal incision location using the sideport blade. Approximately 0.8 cc of 1% nonpreserved lidocaine was placed into the anterior chamber. The anterior chamber was filled with dispersive viscoelastic. A clear corneal temporal incision was made with a metal 2.6 mm keratome. A round continuous tear capsulorhexis was initiated with a cystotome and completed with the Utrata forceps. Balanced salt solution on a cannula was used to perform hydrodissection. The nucleus was removed using phacoemulsification with a chop technique. The remaining cortical material was  removed using the irrigation/aspiration handpiece. The capsular bag was filled with dispersive viscoelastic. A lens of the  model and power listed above was placed into the capsular bag using the cartridge injection system. The remaining viscoelastic was removed using the irrigation aspiration handpiece. The paracentesis and temporal wounds were hydrated with balanced salt solution. Intracameral moxifloxacin was administered. At the conclusion of the case, the wounds were felt to be watertight, the pupil was round, the lens was centered, and the anterior chamber was deep. A few drops of antibiotic and prednisolone were given to the operative eye. The eyelid speculum was removed. A shield was placed over the operative eye.    Attending Attestation:  I was present for and performed the entire procedure.  Danna Garcia MD

## 2019-03-01 NOTE — ANESTHESIA PREPROCEDURE EVALUATION
Anesthesia Pre-Procedure Evaluation    Patient: Valorie Hahn   MRN:     [id number] Gender:   female   Age:    63 year old :      1955        Preoperative Diagnosis: Visually Significant Cataract   Procedure(s):  Left Eye Cataract Removal with Intraocular Lens Implant     Past Medical History:   Diagnosis Date     Arthritis     SI joint, osteoarthritis     Complication of anesthesia      Esophageal reflux      Genital herpes, unspecified      Mild intermittent asthma with exacerbation      PONV (postoperative nausea and vomiting)       Past Surgical History:   Procedure Laterality Date     C APPENDECTOMY       C NONSPECIFIC PROCEDURE      Anal fissure repair     COLONOSCOPY      some polyps removed     COLONOSCOPY WITH CO2 INSUFFLATION N/A 1/3/2019    Procedure: COLONOSCOPY WITH CO2 INSUFFLATION;  Surgeon: Arnold Adler MD;  Location: [facility] OR     HC TOOTH EXTRACTION W/FORCEP            Anesthesia Evaluation     .             ROS/MED HX    ENT/Pulmonary:     (+)Intermittent asthma , . .    Neurologic:  - neg neurologic ROS    (-) seizures, CVA and TIA   Cardiovascular:  - neg cardiovascular ROS       METS/Exercise Tolerance:     Hematologic:  - neg hematologic  ROS       Musculoskeletal:  - neg musculoskeletal ROS       GI/Hepatic:  - neg GI/hepatic ROS       Renal/Genitourinary:  - ROS Renal section negative       Endo: Comment: BMI 32    (+) Obesity, .      Psychiatric:  - neg psychiatric ROS       Infectious Disease:  - neg infectious disease ROS       Malignancy:      - no malignancy   Other:                         PHYSICAL EXAM:   Mental Status/Neuro: A/A/O   Airway: Facies: Feasible  Mallampati: II  Mouth/Opening: Full  TM distance: > 6 cm  Neck ROM: Full   Respiratory:    CV:    Comments:                    Lab Results   Component Value Date    WBC 6.3 2017    HGB 13.8 2017    HCT 39.9 2017     2017    CRP <2.9 2017    SED 8 2017     " 08/02/2017    POTASSIUM 3.9 08/02/2017    CHLORIDE 104 08/02/2017    CO2 27 08/02/2017    BUN 11 08/02/2017    CR 0.60 08/02/2017    GLC 89 08/02/2017    TAMMI 9.2 08/02/2017    PHOS 3.1 08/23/2005    MAG 1.8 08/23/2005    ALBUMIN 4.0 08/02/2017    PROTTOTAL 7.2 08/02/2017    ALT 21 08/02/2017    AST 22 08/02/2017    ALKPHOS 69 08/02/2017    BILITOTAL 1.1 08/02/2017    TSH 3.08 08/10/2016       Preop Vitals  BP Readings from Last 3 Encounters:   02/13/19 122/79   01/03/19 100/64   09/27/18 133/77    Pulse Readings from Last 3 Encounters:   02/13/19 62   01/03/19 54   09/27/18 76      Resp Readings from Last 3 Encounters:   02/13/19 20   01/03/19 20   04/17/18 20    SpO2 Readings from Last 3 Encounters:   02/13/19 97%   01/03/19 96%   04/17/18 98%      Temp Readings from Last 1 Encounters:   02/13/19 36.9  C (98.5  F) (Oral)    Ht Readings from Last 1 Encounters:   02/13/19 1.613 m (5' 3.5\")      Wt Readings from Last 1 Encounters:   02/13/19 83.5 kg (184 lb)    Estimated body mass index is 32.08 kg/m  as calculated from the following:    Height as of 2/13/19: 1.613 m (5' 3.5\").    Weight as of 2/13/19: 83.5 kg (184 lb).     LDA:            Assessment:   ASA SCORE: 2    NPO Status: > 2 hours since completed Clear Liquids; > 6 hours since completed Solid Foods   Documentation: H&P complete; Preop Testing complete; Consents complete   Proceeding: Proceed without further delay  Tobacco Use:  NO Active use of Tobacco/UNKNOWN Tobacco use status     Plan:   Anes. Type:  MAC   Pre-Induction: Midazolam IV   Induction:  Not applicable   Airway: Native Airway   Access/Monitoring: PIV   Maintenance: N/a   Emergence: N/a   Logistics: Same Day Surgery     Postop Pain/Sedation Strategy:  Standard-Options: Opioids PRN     PONV Management:  Adult Risk Factors: Female, H/o PONV or Motion Sickness, Non-Smoker, Postop Opioids     CONSENT: Direct conversation   Plan and risks discussed with: Patient          Comments for " Plan/Consent:  Risks, benefits, and alternatives of MAC discussed with patient. They understand the risks including possible recall of events in the room. They understand there is a possibility that conversion to general anesthesia may be needed. Patient consents.                           Maty Bazan MD

## 2019-03-01 NOTE — PROGRESS NOTES
POD#0, status post cataract surgery, left eye    No complaints.  Denies eye pain.    Impression/Plan:  Pseudophakia, OS: POD0, good post-operative appearance. IOP reasonable.    - Fluoroquinolone antibiotic 4x daily in the surgical eye for 1 week  - Prednisolone 4x daily in the surgical eye for 1 week, then weekly taper\    Eye protection at all times and eye shield at night for 1 week.    Limited activities with no exercise or heavy lifting for 1 week.    Instructed patient to contact us for decreasing vision, eye pain, new floaters or flashes of light or other concerning symptoms.    Written instructions given    Return to clinic 3-4 weeks with refraction and dilation.    Teaching statement:  Complete documentation of historical and exam elements from today's encounter can be found in the full encounter summary report (not reduplicated in this progress note). I personally obtained the chief complaint(s) and history of present illness.  I confirmed and edited as necessary the review of systems, past medical/surgical history, family history, social history, and examination findings as documented by others; and I examined the patient myself. I personally reviewed the relevant tests, images, and reports as documented above.     I formulated and edited as necessary the assessment and plan and discussed the findings and management plan with the patient and family.    Danna Garcia MD  Comprehensive Ophthalmology & Ocular Pathology  Department of Ophthalmology and Visual Neurosciences  [email or web address]  Pager [phone]

## 2019-03-01 NOTE — PATIENT INSTRUCTIONS
[facility] Medication Refill Request Information:  * Please contact your pharmacy regarding ANY request for medication refills.  ** [facility] Prescription Fax = [phone]  * Please allow 3 business days for routine medication refills.  * Please allow 5 business days for controlled substance medication refills.     [facility] Test Result notification information:  *You will be notified with in 7-10 days of your appointment day regarding the results of your test.  If you are on MyChart you will be notified as soon as the provider has reviewed the results and signed off on them.    [facility]: [phone]

## 2019-03-01 NOTE — ANESTHESIA CARE TRANSFER NOTE
Patient: Valorie Hahn    Procedure(s):  Left Eye Cataract Removal with Intraocular Lens Implant    Diagnosis: Visually Significant Cataract  Diagnosis Additional Information: No value filed.    Anesthesia Type:   No value filed.     Note:  Airway :Room Air  Patient transferred to:Phase II  Handoff Report: Identifed the Patient, Identified the Reponsible Provider, Reviewed the pertinent medical history, Discussed the surgical course, Reviewed Intra-OP anesthesia mangement and issues during anesthesia, Set expectations for post-procedure period and Allowed opportunity for questions and acknowledgement of understanding      Vitals: (Last set prior to Anesthesia Care Transfer)    CRNA VITALS  3/1/2019 0714 - 3/1/2019 0754      3/1/2019             Pulse:  60    Ht Rate:  60    SpO2:  99 %    Resp Rate (set):  10                Electronically Signed By: BC Leal CRNA  March 1, 2019  7:54 AM

## 2019-03-01 NOTE — ANESTHESIA POSTPROCEDURE EVALUATION
Anesthesia POST Procedure Evaluation    Patient: Valorie Hahn   MRN:     [id number] Gender:   female   Age:    63 year old :      1955        Preoperative Diagnosis: Visually Significant Cataract   Procedure(s):  Left Eye Cataract Removal with Intraocular Lens Implant   Postop Comments: No value filed.       Anesthesia Type:  MAC    Reportable Event: NO     PAIN: Uncomplicated   Sign Out status: Comfortable, Well controlled pain     PONV: No PONV   Sign Out status:  No Nausea or Vomiting     Neuro/Psych: Uneventful perioperative course   Sign Out Status: Preoperative baseline; Age appropriate mentation     Airway/Resp.: Uneventful perioperative course   Sign Out Status: Non labored breathing, age appropriate RR; Resp. Status within EXPECTED Parameters     CV: Uneventful perioperative course   Sign Out status: Appropriate BP and perfusion indices; Appropriate HR/Rhythm     Disposition:   Sign Out in:  Phase II  Disposition:  Home  Recovery Course: Uneventful  Follow-Up: Not required           Last Anesthesia Record Vitals:  CRNA VITALS  3/1/2019 0714 - 3/1/2019 0814      3/1/2019             Pulse:  60    Ht Rate:  60    SpO2:  99 %    Resp Rate (set):  10          Last PACU/Preop Vitals:  Vitals:    19 0600 19 0747 19 0801   BP: 110/70 104/63 110/63   Pulse: 72 54 55   Resp: 18 15 16   Temp: 36.7  C (98  F) 36.2  C (97.1  F) 36.3  C (97.3  F)   SpO2: 93% 99% 97%         Electronically Signed By: Maty Bazan MD, 2019, 11:30 AM

## 2019-03-01 NOTE — TELEPHONE ENCOUNTER
[facility] Call Center    Phone Message    May a detailed message be left on voicemail: yes    Reason for Call: Other: [facility] calling to schedule an urgent appointment for Valorie to be seen for a rash that is growing.  Valorie just had cateract surgery, but had the rash prior to surgery.  Appointment scheduled for 0930     Action Taken: Message routed to:  [facility] ([facility] TAY WEEKS

## 2019-03-05 NOTE — PROGRESS NOTES
Valorie was seen today for possible hives or allergic reaction.  She just had cataract surgery today, and that went well, however they sent her to the [facility] for evaluation of a rash.  She states it started prior to the surgery, and may have started after trying a new bath product/liquid soap.  It is red, raised, itchy, mostly over the trunk.  No new meds, no other exposures, no known dietary triggers.      She denies any wheezing, shortness of breath or tongue swelling.      On exam  /70   Pulse 64   Wt 81.6 kg (180 lb)   LMP 02/14/2010   SpO2 94%   BMI 31.39 kg/m     Lungs CTA  Skin diffuse patches of poorly marginated erythema with fine overlying scale noted    A/P    Atopic dermatitis, vs contact dermatitis, unspecified type  -     triamcinolone (KENALOG) 0.1 % external cream; Apply topically 2 times daily    --Allyn Arroyo MD

## 2019-03-06 DIAGNOSIS — H25.11 NUCLEAR SCLEROTIC CATARACT, RIGHT: Primary | ICD-10-CM

## 2019-03-06 RX ORDER — PREDNISOLONE ACETATE 10 MG/ML
SUSPENSION/ DROPS OPHTHALMIC
Qty: 1 BOTTLE | Refills: 1 | Status: SHIPPED | OUTPATIENT
Start: 2019-03-06 | End: 2019-04-15

## 2019-03-06 RX ORDER — OFLOXACIN 3 MG/ML
SOLUTION/ DROPS OPHTHALMIC
Qty: 1 BOTTLE | Refills: 0 | Status: SHIPPED | OUTPATIENT
Start: 2019-03-06 | End: 2019-04-15

## 2019-03-08 ENCOUNTER — HOSPITAL ENCOUNTER (OUTPATIENT)
Facility: AMBULATORY SURGERY CENTER | Age: 64
End: 2019-03-08
Attending: OPHTHALMOLOGY
Payer: COMMERCIAL

## 2019-03-08 ENCOUNTER — OFFICE VISIT (OUTPATIENT)
Dept: OPHTHALMOLOGY | Facility: CLINIC | Age: 64
End: 2019-03-08
Payer: COMMERCIAL

## 2019-03-08 ENCOUNTER — ANESTHESIA (OUTPATIENT)
Dept: SURGERY | Facility: AMBULATORY SURGERY CENTER | Age: 64
End: 2019-03-08

## 2019-03-08 VITALS
HEART RATE: 72 BPM | WEIGHT: 180 LBS | HEIGHT: 64 IN | OXYGEN SATURATION: 93 % | RESPIRATION RATE: 16 BRPM | TEMPERATURE: 97.8 F | BODY MASS INDEX: 30.73 KG/M2 | DIASTOLIC BLOOD PRESSURE: 75 MMHG | SYSTOLIC BLOOD PRESSURE: 119 MMHG

## 2019-03-08 DIAGNOSIS — Z96.1 PSEUDOPHAKIA OF RIGHT EYE: Primary | ICD-10-CM

## 2019-03-08 DIAGNOSIS — H25.11 NUCLEAR SCLEROTIC CATARACT, RIGHT: Primary | ICD-10-CM

## 2019-03-08 DEVICE — EYE IMP IOL AMO PCL TECNIS ZCB00 12.5: Type: IMPLANTABLE DEVICE | Site: EYE | Status: FUNCTIONAL

## 2019-03-08 RX ORDER — TETRACAINE HYDROCHLORIDE 5 MG/ML
SOLUTION OPHTHALMIC PRN
Status: DISCONTINUED | OUTPATIENT
Start: 2019-03-08 | End: 2019-03-08 | Stop reason: HOSPADM

## 2019-03-08 RX ORDER — SODIUM CHLORIDE, SODIUM LACTATE, POTASSIUM CHLORIDE, CALCIUM CHLORIDE 600; 310; 30; 20 MG/100ML; MG/100ML; MG/100ML; MG/100ML
INJECTION, SOLUTION INTRAVENOUS CONTINUOUS
Status: DISCONTINUED | OUTPATIENT
Start: 2019-03-08 | End: 2019-03-08 | Stop reason: HOSPADM

## 2019-03-08 RX ORDER — ACETAMINOPHEN 325 MG/1
975 TABLET ORAL ONCE
Status: COMPLETED | OUTPATIENT
Start: 2019-03-08 | End: 2019-03-08

## 2019-03-08 RX ORDER — MEPERIDINE HYDROCHLORIDE 25 MG/ML
12.5 INJECTION INTRAMUSCULAR; INTRAVENOUS; SUBCUTANEOUS
Status: DISCONTINUED | OUTPATIENT
Start: 2019-03-08 | End: 2019-03-09 | Stop reason: HOSPADM

## 2019-03-08 RX ORDER — FLURBIPROFEN SODIUM 0.3 MG/ML
1 SOLUTION/ DROPS OPHTHALMIC
Status: DISCONTINUED | OUTPATIENT
Start: 2019-03-08 | End: 2019-03-08 | Stop reason: HOSPADM

## 2019-03-08 RX ORDER — MOXIFLOXACIN IN NACL,ISO-OS/PF 0.3MG/0.3
SYRINGE (ML) INTRAOCULAR PRN
Status: DISCONTINUED | OUTPATIENT
Start: 2019-03-08 | End: 2019-03-08 | Stop reason: HOSPADM

## 2019-03-08 RX ORDER — CYCLOPENTOLATE HYDROCHLORIDE 10 MG/ML
1 SOLUTION/ DROPS OPHTHALMIC
Status: COMPLETED | OUTPATIENT
Start: 2019-03-08 | End: 2019-03-08

## 2019-03-08 RX ORDER — ONDANSETRON 2 MG/ML
4 INJECTION INTRAMUSCULAR; INTRAVENOUS EVERY 30 MIN PRN
Status: DISCONTINUED | OUTPATIENT
Start: 2019-03-08 | End: 2019-03-09 | Stop reason: HOSPADM

## 2019-03-08 RX ORDER — NALOXONE HYDROCHLORIDE 0.4 MG/ML
.1-.4 INJECTION, SOLUTION INTRAMUSCULAR; INTRAVENOUS; SUBCUTANEOUS
Status: DISCONTINUED | OUTPATIENT
Start: 2019-03-08 | End: 2019-03-09 | Stop reason: HOSPADM

## 2019-03-08 RX ORDER — PROPARACAINE HYDROCHLORIDE 5 MG/ML
1 SOLUTION/ DROPS OPHTHALMIC ONCE
Status: COMPLETED | OUTPATIENT
Start: 2019-03-08 | End: 2019-03-08

## 2019-03-08 RX ORDER — OFLOXACIN 3 MG/ML
1 SOLUTION/ DROPS OPHTHALMIC
Status: COMPLETED | OUTPATIENT
Start: 2019-03-08 | End: 2019-03-08

## 2019-03-08 RX ORDER — SODIUM CHLORIDE, SODIUM LACTATE, POTASSIUM CHLORIDE, CALCIUM CHLORIDE 600; 310; 30; 20 MG/100ML; MG/100ML; MG/100ML; MG/100ML
INJECTION, SOLUTION INTRAVENOUS CONTINUOUS
Status: DISCONTINUED | OUTPATIENT
Start: 2019-03-08 | End: 2019-03-09 | Stop reason: HOSPADM

## 2019-03-08 RX ORDER — SODIUM CHLORIDE, SODIUM LACTATE, POTASSIUM CHLORIDE, CALCIUM CHLORIDE 600; 310; 30; 20 MG/100ML; MG/100ML; MG/100ML; MG/100ML
INJECTION, SOLUTION INTRAVENOUS CONTINUOUS PRN
Status: DISCONTINUED | OUTPATIENT
Start: 2019-03-08 | End: 2019-03-08

## 2019-03-08 RX ORDER — LIDOCAINE HYDROCHLORIDE 10 MG/ML
INJECTION, SOLUTION EPIDURAL; INFILTRATION; INTRACAUDAL; PERINEURAL PRN
Status: DISCONTINUED | OUTPATIENT
Start: 2019-03-08 | End: 2019-03-08 | Stop reason: HOSPADM

## 2019-03-08 RX ORDER — BALANCED SALT SOLUTION 6.4; .75; .48; .3; 3.9; 1.7 MG/ML; MG/ML; MG/ML; MG/ML; MG/ML; MG/ML
SOLUTION OPHTHALMIC PRN
Status: DISCONTINUED | OUTPATIENT
Start: 2019-03-08 | End: 2019-03-08 | Stop reason: HOSPADM

## 2019-03-08 RX ORDER — ONDANSETRON 4 MG/1
4 TABLET, ORALLY DISINTEGRATING ORAL EVERY 30 MIN PRN
Status: DISCONTINUED | OUTPATIENT
Start: 2019-03-08 | End: 2019-03-09 | Stop reason: HOSPADM

## 2019-03-08 RX ORDER — PHENYLEPHRINE HYDROCHLORIDE 25 MG/ML
1 SOLUTION/ DROPS OPHTHALMIC
Status: DISCONTINUED | OUTPATIENT
Start: 2019-03-08 | End: 2019-03-08 | Stop reason: HOSPADM

## 2019-03-08 RX ORDER — ONDANSETRON 2 MG/ML
INJECTION INTRAMUSCULAR; INTRAVENOUS PRN
Status: DISCONTINUED | OUTPATIENT
Start: 2019-03-08 | End: 2019-03-08

## 2019-03-08 RX ORDER — FENTANYL CITRATE 50 UG/ML
INJECTION, SOLUTION INTRAMUSCULAR; INTRAVENOUS PRN
Status: DISCONTINUED | OUTPATIENT
Start: 2019-03-08 | End: 2019-03-08

## 2019-03-08 RX ORDER — OXYCODONE HYDROCHLORIDE 5 MG/1
5 TABLET ORAL EVERY 4 HOURS PRN
Status: DISCONTINUED | OUTPATIENT
Start: 2019-03-08 | End: 2019-03-09 | Stop reason: HOSPADM

## 2019-03-08 RX ORDER — FENTANYL CITRATE 50 UG/ML
25-50 INJECTION, SOLUTION INTRAMUSCULAR; INTRAVENOUS
Status: DISCONTINUED | OUTPATIENT
Start: 2019-03-08 | End: 2019-03-08 | Stop reason: HOSPADM

## 2019-03-08 RX ORDER — GABAPENTIN 300 MG/1
300 CAPSULE ORAL ONCE
Status: COMPLETED | OUTPATIENT
Start: 2019-03-08 | End: 2019-03-08

## 2019-03-08 RX ADMIN — CYCLOPENTOLATE HYDROCHLORIDE 1 DROP: 10 SOLUTION/ DROPS OPHTHALMIC at 07:13

## 2019-03-08 RX ADMIN — GABAPENTIN 300 MG: 300 CAPSULE ORAL at 07:17

## 2019-03-08 RX ADMIN — ONDANSETRON 4 MG: 2 INJECTION INTRAMUSCULAR; INTRAVENOUS at 07:31

## 2019-03-08 RX ADMIN — CYCLOPENTOLATE HYDROCHLORIDE 1 DROP: 10 SOLUTION/ DROPS OPHTHALMIC at 07:18

## 2019-03-08 RX ADMIN — CYCLOPENTOLATE HYDROCHLORIDE 1 DROP: 10 SOLUTION/ DROPS OPHTHALMIC at 07:04

## 2019-03-08 RX ADMIN — ACETAMINOPHEN 975 MG: 325 TABLET ORAL at 07:17

## 2019-03-08 RX ADMIN — SODIUM CHLORIDE, SODIUM LACTATE, POTASSIUM CHLORIDE, CALCIUM CHLORIDE: 600; 310; 30; 20 INJECTION, SOLUTION INTRAVENOUS at 07:24

## 2019-03-08 RX ADMIN — FLURBIPROFEN SODIUM 1 DROP: 0.3 SOLUTION/ DROPS OPHTHALMIC at 07:13

## 2019-03-08 RX ADMIN — FENTANYL CITRATE 50 MCG: 50 INJECTION, SOLUTION INTRAMUSCULAR; INTRAVENOUS at 07:28

## 2019-03-08 RX ADMIN — SODIUM CHLORIDE, SODIUM LACTATE, POTASSIUM CHLORIDE, CALCIUM CHLORIDE: 600; 310; 30; 20 INJECTION, SOLUTION INTRAVENOUS at 07:17

## 2019-03-08 RX ADMIN — OFLOXACIN 1 DROP: 3 SOLUTION/ DROPS OPHTHALMIC at 07:13

## 2019-03-08 RX ADMIN — PROPARACAINE HYDROCHLORIDE 1 DROP: 5 SOLUTION/ DROPS OPHTHALMIC at 07:04

## 2019-03-08 RX ADMIN — OFLOXACIN 1 DROP: 3 SOLUTION/ DROPS OPHTHALMIC at 07:14

## 2019-03-08 RX ADMIN — FLURBIPROFEN SODIUM 1 DROP: 0.3 SOLUTION/ DROPS OPHTHALMIC at 07:04

## 2019-03-08 RX ADMIN — FLURBIPROFEN SODIUM 1 DROP: 0.3 SOLUTION/ DROPS OPHTHALMIC at 07:05

## 2019-03-08 RX ADMIN — OFLOXACIN 1 DROP: 3 SOLUTION/ DROPS OPHTHALMIC at 07:18

## 2019-03-08 ASSESSMENT — TONOMETRY
OD_IOP_MMHG: 13
IOP_METHOD: TONOPEN
OS_IOP_MMHG: 11

## 2019-03-08 ASSESSMENT — VISUAL ACUITY
OD_SC: 20/150
OS_SC: 20/20
OD_PH_SC: 20/25
METHOD: SNELLEN - LINEAR

## 2019-03-08 ASSESSMENT — SLIT LAMP EXAM - LIDS: COMMENTS: NORMAL

## 2019-03-08 ASSESSMENT — EXTERNAL EXAM - RIGHT EYE: OD_EXAM: NORMAL

## 2019-03-08 ASSESSMENT — MIFFLIN-ST. JEOR: SCORE: 1353.53

## 2019-03-08 ASSESSMENT — ENCOUNTER SYMPTOMS: SEIZURES: 0

## 2019-03-08 NOTE — DISCHARGE INSTRUCTIONS
[facility]  Home Care Following Anesthesia  For 24 hours after surgery:  1. Get plenty of rest.  A responsible adult must stay with you for at least 24 hours after you leave the surgery center.  2. Do not drive or use heavy equipment.  If you have weakness or tingling, don't drive or use heavy equipment until this feeling goes away.   3. Do not drink alcohol.   4. Avoid strenuous or risky activities.  Ask for help when climbing stairs.  5. You may feel lightheaded.  IF so, sit for a few minutes before standing.  Have someone help you get up.   6. If you have nausea (feel sick to your stomach): Drink only clear liquids such as apple juice, ginger ale, broth or 7-Up.  Rest may also help.  Be sure to drink enough fluids.  Move to a regular diet as you feel able.   7. You may have a slight fever.  Call the doctor if your fever is over 100 F (37.7 C) (taken under the tongue) or lasts longer than 24 hours.  8. You may have a dry mouth, a sore throat, muscle aches or trouble sleeping. These should go away after 24 hours.  9. Do not make important or legal decisions.               Tips for taking pain medications  To get the best pain relief possible, remember these points:    Take pain medications as directed, before pain becomes severe.    Pain medication can upset your stomach: taking it with food may help.    Constipation is a common side effect of pain medication. Drink plenty of  fluids.    Eat foods high in fiber. Take a stool softener if recommended by your doctor or pharmacist.    Do not drink alcohol, drive or operate machinery while taking pain medications.    Ask about other ways to control pain, such as with heat, ice or relaxation.    Tylenol/Acetaminophen Consumption  To help encourage the safe use of acetaminophen, the makers of TYLENOL  have lowered the maximum daily dose for single-ingredient Extra Strength TYLENOL  (acetaminophen) products sold in the [place] from 8  "pills per day (4,000 mg) to 6 pills per day (3,000 mg). The dosing interval has also changed from 2 pills every 4-6 hours to 2 pills every 6 hours.    If you feel your pain relief is insufficient, you may take Tylenol/Acetaminophen in addition to your narcotic pain medication.     Be careful not to exceed 3,000 mg of Tylenol/Acetaminophen in a 24 hour period from all sources.    If you are taking extra strength Tylenol/acetaminophen (500 mg), the maximum dose is 6 tablets in 24 hours.    If you are taking regular strength acetaminophen (325 mg), the maximum dose is 9 tablets in 24 hours.    Call a doctor for any of the followin. Signs of infection (fever, growing tenderness at the surgery site, a large amount of drainage or bleeding, severe pain, foul-smelling drainage, redness, swelling).  2. It has been over 8 to 10 hours since surgery and you are still not able to urinate (pass water).  3. Headache for over 24 hours.  4. Numbness, tingling or weakness the day after surgery (if you had spinal anesthesia).  Your doctor is:  {[employer].:[id number]::\"   \",\"Dr. Katerin Arizmendi, ENT Otolaryngology: [phone]\",\"Dr. Reece Whitt, General Surgery: [phone]\",\"Dr. Jose Luis Ewing, Prostate and Urology: [phone]\",\"Dr. Saul James, Anesthesia Pain Service: [phone]\",\"Dr. Ciera Cárdenas, Orthopaedics: [phone]\",\"Dr. Carmelo Avery, Gynecologic Oncology: [phone]\",\"Dr. Shahzad Billingsley, General Surgery: [phone]\",\"Dr. Ismael Bernstein, Prostate and Urology: [phone]\",\"Dr. Saul Ruiz, Prostate and Urology: [phone]\",\"Dr. Lefty Vallejo, Orthopaedics: [phone]\",\"Dr. Susanne Bocanegra, ENT Otolaryngology: [phone]\",\"Dr. Ramona Lee, Opthalmology: [phone]\",\"Dr. Gerber Talbot, Orthopaedics: [phone]\",\"Dr. Teetee Peters, General Surgery: [phone]\",\"Dr. Silvia Rushing, Plastic Surgery: [phone]\",\"Dr. Reece Thomason, General Surgery: " "[phone]\",\"Dr. Eddie Rodriguez, Plastic Surgery: [phone]\",\"Dr. Dejuan Joy, Orthopaedics: [phone]\",\"Dr. Jong Veliz, Orthopaedics: [phone]\",\"Dr. Veronica Sabillon, General Surgery: [phone]\",\"Dr. Yamil Cespedes, Gynecologic Oncology: [phone]\",\"Dr. John Cardenas, Prostate and Urology: [phone]\",\"Dr. Veronica Padilla, Gynecologic Oncology: [phone]\",\"Dr. Dolores Mohamud, ENT Otolaryngology: [phone]\",\"Dr. Raheem Khan, Gynecologic Oncology: [phone]\",\"Dr. Yecenia Novak, Prostate and Urology: [phone]\",\"Dr. Saul Harrison, Orthopaedics: [phone]\",\"Dr. Javier Judge, Colon Rectal: [phone]\",\"Dr. Teetee Lewis, Gynecologic Oncology: [phone]\",\"Dr. Fabiano Amezcua, ENT Otolaryngology: [phone]\",\"Dr. Girish Yoder, General Surgery: [phone]\",\"Dr. Eric Duron, Opthalmology: [phone]\",\"Dr. Cathryn Ziegler, Orthopaedics: [phone]\",\"Dr. Hernan Zieglre, Ophthalmology: [phone]\",\"Dr. Cliff Mercado, General Surgery: [phone]\",\"Dr. Ismael Xie, Ophthalmology: [phone]\",\"Dr. Ivet Esposito, ENT Otolaryngology: [phone]\",\"Dr. Claudia Otot, ENT Otolaryngology: [phone]\",\"Dr. Svetlana Romero, [facility]: [phone]\",\"Dr. Victor Manuel Fernandez, ENT Otolaryngology: [phone]\",\"Dr. Luz Rivers, Neurosurgery: [phone]\",\"Dr. Ramirez Wolff, ENT Otolaryngology: [phone]\",\"Dr. Edson Weeks, Plastic Surgery: [phone]\",\"Dr. Davide Winter, Prostate and Urology: [phone]\",\"Dr. Aylin Vasquez: Opthalmology: [phone]\",\"Dr. Lenora Anderson, Colon Rectal: [phone]\",\"Dr. Roddy Harrington, Neurosurgery: [phone]\",\"Dr. Raymon Sebastian, General Surgery: [phone]\",\"Dr. Shae Griffith, Otolaryngology: [phone]\"}  {[employer].:[id number]::\"   \",\"Dr. Reece Wilkerson, Orthopaedics: [phone]\",\"Dr. Kiko Canas, Colon Rectal: [phone]\",\"Dr. Danna Garcia, Ophthalmology: [phone]\",\"Dr. Sammy Green Orthopaedics: " "[phone]\",\"Dr. Tylor Berry, Ophthalmology: [phone]\",\"Dr. Dilia Parada, Colon Rectal: [phone]\",\"Dr. Marlene Atkins, ENT Otolaryngology: [phone]\",\"Dr. Carmelita Carlos, Orthopaedics: [phone]\",\"Dr. Barbie Ivey, Ophthalmology: [phone]\",\"Dr. Priya Tabares, Ophthalmology: [phone]\",\"Dr. Grant Reyes, Orthopaedics: [phone]\",\"Dr. Kiko Kramer DDS, Oral Surgery: [phone]\",\"Dr. Matti Vazquez, Prostate and Urology: [phone]\",\"Dr. Romero Lugo, Orthopaedics: [phone]\",\"Dr. Harry Galaviz, Orthopaedics: [phone]\",\"Dr. Ismael Park, Ophthalmology: [phone]\",\"Dr. Surinder Worthy, ENT Otolaryngology: [phone]\",\"Dr. Dolores Song, Prostate and Urology: [phone]\",\"Dr. Saul Vernon, Opthalmology: [phone]\",\"Dr. Natanael Finley, Prostate and Urology: [phone]\",\"Dr. Saul Acuna, Neurosurgery: [phone]\",\"Dr. Chris Jean Baptiste, Orthopaedics: [phone]\",\"Dr. Sneha Barger, Prostate and Urology: [phone]\",\"Dr. Ismael Robledo, Prostate and Urology: [phone]\",\"Dr. Jordy Lamas, Orthopaedics: [phone]\",\"Dr. Ashley Diez, Vascular Surgery: [phone]\",\"Dr. Everton Durham, Orthopaedics: [phone]\",\"Dr. Aristides Livingston, Breast Center: [phone]\",\"Dr. Lady Mejia, Orthopaedics: [phone]\",\"Dr. Sammy Ballard, Prostate and Urology: [phone]\",\"Dr. Sammy Levy, Prostate and Urology: [phone]\",\"Dr. Miles Castellanos, Gynecologic Oncology: [phone]\",\"Dr. Yulia Limon, Opthalmology: [phone]\",\"Dr. Karel Valle, Orthopaedics: [phone]\",\"Dr. Rey Combs, Otolaryngology: [phone]\"}               Or dial [phone] and ask for the resident on call for:  {[employer].:[id number]::\"Anesthesia Pain Service\",\"Breast Center\",\"Colon Rectal\",\"ENT Otolaryngology\",\"General Surgery\",\"Gynecologic Oncology\",\"Interventional Radiology\",\"Neurosurgery\",\"Ophthalmology\",\"Oral Maxillofacial\",\"Oral " "Surgery\",\"Orthopaedics\",\"Pain Service\",\"Plastics\",\"Prostate Urology\",\"Vascular\"}  For emergency care, call the:  {[employer].:[id number]::\"[facility]:  [phone] (TTY for hearing impaired: [phone])\",\"[facility]: [phone] (TTY for hearing impaired: [phone])\",\"[facility]: [phone]\"}            [facility]     Home Care Following Cataract Surgery    If you have a gauze eye patch on, please do not remove it until it is removed by your doctor at your first appointment after your surgery.  You will start your eye drops the next day.    OR    If you only have a clear eye shield on, you may remove the eye shield when you get home and begin eye drops today as directed by your doctor.      Wear the clear eye shield for protection when sleeping for the next 5 days.      Do not rub the eye that had the operation.      Your eye might be sensitive to light.  Wearing sunglasses may be more comfortable for you.      You may have some discomfort and irritation.  Acetaminophen (Tylenol) or Ibuprofen (Advil) may be taken for discomfort. If pain persists please call your doctor s office.      Keep the eye that had the surgery dry. You may wash your hair, bathe or shower, but keep your eye closed while doing so.       Avoid bending over, strenuous activity or heavy lifting until this activity has been approved by your doctor.      You have a follow-up appointment with your doctor tomorrow at the [facility] ([phone]).  Bring all your prescribed eye drops with you to this follow-up appointment.        If you take glaucoma medications, bring them with you to your follow-up appointment tomorrow.      Use medication exactly as prescribed by your doctor. You may restart your regular home medications.       Occasional blood-tinged tears are normal the day or two " after surgery. However, if there is large or persistent bleeding from the eye, that is not normal, and you should contact the clinic.      Call your doctor s office at [phone] if any of the following should occur:    - Any sudden vision changes, including decreased vision  - Nausea or severe headache  - Increase in pain that is not controlled with Acetaminophen (Tylenol) or Ibuprofen (Advil)  - Signs of infection (pus, increasing redness or tenderness)  - Severe sensitivity to light  - An increase in floaters (black spots in front of your vision)

## 2019-03-08 NOTE — ANESTHESIA POSTPROCEDURE EVALUATION
Anesthesia POST Procedure Evaluation    Patient: Valorie Hahn   MRN:     [id number] Gender:   female   Age:    63 year old :      1955        Preoperative Diagnosis: Visually Significant Cataract   Procedure(s):  Right Eye Cataract Removal with Intraocular Lens Implant   Postop Comments: No value filed.       Anesthesia Type:  MAC    Reportable Event: NO     PAIN: Uncomplicated   Sign Out status: Comfortable, Well controlled pain     PONV: No PONV   Sign Out status:  No Nausea or Vomiting     Neuro/Psych: Uneventful perioperative course   Sign Out Status: Preoperative baseline; Age appropriate mentation     Airway/Resp.: Uneventful perioperative course   Sign Out Status: Non labored breathing, age appropriate RR; Resp. Status within EXPECTED Parameters     CV: Uneventful perioperative course   Sign Out status: Appropriate BP and perfusion indices; Appropriate HR/Rhythm     Disposition:   Sign Out in:  PACU  Disposition:  Phase II; Home  Recovery Course: Uneventful  Follow-Up: Not required           Last Anesthesia Record Vitals:  CRNA VITALS  3/8/2019 0723 - 3/8/2019 0823      3/8/2019             Pulse:  59    SpO2:  95 %    Resp Rate (set):  10          Last PACU/Preop Vitals:  Vitals:    19 0649   BP: 119/75   Pulse: 72   Resp: 16   Temp: 36.6  C (97.8  F)   SpO2: 93%         Electronically Signed By: Benji Chance MD, 2019, 1:35 PM

## 2019-03-08 NOTE — PROGRESS NOTES
POD#0, status post cataract surgery, right eye    No complaints.  Denies eye pain.    Impression/Plan:  Pseudophakia, OD: POD0, good post-operative appearance. IOP reasonable.      Eye protection at all times and eye shield at night for 1 week.    Limited activities with no exercise or heavy lifting for 1 week.    Instructed patient to contact us for decreasing vision, eye pain, new floaters or flashes of light or other concerning symptoms.    Written instructions given    Return to clinic as scheduled 4/1/19 9:15AM.    Denise Carpio MD  Ophthalmology Resident, PGY-2    Teaching statement:  Complete documentation of historical and exam elements from today's encounter can be found in the full encounter summary report (not reduplicated in this progress note). I personally obtained the chief complaint(s) and history of present illness.  I confirmed and edited as necessary the review of systems, past medical/surgical history, family history, social history, and examination findings as documented by others; and I examined the patient myself. I personally reviewed the relevant tests, images, and reports as documented above.     I formulated and edited as necessary the assessment and plan and discussed the findings and management plan with the patient and family.    Danna Garcia MD  Comprehensive Ophthalmology & Ocular Pathology  Department of Ophthalmology and Visual Neurosciences  [email or web address]  Pager [phone]

## 2019-03-08 NOTE — ANESTHESIA CARE TRANSFER NOTE
Patient: Valorie Hahn    Procedure(s):  Right Eye Cataract Removal with Intraocular Lens Implant    Diagnosis: Visually Significant Cataract  Diagnosis Additional Information: No value filed.    Anesthesia Type:   No value filed.     Note:  Airway :Room Air  Patient transferred to:Phase II  Comments: 109/63  93%  97.8-61-16  Handoff Report: Identifed the Patient, Identified the Reponsible Provider, Reviewed the pertinent medical history, Discussed the surgical course, Reviewed Intra-OP anesthesia mangement and issues during anesthesia, Set expectations for post-procedure period and Allowed opportunity for questions and acknowledgement of understanding      Vitals: (Last set prior to Anesthesia Care Transfer)    CRNA VITALS  3/8/2019 0723 - 3/8/2019 0759      3/8/2019             Pulse:  59    SpO2:  95 %    Resp Rate (set):  10                Electronically Signed By: BC Rodriguez CRNA  March 8, 2019  7:59 AM

## 2019-03-08 NOTE — PROCEDURES
Ophthalmology Post-op Procedure Note    Surgical Service:    Ophthalmology & Visual Sciences  Date Performed:      March 8, 2019  Location: [facility]      Pre-operative Diagnosis: Visually significant cataract, right eye  Post-operative Diagnosis:  Pseudophakia, right eye  Operative Procedure:  Phacoemulsification with intraocular lens implantation, right eye      Surgeon(s):  Fellow/Staff Surgeon:       Danna Garcia MD  Resident Surgeon:            Denise Carpio MD    Anesthesia:   Topical/MAC  Findings:  No unusual findings   Blood Loss:    Minimal  Implants:  ZCB00 12.5 intraocular lens  Specimens:  None     Complications:  The patient did not experience any complications.   Condition: Stable    Operative Report Completion:    Description of Operation/Procedure:  After appropriate informed consent was obtained, the patient was brought to the operating room. The appropriate cardiac and blood pressure monitors were placed. A final pause occurred just before the start of the procedure during which the entire procedure team actively confirmed the correct patient, procedure, site, special equipment and special requirements. The patient was prepped and draped in the usual sterile fashion using 5% povidone/iodine.     An eyelid speculum was placed to open the eyelids. A paracentesis port was placed approximately sixty degrees to the left of the planned temporal incision location using the sideport blade. Approximately 0.8 cc of 1% nonpreserved lidocaine was placed into the anterior chamber. The anterior chamber was filled with dispersive viscoelastic. A clear corneal temporal incision was made with a metal 2.6 mm keratome. A round continuous tear capsulorhexis was initiated with a cystotome and completed with the Utrata forceps. Balanced salt solution on a cannula was used to perform hydrodissection. The nucleus was removed using phacoemulsification with a chop technique. The remaining cortical material was  removed using the irrigation/aspiration handpiece. The capsular bag was filled with dispersive viscoelastic. A lens of the  model and power listed above was placed into the capsular bag using the cartridge injection system. The remaining viscoelastic was removed using the irrigation aspiration handpiece. The paracentesis and temporal wounds were hydrated with balanced salt solution. Intracameral moxifloxacin was administered. At the conclusion of the case, the wounds were felt to be watertight, the pupil was round, the lens was centered, and the anterior chamber was deep. A few drops of antibiotic and prednisolone were given to the operative eye. The eyelid speculum was removed. A shield was placed over the operative eye.      Attending Attestation:  I was present for and performed the entire procedure.  Danna Garcia MD

## 2019-03-08 NOTE — NURSING NOTE
Chief Complaints and History of Present Illnesses   Patient presents with     Post Op (Ophthalmology) Right Eye     Right Eye Cataract Removal with Intraocular Lens Implant     Chief Complaint(s) and History of Present Illness(es)     Post Op (Ophthalmology) Right Eye     Laterality: right eye    Onset: hours ago    Quality: blurred vision    Severity: moderate    Frequency: constantly    Timing: throughout the day    Course: stable    Treatments tried: eye drops    Pain scale: 0/10    Comments: Right Eye Cataract Removal with Intraocular Lens Implant              Comments     Same day s/p Right Eye Cataract Removal with Intraocular Lens Implant. No eye pain today. Vision is blurry. Patient states they have their post op drops from the pharmacy.     Camila Bruce COT 11:38 AM March 8, 2019

## 2019-03-08 NOTE — ANESTHESIA PREPROCEDURE EVALUATION
Anesthesia Pre-Procedure Evaluation    Patient: Valorie Hahn   MRN:     [id number] Gender:   female   Age:    63 year old :      1955        Preoperative Diagnosis: Visually Significant Cataract   Procedure(s):  Left Eye Cataract Removal with Intraocular Lens Implant     Past Medical History:   Diagnosis Date     Arthritis     SI joint, osteoarthritis     Complication of anesthesia      Esophageal reflux      Genital herpes, unspecified      Mild intermittent asthma with exacerbation      PONV (postoperative nausea and vomiting)       Past Surgical History:   Procedure Laterality Date     C APPENDECTOMY       C NONSPECIFIC PROCEDURE      Anal fissure repair     COLONOSCOPY      some polyps removed     COLONOSCOPY WITH CO2 INSUFFLATION N/A 1/3/2019    Procedure: COLONOSCOPY WITH CO2 INSUFFLATION;  Surgeon: Arnold Adler MD;  Location: [facility] OR     HC TOOTH EXTRACTION W/FORCEP       PHACOEMULSIFICATION WITH STANDARD INTRAOCULAR LENS IMPLANT Left 3/1/2019    Procedure: Left Eye Cataract Removal with Intraocular Lens Implant;  Surgeon: Danna Garcia MD;  Location: [facility] OR          Anesthesia Evaluation     .             ROS/MED HX    ENT/Pulmonary:     (+)Intermittent asthma , . .    Neurologic:  - neg neurologic ROS    (-) seizures, CVA and TIA   Cardiovascular:  - neg cardiovascular ROS       METS/Exercise Tolerance:     Hematologic:  - neg hematologic  ROS       Musculoskeletal:  - neg musculoskeletal ROS       GI/Hepatic:  - neg GI/hepatic ROS       Renal/Genitourinary:  - ROS Renal section negative       Endo: Comment: BMI 32    (+) Obesity, .      Psychiatric:  - neg psychiatric ROS       Infectious Disease:  - neg infectious disease ROS       Malignancy:      - no malignancy   Other:                         PHYSICAL EXAM:   Mental Status/Neuro: A/A/O   Airway: Facies: Feasible  Mallampati: II  Mouth/Opening: Full  TM distance: > 6 cm  Neck ROM: Full   Respiratory:    CV:   "  Comments:                    Lab Results   Component Value Date    WBC 6.3 08/02/2017    HGB 13.8 08/02/2017    HCT 39.9 08/02/2017     08/02/2017    CRP <2.9 08/02/2017    SED 8 08/02/2017     08/02/2017    POTASSIUM 3.9 08/02/2017    CHLORIDE 104 08/02/2017    CO2 27 08/02/2017    BUN 11 08/02/2017    CR 0.60 08/02/2017    GLC 89 08/02/2017    TAMMI 9.2 08/02/2017    PHOS 3.1 08/23/2005    MAG 1.8 08/23/2005    ALBUMIN 4.0 08/02/2017    PROTTOTAL 7.2 08/02/2017    ALT 21 08/02/2017    AST 22 08/02/2017    ALKPHOS 69 08/02/2017    BILITOTAL 1.1 08/02/2017    TSH 3.08 08/10/2016       Preop Vitals  BP Readings from Last 3 Encounters:   03/08/19 119/75   03/01/19 110/63   03/01/19 107/70    Pulse Readings from Last 3 Encounters:   03/08/19 72   03/01/19 55   03/01/19 64      Resp Readings from Last 3 Encounters:   03/08/19 16   03/01/19 16   02/13/19 20    SpO2 Readings from Last 3 Encounters:   03/08/19 93%   03/01/19 97%   03/01/19 94%      Temp Readings from Last 1 Encounters:   03/08/19 36.6  C (97.8  F) (Oral)    Ht Readings from Last 1 Encounters:   03/08/19 1.613 m (5' 3.5\")      Wt Readings from Last 1 Encounters:   03/08/19 81.6 kg (180 lb)    Estimated body mass index is 31.39 kg/m  as calculated from the following:    Height as of an earlier encounter on 3/8/19: 1.613 m (5' 3.5\").    Weight as of an earlier encounter on 3/8/19: 81.6 kg (180 lb).     LDA:  Peripheral IV 03/08/19 Left Hand (Active)   Site Assessment WDL 3/8/2019  7:11 AM   Line Status Infusing 3/8/2019  7:11 AM   Phlebitis Scale 0-->no symptoms 3/8/2019  7:11 AM   Infiltration Scale 0 3/8/2019  7:11 AM   Infiltration Site Treatment Method  None 3/8/2019  7:11 AM   Extravasation? No 3/8/2019  7:11 AM   Dressing Intervention New dressing  3/8/2019  7:11 AM   Number of days: 0            Assessment:   ASA SCORE: 2    NPO Status: > 2 hours since completed Clear Liquids; > 6 hours since completed Solid Foods   Documentation: H&P " complete; Preop Testing complete; Consents complete   Proceeding: Proceed without further delay  Tobacco Use:  NO Active use of Tobacco/UNKNOWN Tobacco use status     Plan:   Anes. Type:  MAC   Pre-Induction: Midazolam IV   Induction:  Not applicable   Airway: Native Airway   Access/Monitoring: PIV   Maintenance: N/a   Emergence: N/a   Logistics: Same Day Surgery     Postop Pain/Sedation Strategy:  Standard-Options: Opioids PRN     PONV Management:  Adult Risk Factors: Female, H/o PONV or Motion Sickness, Non-Smoker, Postop Opioids     CONSENT: Direct conversation   Plan and risks discussed with: Patient          Comments for Plan/Consent:  Risks, benefits, and alternatives of MAC discussed with patient. They understand the risks including possible recall of events in the room. They understand there is a possibility that conversion to general anesthesia may be needed. Patient consents.                               Benji Chance MD

## 2019-03-26 ENCOUNTER — OFFICE VISIT (OUTPATIENT)
Dept: OPHTHALMOLOGY | Facility: CLINIC | Age: 64
End: 2019-03-26
Payer: COMMERCIAL

## 2019-03-26 DIAGNOSIS — Z96.1 PSEUDOPHAKIA, BOTH EYES: Primary | ICD-10-CM

## 2019-03-26 ASSESSMENT — TONOMETRY
OD_IOP_MMHG: 09
IOP_METHOD: TONOPEN
OS_IOP_MMHG: 10

## 2019-03-26 ASSESSMENT — CUP TO DISC RATIO
OD_RATIO: 0.25
OS_RATIO: 0.25

## 2019-03-26 ASSESSMENT — REFRACTION_MANIFEST
OS_AXIS: 147
OS_ADD: +2.50
OD_SPHERE: -1.50
OS_SPHERE: -0.75
OD_ADD: +2.50
OD_AXIS: 025
OD_CYLINDER: +0.25
OS_CYLINDER: +0.75

## 2019-03-26 ASSESSMENT — VISUAL ACUITY
OD_SC: 20/100-
METHOD: SNELLEN - LINEAR
OS_SC: 20/25-2
OD_SC: J1

## 2019-03-26 ASSESSMENT — SLIT LAMP EXAM - LIDS
COMMENTS: NORMAL
COMMENTS: NORMAL

## 2019-03-26 ASSESSMENT — EXTERNAL EXAM - RIGHT EYE: OD_EXAM: NORMAL

## 2019-03-26 NOTE — PROGRESS NOTES
HPI  Valorie Hahn is a 63 year old female here for follow-up after CE/IOL both eyes. She notes the left eye vision is good. The right eye vision is still a little blurred (this eye set for intermediate/minimono). No pain, redness, discharge. No flashes/floaters.    POH: S/p CE/IOL both eyes  PMH: Intermittent asthma  FH: Brother with strabismus and amblyopia s/p strabismus surgery as a child.      Assessment & Plan    (Z96.1) Pseudophakia, both eyes  (primary encounter diagnosis)  Comment: Good post-op appearance.  Plan: Given updated glasses Rx. Complete drop taper.     -----------------------------------------------------------------------------------    Patient disposition:   Return in about 1 year (around 3/26/2020). or sooner as needed.    Teaching statement:  Complete documentation of historical and exam elements from today's encounter can be found in the full encounter summary report (not reduplicated in this progress note). I personally obtained the chief complaint(s) and history of present illness.  I confirmed and edited as necessary the review of systems, past medical/surgical history, family history, social history, and examination findings as documented by others; and I examined the patient myself. I personally reviewed the relevant tests, images, and reports as documented above.     I formulated and edited as necessary the assessment and plan and discussed the findings and management plan with the patient and family.    Danna Garcia MD  Comprehensive Ophthalmology & Ocular Pathology  Department of Ophthalmology and Visual Neurosciences  [email or web address]  Pager [phone]

## 2019-03-27 ENCOUNTER — MYC REFILL (OUTPATIENT)
Dept: FAMILY MEDICINE | Facility: CLINIC | Age: 64
End: 2019-03-27

## 2019-03-27 DIAGNOSIS — J45.20 MILD INTERMITTENT ASTHMA WITHOUT COMPLICATION: ICD-10-CM

## 2019-03-28 RX ORDER — ALBUTEROL SULFATE 90 UG/1
2 AEROSOL, METERED RESPIRATORY (INHALATION) EVERY 4 HOURS PRN
Qty: 18 G | Refills: 1 | Status: SHIPPED | OUTPATIENT
Start: 2019-03-28 | End: 2019-05-07

## 2019-03-29 DIAGNOSIS — M53.3 SI (SACROILIAC) JOINT DYSFUNCTION: ICD-10-CM

## 2019-03-29 LAB
ALT SERPL W P-5'-P-CCNC: 24 U/L (ref 0–50)
AST SERPL W P-5'-P-CCNC: 15 U/L (ref 0–45)
CREAT SERPL-MCNC: 0.63 MG/DL (ref 0.52–1.04)
ERYTHROCYTE [DISTWIDTH] IN BLOOD BY AUTOMATED COUNT: 11.5 % (ref 10–15)
GFR SERPL CREATININE-BSD FRML MDRD: >90 ML/MIN/{1.73_M2}
HCT VFR BLD AUTO: 40.5 % (ref 35–47)
HGB BLD-MCNC: 13.8 G/DL (ref 11.7–15.7)
MCH RBC QN AUTO: 31.5 PG (ref 26.5–33)
MCHC RBC AUTO-ENTMCNC: 34.1 G/DL (ref 31.5–36.5)
MCV RBC AUTO: 93 FL (ref 78–100)
PLATELET # BLD AUTO: 252 10E9/L (ref 150–450)
RBC # BLD AUTO: 4.38 10E12/L (ref 3.8–5.2)
WBC # BLD AUTO: 5.3 10E9/L (ref 4–11)

## 2019-03-29 PROCEDURE — 82565 ASSAY OF CREATININE: CPT | Performed by: NURSE PRACTITIONER

## 2019-03-29 PROCEDURE — 36415 COLL VENOUS BLD VENIPUNCTURE: CPT | Performed by: NURSE PRACTITIONER

## 2019-03-29 PROCEDURE — 84460 ALANINE AMINO (ALT) (SGPT): CPT | Performed by: NURSE PRACTITIONER

## 2019-03-29 PROCEDURE — 84450 TRANSFERASE (AST) (SGOT): CPT | Performed by: NURSE PRACTITIONER

## 2019-03-29 PROCEDURE — 85027 COMPLETE CBC AUTOMATED: CPT | Performed by: NURSE PRACTITIONER

## 2019-04-02 NOTE — RESULT ENCOUNTER NOTE
Leandro Eugene,    This note is to let you know that your lab test results are all normal.    You are due to come into the clinic to see me for a physical and medication refills. Your last routine physical was 4/18/2018.     I hope to see you soon for an appointment.    Lisbeth YANCEY CNP

## 2019-04-05 ENCOUNTER — MYC MEDICAL ADVICE (OUTPATIENT)
Dept: OPHTHALMOLOGY | Facility: CLINIC | Age: 64
End: 2019-04-05

## 2019-04-11 ENCOUNTER — ANCILLARY PROCEDURE (OUTPATIENT)
Dept: GENERAL RADIOLOGY | Facility: CLINIC | Age: 64
End: 2019-04-11
Attending: FAMILY MEDICINE
Payer: COMMERCIAL

## 2019-04-11 ENCOUNTER — OFFICE VISIT (OUTPATIENT)
Dept: ORTHOPEDICS | Facility: CLINIC | Age: 64
End: 2019-04-11
Payer: COMMERCIAL

## 2019-04-11 VITALS
HEIGHT: 64 IN | DIASTOLIC BLOOD PRESSURE: 69 MMHG | TEMPERATURE: 97.8 F | SYSTOLIC BLOOD PRESSURE: 139 MMHG | BODY MASS INDEX: 31.92 KG/M2 | WEIGHT: 187 LBS

## 2019-04-11 DIAGNOSIS — M25.551 RIGHT HIP PAIN: ICD-10-CM

## 2019-04-11 DIAGNOSIS — M25.551 RIGHT HIP PAIN: Primary | ICD-10-CM

## 2019-04-11 ASSESSMENT — MIFFLIN-ST. JEOR: SCORE: 1385.29

## 2019-04-11 ASSESSMENT — PAIN SCALES - GENERAL: PAINLEVEL: EXTREME PAIN (8)

## 2019-04-11 NOTE — PROGRESS NOTES
NEW PATIENT INTAKE QUESTIONNAIRE  SPORTS & ORTHOPEDIC WALK-IN 4/11/2019    Primary Care Physician: Salena Spencer Dr.    Where are the majority of your medical records? [facility]    Reason for Visit:    What part of your body is injured / painful?  right hip    What caused the injury /pain? No inciting event     How long ago did your injury occur or pain begin? yesterday    What are your most bothersome symptoms? Pain    How would you characterize your symptom? Sharp, shooting and radiating    What makes your symptoms better? Ice    What makes your symptoms worse? Standing and Walking    Have you been previously seen for this problem? No    Medical History:    Medical History: see Chart    Have you had surgery on this body part before? No    Medications: see chart     Allergies: Yes: PCN     Family History of Medical Problems: heart disease, diabetes    Previous Surgeries: appedectomy    Social History:    Occupation: [employer]    Handedness: Right    Exercise: sometimes    Review of Systems:    Have you recently had a a fever, chills, weight loss? No    Do you have any vision problems?Yes,  Wear glasses and just had cataract surgery a month ago     Do you have any chest pain or edema? No    Do you have any shortness of breath or wheezing?  No    Do you have stomach problems? No    Do you have any numbness or focal weakness? No    Do you have diabetes? No    Do you have problems with bleeding or clotting? No    Do you have an rashes or other skin lesions? No    Communication:    How did you hear about us? Her partners    Who else should know about this visit? PCP, Nitesh Spencer Dr.

## 2019-04-11 NOTE — PROGRESS NOTES
[facility]  Orthopedics  John Velazquez MD  2019     Name: Valorie Hahn  MRN: [id number]  Age: 63 year old  : 1955  Referring provider: Referred Self     Chief Complaint: Pain of the Right Hip       Date of Injury: 4/10/19    History of Present Illness:   Valorie Hahn is a 63 year old female who presents today for evaluation of right hip pain. The patient reports yesterday she was turning on her right foot and experienced an onset of sharp right hip pain. Pain is localized over the posterior buttocks are with radiation around the lateral aspect of the right hip and shooting pain down the right thigh to the knee. She reports that weight bearing is difficult on the right leg. She has tried treating the pain with icing, stretches and Meloxicam but without significant relief. Denies numbness and tingling.    Review of Systems:   A 10-point review of systems was obtained and is negative except for as noted in the HPI.     Medications:     Current Outpatient Medications:      acyclovir (ZOVIRAX) 400 MG tablet, Take 1 tablet (400 mg) by mouth 2 times daily For 3 days with each outbreak., Disp: 6 tablet, Rfl: 11     albuterol (PROAIR HFA/PROVENTIL HFA/VENTOLIN HFA) 108 (90 Base) MCG/ACT inhaler, Inhale 2 puffs into the lungs every 4 hours as needed for shortness of breath / dyspnea, Disp: 18 g, Rfl: 1     budesonide-formoterol (SYMBICORT) 160-4.5 MCG/ACT Inhaler, Inhale 2 puffs into the lungs 2 times daily, Disp: 3 Inhaler, Rfl: 1     cetirizine (ZYRTEC) 5 MG CHEW, Take 5 mg by mouth daily, Disp: , Rfl:      Cholecalciferol (VITAMIN D3 PO), Take by mouth daily, Disp: , Rfl:      Ibuprofen (IBU PO), Take  by mouth., Disp: , Rfl:      meloxicam (MOBIC) 7.5 MG tablet, Take 1 tablet (7.5 mg) by mouth daily, Disp: 30 tablet, Rfl: 5     ofloxacin (OCUFLOX) 0.3 % ophthalmic solution, 1 drop 4x daily in the surgical eye for 1 week after surgery, then stop, Disp: 1 Bottle, Rfl: 0     ofloxacin (OCUFLOX)  "0.3 % ophthalmic solution, 1 drop 4x daily in the surgical eye for 1 week after surgery, then stop, Disp: 1 Bottle, Rfl: 0     prednisoLONE acetate (PRED FORTE) 1 % ophthalmic suspension, 1 drop in surgical eye as directed, 4x daily after surgery for 1 week, 3x daily for 1 week, 2x daily for 1 week, daily for 1 week, then stop, Disp: 1 Bottle, Rfl: 1     prednisoLONE acetate (PRED FORTE) 1 % ophthalmic suspension, 1 drop in surgical eye as directed, 4x daily after surgery for 1 week, 3x daily for 1 week, 2x daily for 1 week, daily for 1 week, then stop, Disp: 1 Bottle, Rfl: 1     triamcinolone (KENALOG) 0.1 % external cream, Apply topically 2 times daily, Disp: 454 g, Rfl: 11    Allergies:  Penicillin    Past Medical History:  Arthritis of the SI joint  Esophageal reflux  Genital herpes  Mild intermittent asthma    Past Surgical History:  Appendectomy  Anal fissure repair  Colonoscopy  Left eye cataract removal with intraocular lens implant  Right eye cataract removal with intraocular lens implant     Social History:  Patient lives alone.  Works as . She admits to tobacco use and admits to daily alcohol use.     Family History:  Emphysema - mother  Mental illness - mother  Thyroid disease - mother, sister  Glaucoma - mother  Cerebrovascular disease - mother  Diabetes, type 2 - brother  Skin cancer - father  Heart disease - father  Depression - mother, maternal grandmother, sister  HIV/AIDS - brother    Physical Examination:  Blood pressure 139/69, temperature 97.8  F (36.6  C), height 1.613 m (5' 3.5\"), weight 84.8 kg (187 lb), last menstrual period 02/14/2010, not currently breastfeeding.   General: Normal appearance, in no obvious distress.  Skin: No ecchymosis, erythema, warmth, induration, or obvious rash.  Psych: Interactive, appropriate, normal mood and affect.   Neuro: No motor deficit, grossly normal coordination, normal muscle tone.  Right Hip: PROM is symmetric but the patient has pain in " the lateral hip with passive internal rotation and adduction.Tender to palpation over the greater trochanter.  Devante's test positive.  Pain with abduction with resistance, 4+/5 strength.  Straight leg raise negative.  Able to walk on toes and heels without difficulty.  Sensation intact light touch throughout the lower extremities.    Imaging:   Radiographs of the right hip - 2-3 views (04/11/2019)  Impression: Mineralization projecting over the right greater  trochanteric region. This may represent hydroxyapatite deposition.    I have independently reviewed the above imaging studies; the results were discussed with the patient.     Assessment:   63 year old female with right gluteal tendon injury.  Less likely radicular pain.    Plan:   Discussed the diagnosis and treatment options with the patient.   She will continue with conservative treatment for comfort.  This will include use of crutches as well as continued use of meloxicam for pain control.  Recommended regular icing.  When comfortable she will begin home exercises.  She will call if she would like to consider follow-up with physical therapy.  She will follow-up in 1 month's time if she is not considerably improved by that point.    John Velazquez MD      Scribe Disclosure:   IMonique, am serving as a scribe to document services personally performed by John Velazquez MD at this visit, based upon the provider's statements to me. All documentation has been reviewed by the aforementioned provider prior to being entered into the official medical record.

## 2019-04-15 ENCOUNTER — OFFICE VISIT (OUTPATIENT)
Dept: OPHTHALMOLOGY | Facility: CLINIC | Age: 64
End: 2019-04-15
Attending: OPHTHALMOLOGY
Payer: COMMERCIAL

## 2019-04-15 DIAGNOSIS — H35.352 CME (CYSTOID MACULAR EDEMA), LEFT: Primary | ICD-10-CM

## 2019-04-15 DIAGNOSIS — H53.10 SUBJECTIVE VISUAL DISTURBANCE: ICD-10-CM

## 2019-04-15 PROCEDURE — 92134 CPTRZ OPH DX IMG PST SGM RTA: CPT | Mod: ZF | Performed by: STUDENT IN AN ORGANIZED HEALTH CARE EDUCATION/TRAINING PROGRAM

## 2019-04-15 PROCEDURE — G0463 HOSPITAL OUTPT CLINIC VISIT: HCPCS | Mod: ZF

## 2019-04-15 RX ORDER — KETOROLAC TROMETHAMINE 5 MG/ML
1 SOLUTION OPHTHALMIC 4 TIMES DAILY
Qty: 1 BOTTLE | Refills: 0 | Status: SHIPPED | OUTPATIENT
Start: 2019-04-15 | End: 2019-04-25

## 2019-04-15 RX ORDER — PREDNISOLONE ACETATE 10 MG/ML
1 SUSPENSION/ DROPS OPHTHALMIC 4 TIMES DAILY
Qty: 1 BOTTLE | Refills: 0 | Status: SHIPPED | OUTPATIENT
Start: 2019-04-15 | End: 2019-04-25

## 2019-04-15 ASSESSMENT — REFRACTION_WEARINGRX
OS_SPHERE: -0.50
SPECS_TYPE: PAL
OS_AXIS: 168
OS_ADD: +2.50
OS_CYLINDER: +1.00
OD_CYLINDER: +0.25
OD_AXIS: 025
OD_SPHERE: -1.50
OD_ADD: +2.50

## 2019-04-15 ASSESSMENT — REFRACTION_MANIFEST
OD_SPHERE: -1.50
OD_CYLINDER: +0.25
OS_CYLINDER: +0.75
OD_AXIS: 005
OS_AXIS: 135
OD_ADD: +2.50
OS_ADD: +2.50
OS_SPHERE: -0.50

## 2019-04-15 ASSESSMENT — VISUAL ACUITY
METHOD: SNELLEN - LINEAR
CORRECTION_TYPE: GLASSES
OD_CC: J1
OS_CC: J7
OS_CC: 20/50
OS_CC+: -2/+2
OD_CC: 20/25
OD_CC+: +2

## 2019-04-15 ASSESSMENT — CUP TO DISC RATIO
OS_RATIO: 0.25
OD_RATIO: 0.25

## 2019-04-15 ASSESSMENT — EXTERNAL EXAM - RIGHT EYE: OD_EXAM: NORMAL

## 2019-04-15 ASSESSMENT — SLIT LAMP EXAM - LIDS
COMMENTS: NORMAL
COMMENTS: NORMAL

## 2019-04-15 ASSESSMENT — CONF VISUAL FIELD
METHOD: COUNTING FINGERS
OD_NORMAL: 1
OS_NORMAL: 1

## 2019-04-15 ASSESSMENT — TONOMETRY
OS_IOP_MMHG: 13
OD_IOP_MMHG: 16
IOP_METHOD: TONOPEN

## 2019-04-15 NOTE — PROGRESS NOTES
HPI:  Valorie Hahn is a 63 year old female with nuclear sclerosis s/p CEIOL left eye 3/1/19 and right eye 3/8/19. She returns to clinic today reporting that vision is a little blurry left eye, wondering if glasses were prescribed too early. More blurry vision left eye over the past couple weeks not improved by glasses, stopped post operative drops right eye 10 days ago. Blurriness described as wavy. No eye pain or photophobia.     POH: S/p CE/IOL both eyes  PMH: Intermittent asthma  FH: Brother with strabismus and amblyopia s/p strabismus surgery as a child.    ASSESSMENT & PLAN:    Valorie Hahn is a 63 year old female with the following diagnoses:     # Cystoid macular edema left eye   Post-operative onset, operation on 3/1/19   OCT with cystoid macular edema left eye  Right eye operation followed left eye, will start drops right eye as prophylactic as well   Start ketorolac four times a day both eyes   Start prednisolone four times a day both eyes     # Pseudophakia both eyes   S/p CEIOL right eye 3/8/19 and left eye 3/1/19   Lenses centered and stable      # Refractive error both eyes   New glasses prescribed at last visit     Patient disposition: Return in about 1 month (around 5/13/2019) for Follow Up v/t and OCT macula .     Staff: MD Peter Garcia MD  Ophthalmology Resident, PGY-3  [facility]     Teaching statement:  Complete documentation of historical and exam elements from today's encounter can be found in the full encounter summary report (not reduplicated in this progress note). I personally obtained the chief complaint(s) and history of present illness.  I confirmed and edited as necessary the review of systems, past medical/surgical history, family history, social history, and examination findings as documented by others; and I examined the patient myself. I personally reviewed the relevant tests, images, and reports as documented above.     I formulated  and edited as necessary the assessment and plan and discussed the findings and management plan with the patient and family.    Danna Garcia MD  Comprehensive Ophthalmology & Ocular Pathology  Department of Ophthalmology and Visual Neurosciences  [email or web address]  Pager [phone]

## 2019-04-15 NOTE — NURSING NOTE
Chief Complaints and History of Present Illnesses   Patient presents with     Follow Up     Chief Complaint(s) and History of Present Illness(es)     Follow Up     Laterality: both eyes    Onset: 3 weeks ago    Quality: difficulty focusing    Frequency: intermittently    Timing: at random times    Context: driving    Treatments tried: artificial tears    Pain scale: 0/10              Comments     Wonders if it was too soon to get new glasses as VA with LE is blurry.  Doesn't want to get any more glasses in future if it's possible that VA could still change.  When a passenger notes that it takes awhile for RE to refocus with right head turn.  Courtney Fletcher COT 9:09 AM 04/15/2019

## 2019-04-25 ENCOUNTER — MYC MEDICAL ADVICE (OUTPATIENT)
Dept: OPHTHALMOLOGY | Facility: CLINIC | Age: 64
End: 2019-04-25

## 2019-04-25 DIAGNOSIS — H35.352 CME (CYSTOID MACULAR EDEMA), LEFT: ICD-10-CM

## 2019-04-25 RX ORDER — KETOROLAC TROMETHAMINE 5 MG/ML
1 SOLUTION OPHTHALMIC 4 TIMES DAILY
Qty: 1 BOTTLE | Refills: 0 | Status: SHIPPED | OUTPATIENT
Start: 2019-04-25 | End: 2019-05-13

## 2019-04-25 RX ORDER — PREDNISOLONE ACETATE 10 MG/ML
1 SUSPENSION/ DROPS OPHTHALMIC 4 TIMES DAILY
Qty: 1 BOTTLE | Refills: 0 | Status: SHIPPED | OUTPATIENT
Start: 2019-04-25 | End: 2019-05-06

## 2019-05-07 ENCOUNTER — OFFICE VISIT (OUTPATIENT)
Dept: FAMILY MEDICINE | Facility: CLINIC | Age: 64
End: 2019-05-07
Payer: COMMERCIAL

## 2019-05-07 VITALS
RESPIRATION RATE: 16 BRPM | SYSTOLIC BLOOD PRESSURE: 116 MMHG | TEMPERATURE: 98.3 F | DIASTOLIC BLOOD PRESSURE: 71 MMHG | HEART RATE: 64 BPM | OXYGEN SATURATION: 95 % | BODY MASS INDEX: 31.76 KG/M2 | HEIGHT: 64 IN | WEIGHT: 186 LBS

## 2019-05-07 DIAGNOSIS — A60.04 HERPES SIMPLEX VULVOVAGINITIS: ICD-10-CM

## 2019-05-07 DIAGNOSIS — M53.3 SI (SACROILIAC) JOINT DYSFUNCTION: ICD-10-CM

## 2019-05-07 DIAGNOSIS — J45.20 MILD INTERMITTENT ASTHMA WITHOUT COMPLICATION: ICD-10-CM

## 2019-05-07 DIAGNOSIS — Z00.00 ROUTINE GENERAL MEDICAL EXAMINATION AT A HEALTH CARE FACILITY: Primary | ICD-10-CM

## 2019-05-07 LAB
ANION GAP SERPL CALCULATED.3IONS-SCNC: 11 MMOL/L (ref 3–14)
BUN SERPL-MCNC: 15 MG/DL (ref 7–30)
CALCIUM SERPL-MCNC: 9.4 MG/DL (ref 8.5–10.1)
CHLORIDE SERPL-SCNC: 108 MMOL/L (ref 94–109)
CO2 SERPL-SCNC: 21 MMOL/L (ref 20–32)
CREAT SERPL-MCNC: 0.76 MG/DL (ref 0.52–1.04)
GFR SERPL CREATININE-BSD FRML MDRD: 83 ML/MIN/{1.73_M2}
GLUCOSE SERPL-MCNC: 89 MG/DL (ref 70–99)
HGB BLD-MCNC: 13.9 G/DL (ref 11.7–15.7)
POTASSIUM SERPL-SCNC: 4.2 MMOL/L (ref 3.4–5.3)
SODIUM SERPL-SCNC: 140 MMOL/L (ref 133–144)
T4 FREE SERPL-MCNC: 0.96 NG/DL (ref 0.76–1.46)
TSH SERPL DL<=0.005 MIU/L-ACNC: 4.02 MU/L (ref 0.4–4)

## 2019-05-07 PROCEDURE — 85018 HEMOGLOBIN: CPT | Performed by: NURSE PRACTITIONER

## 2019-05-07 PROCEDURE — 99213 OFFICE O/P EST LOW 20 MIN: CPT | Mod: 25 | Performed by: NURSE PRACTITIONER

## 2019-05-07 PROCEDURE — 99396 PREV VISIT EST AGE 40-64: CPT | Performed by: NURSE PRACTITIONER

## 2019-05-07 PROCEDURE — 84439 ASSAY OF FREE THYROXINE: CPT | Performed by: NURSE PRACTITIONER

## 2019-05-07 PROCEDURE — 84443 ASSAY THYROID STIM HORMONE: CPT | Performed by: NURSE PRACTITIONER

## 2019-05-07 PROCEDURE — 36415 COLL VENOUS BLD VENIPUNCTURE: CPT | Performed by: NURSE PRACTITIONER

## 2019-05-07 PROCEDURE — 80048 BASIC METABOLIC PNL TOTAL CA: CPT | Performed by: NURSE PRACTITIONER

## 2019-05-07 RX ORDER — BUDESONIDE AND FORMOTEROL FUMARATE DIHYDRATE 160; 4.5 UG/1; UG/1
2 AEROSOL RESPIRATORY (INHALATION) 2 TIMES DAILY
Qty: 3 INHALER | Refills: 1 | Status: SHIPPED | OUTPATIENT
Start: 2019-05-07 | End: 2019-08-05

## 2019-05-07 RX ORDER — ACYCLOVIR 400 MG/1
400 TABLET ORAL 2 TIMES DAILY
Qty: 6 TABLET | Refills: 11 | Status: SHIPPED | OUTPATIENT
Start: 2019-05-07 | End: 2020-09-23

## 2019-05-07 RX ORDER — MELOXICAM 7.5 MG/1
7.5 TABLET ORAL DAILY
Qty: 30 TABLET | Refills: 11 | Status: SHIPPED | OUTPATIENT
Start: 2019-05-07 | End: 2020-09-23

## 2019-05-07 RX ORDER — ALBUTEROL SULFATE 90 UG/1
1-2 AEROSOL, METERED RESPIRATORY (INHALATION) EVERY 4 HOURS PRN
Qty: 18 G | Refills: 3 | Status: SHIPPED | OUTPATIENT
Start: 2019-05-07 | End: 2019-08-05

## 2019-05-07 ASSESSMENT — ANXIETY QUESTIONNAIRES
2. NOT BEING ABLE TO STOP OR CONTROL WORRYING: NOT AT ALL
GAD7 TOTAL SCORE: 0
7. FEELING AFRAID AS IF SOMETHING AWFUL MIGHT HAPPEN: NOT AT ALL
3. WORRYING TOO MUCH ABOUT DIFFERENT THINGS: NOT AT ALL
5. BEING SO RESTLESS THAT IT IS HARD TO SIT STILL: NOT AT ALL
1. FEELING NERVOUS, ANXIOUS, OR ON EDGE: NOT AT ALL
6. BECOMING EASILY ANNOYED OR IRRITABLE: NOT AT ALL

## 2019-05-07 ASSESSMENT — ENCOUNTER SYMPTOMS
HEARTBURN: 0
COUGH: 0
DIARRHEA: 0
HEADACHES: 0
DYSURIA: 0
PALPITATIONS: 0
JOINT SWELLING: 0
WEAKNESS: 0
ABDOMINAL PAIN: 0
PARESTHESIAS: 0
NERVOUS/ANXIOUS: 0
SORE THROAT: 0
DIZZINESS: 0
SHORTNESS OF BREATH: 0
NAUSEA: 0
BREAST MASS: 0
FEVER: 0
HEMATOCHEZIA: 0
FREQUENCY: 0
CHILLS: 0
ARTHRALGIAS: 0
CONSTIPATION: 0
MYALGIAS: 0
HEMATURIA: 0
EYE PAIN: 0

## 2019-05-07 ASSESSMENT — PATIENT HEALTH QUESTIONNAIRE - PHQ9: 5. POOR APPETITE OR OVEREATING: NOT AT ALL

## 2019-05-07 ASSESSMENT — MIFFLIN-ST. JEOR: SCORE: 1380.75

## 2019-05-07 NOTE — PROGRESS NOTES
SUBJECTIVE:   CC: Valorie Hahn is an 63 year old woman who presents for preventive health visit.     Healthy Habits:     Getting at least 3 servings of Calcium per day:  Yes    Bi-annual eye exam:  Yes    Dental care twice a year:  Yes    Sleep apnea or symptoms of sleep apnea:  None    Diet:  Regular (no restrictions)    Frequency of exercise:  2-3 days/week    Duration of exercise:  30-45 minutes    Taking medications regularly:  Yes    Medication side effects:  None    PHQ-2 Total Score: 0    Additional concerns today:  No      Today's PHQ-2 Score:   PHQ-2 (  [employer]) 2019   Q1: Little interest or pleasure in doing things 0   Q2: Feeling down, depressed or hopeless 0   PHQ-2 Score 0   Q1: Little interest or pleasure in doing things Not at all   Q2: Feeling down, depressed or hopeless Not at all   PHQ-2 Score 0       Abuse: Current or Past(Physical, Sexual or Emotional)- No  Do you feel safe in your environment? Yes    Social History     Tobacco Use     Smoking status: Former Smoker     Packs/day: 0.50     Years: 10.00     Pack years: 5.00     Types: Cigarettes     Start date: 1990     Last attempt to quit: 2000     Years since quittin.9     Smokeless tobacco: Never Used   Substance Use Topics     Alcohol use: Yes     Comment: 1-2 glasses of wine per night     If you drink alcohol do you typically have >3 drinks per day or >7 drinks per week? No    Alcohol Use 2019   Prescreen: >3 drinks/day or >7 drinks/week? No   Prescreen: >3 drinks/day or >7 drinks/week? -   No flowsheet data found.    Reviewed orders with patient.  Reviewed health maintenance and updated orders accordingly - Yes  Labs reviewed in EPIC  BP Readings from Last 3 Encounters:   19 116/71   19 139/69   19 119/75    Wt Readings from Last 3 Encounters:   19 84.4 kg (186 lb)   19 84.8 kg (187 lb)   19 81.6 kg (180 lb)                  Patient Active Problem List   Diagnosis      Herpes simplex vulvovaginitis     Mild intermittent asthma without complication     CARDIOVASCULAR SCREENING; LDL GOAL LESS THAN 160     Family history of thyroid disease     Advanced directives, counseling/discussion     SI (sacroiliac) joint dysfunction     Osteitis, condensans     Sacroiliac joint disease     SI (sacroiliac) pain     Colon polyps     Chronic low back pain     Left hip pain     Dyspnea     Pain in joint involving ankle and foot, right     Cataract of both eyes, unspecified cataract type     Past Surgical History:   Procedure Laterality Date     C APPENDECTOMY       C NONSPECIFIC PROCEDURE      Anal fissure repair     COLONOSCOPY      some polyps removed     COLONOSCOPY WITH CO2 INSUFFLATION N/A 1/3/2019    Procedure: COLONOSCOPY WITH CO2 INSUFFLATION;  Surgeon: Arnold Adler MD;  Location: [facility] OR     HC TOOTH EXTRACTION W/FORCEP       PHACOEMULSIFICATION WITH STANDARD INTRAOCULAR LENS IMPLANT Left 3/1/2019    Procedure: Left Eye Cataract Removal with Intraocular Lens Implant;  Surgeon: Danna Garcia MD;  Location: [facility] OR     PHACOEMULSIFICATION WITH STANDARD INTRAOCULAR LENS IMPLANT Right 3/8/2019    Procedure: Right Eye Cataract Removal with Intraocular Lens Implant;  Surgeon: Danna Garcia MD;  Location: [facility] OR       Social History     Tobacco Use     Smoking status: Former Smoker     Packs/day: 0.50     Years: 10.00     Pack years: 5.00     Types: Cigarettes     Start date: 1990     Last attempt to quit: 2000     Years since quittin.9     Smokeless tobacco: Never Used   Substance Use Topics     Alcohol use: Yes     Comment: 1-2 glasses of wine per night     Family History   Problem Relation Age of Onset     Respiratory Mother         emphysema     Mental Illness Mother      Thyroid Disease Mother      Thyroid Disease Mother         hypothyroid, takes supplement     Cerebrovascular Disease Mother      Depression Mother      Mental Illness Mother      Glaucoma  Mother      Mental Illness Maternal Grandmother      Depression Maternal Grandmother      Diabetes Father         type 2     Heart Disease Father         bypass surgeries x 2     Other Cancer Father         non hodgkins     Skin Cancer Father      Thyroid Disease Sister         hypothyroid     Thyroid Disease Sister         hypothyroid     Psychotic Disorder Sister         commited suicide, depression     Cancer Brother         esophogeal cancer, work exposure to chemicals     Blood Disease Brother          hiv  aids     Blood Disease Brother         hep c     Blood Disease Brother         hiv positive, passed away     Family History Negative Brother      Diabetes Brother      Mental Illness Sister      Thyroid Disease Sister      Thyroid Disease Sister      Diabetes Brother         type 2     Depression Sister      Depression Sister      Depression Brother      Melanoma No family hx of      Macular Degeneration No family hx of          Current Outpatient Medications   Medication Sig Dispense Refill     acyclovir (ZOVIRAX) 400 MG tablet Take 1 tablet (400 mg) by mouth 2 times daily For 3 days with each outbreak. 6 tablet 11     albuterol (PROAIR HFA/PROVENTIL HFA/VENTOLIN HFA) 108 (90 Base) MCG/ACT inhaler Inhale 2 puffs into the lungs every 4 hours as needed for shortness of breath / dyspnea 18 g 1     budesonide-formoterol (SYMBICORT) 160-4.5 MCG/ACT Inhaler Inhale 2 puffs into the lungs 2 times daily 3 Inhaler 1     cetirizine (ZYRTEC) 5 MG CHEW Take 5 mg by mouth daily       Cholecalciferol (VITAMIN D3 PO) Take by mouth daily       Ibuprofen (IBU PO) Take  by mouth.       ketorolac (ACULAR) 0.5 % ophthalmic solution Place 1 drop into both eyes 4 times daily 1 Bottle 0     meloxicam (MOBIC) 7.5 MG tablet Take 1 tablet (7.5 mg) by mouth daily 30 tablet 5     prednisoLONE acetate (PRED FORTE) 1 % ophthalmic suspension Place 1 drop Into the left eye 4 times daily 1 Bottle 0     triamcinolone (KENALOG) 0.1 %  external cream Apply topically 2 times daily (Patient not taking: Reported on 5/7/2019) 454 g 11     Allergies   Allergen Reactions     Penicillin G Hives     Recent Labs   Lab Test 03/29/19  0905 08/02/17  1502 08/10/16  1106 06/16/15  1010  05/14/13  1217   LDL  --   --  123* 96  --  96   HDL  --   --  69 67  --  68   TRIG  --   --  114 125  --  95   ALT 24 21  --   --   --   --    CR 0.63 0.60  --  0.67   < >  --    GFRESTIMATED >90 >90  --  >90  Non  GFR Calc     < >  --    GFRESTBLACK >90 >90  --  >90  African American GFR Calc     < >  --    POTASSIUM  --  3.9  --  4.2  --   --    TSH  --   --  3.08 2.68   < >  --     < > = values in this interval not displayed.        Mammogram Screening: Patient over age 50, mutual decision to screen reflected in health maintenance.    Pertinent mammograms are reviewed under the imaging tab.  History of abnormal Pap smear:   Last 3 Pap and HPV Results:   PAP / HPV Latest Ref Rng & Units 8/10/2016 5/31/2011 6/3/2008   PAP - OTHER-NIL, See Result NIL OTHER-NIL EM>40   HPV 16 DNA NEG Negative - -   HPV 18 DNA NEG Negative - -   OTHER HR HPV NEG Negative - -     PAP / HPV Latest Ref Rng & Units 8/10/2016 5/31/2011 6/3/2008   PAP - OTHER-NIL, See Result NIL OTHER-NIL EM>40   HPV 16 DNA NEG Negative - -   HPV 18 DNA NEG Negative - -   OTHER HR HPV NEG Negative - -     Reviewed and updated as needed this visit by clinical staff  Tobacco  Allergies  Meds  Med Hx  Surg Hx  Fam Hx  Soc Hx        Reviewed and updated as needed this visit by Provider          Asthma:  Better in the winter.  Worse again now because of spring allergies.  No oral antihistamines now because of her eye issues.  She is requesting to restart her symbicort and refills of her inhalers.    Cataract surgery:  Did not go well.  Going to opth.    Mobic:  For joint pain.  Works well.  She uses it as needed.  Requesting refills.    Genital herpes:  Rare outbreaks.  Acyclovir helps.      Review of  "Systems   Constitutional: Negative for chills and fever.   HENT: Negative for congestion, ear pain, hearing loss and sore throat.    Eyes: Negative for pain and visual disturbance.   Respiratory: Negative for cough and shortness of breath.    Cardiovascular: Negative for chest pain, palpitations and peripheral edema.   Gastrointestinal: Negative for abdominal pain, constipation, diarrhea, heartburn, hematochezia and nausea.   Breasts:  Negative for tenderness, breast mass and discharge.   Genitourinary: Negative for dysuria, frequency, genital sores, hematuria, pelvic pain, urgency, vaginal bleeding and vaginal discharge.   Musculoskeletal: Negative for arthralgias, joint swelling and myalgias.   Skin: Negative for rash.   Neurological: Negative for dizziness, weakness, headaches and paresthesias.   Psychiatric/Behavioral: Negative for mood changes. The patient is not nervous/anxious.         OBJECTIVE:   /71 (BP Location: Right arm, Patient Position: Sitting, Cuff Size: Adult Large)   Pulse 64   Temp 98.3  F (36.8  C) (Oral)   Resp 16   Ht 1.613 m (5' 3.5\")   Wt 84.4 kg (186 lb)   LMP 02/14/2010   SpO2 95%   BMI 32.43 kg/m    Physical Exam  GENERAL: healthy, alert and no distress  EYES: Eyes grossly normal to inspection, PERRL and conjunctivae and sclerae normal  HENT: ear canals and TM's normal, nose and mouth without ulcers or lesions  NECK: no adenopathy, no asymmetry, masses, or scars and thyroid normal to palpation  RESP: lungs clear to auscultation - no rales, rhonchi or wheezes  CV: regular rate and rhythm, normal S1 S2, no S3 or S4, no murmur, click or rub, no peripheral edema and peripheral pulses strong  ABDOMEN: soft, nontender, no hepatosplenomegaly, no masses and bowel sounds normal  MS: no gross musculoskeletal defects noted, no edema  SKIN: no suspicious lesions or rashes  NEURO: Normal strength and tone, mentation intact and speech normal  PSYCH: mentation appears normal, affect " "normal/bright    Diagnostic Test Results:  Results pending    ASSESSMENT/PLAN:   (Z00.00) Routine general medical examination at a health care facility  (primary encounter diagnosis)  Comment:   Plan: Basic metabolic panel, Hemoglobin, TSH with         free T4 reflex            (J45.20) Mild intermittent asthma without complication  Comment: controlled but currently worse  Plan: budesonide-formoterol (SYMBICORT) 160-4.5         MCG/ACT Inhaler, albuterol (PROAIR         HFA/PROVENTIL HFA/VENTOLIN HFA) 108 (90 Base)         MCG/ACT inhaler        She will start her Symbicort regularly for now we will plan to use it regularly during allergy season.  Also encouraged her to take her Zyrtec daily/oral antihistamine but she states she cannot start that until her eyes are improved.  She is to follow-up with me as needed, otherwise anticipate her symptoms to stay controlled.  When to recheck her in 1 year with a face-to-face appointment I did tell her to expect an ACT in 6 months.  She agrees and understands and will complete the CT when she gets up.    (M53.3) SI (sacroiliac) joint dysfunction  Comment: History of  Plan: meloxicam (MOBIC) 7.5 MG tablet, Basic         metabolic panel        Refills given.  She will continue take medication as needed.  She will continue to do joint stretching and range of motion.  She will follow-up with me yearly, sooner as needed.    (A60.04) Herpes simplex vulvovaginitis  Comment: History of/controlled  Plan: acyclovir (ZOVIRAX) 400 MG tablet, Basic         metabolic panel        Refills given.      COUNSELING:  Reviewed preventive health counseling, as reflected in patient instructions    BP Readings from Last 1 Encounters:   05/07/19 116/71     Estimated body mass index is 32.43 kg/m  as calculated from the following:    Height as of this encounter: 1.613 m (5' 3.5\").    Weight as of this encounter: 84.4 kg (186 lb).      Weight management plan: Discussed healthy diet and exercise " guidelines     reports that she quit smoking about 18 years ago. Her smoking use included cigarettes. She started smoking about 29 years ago. She has a 5.00 pack-year smoking history. She has never used smokeless tobacco.      Counseling Resources:  ATP IV Guidelines  Pooled Cohorts Equation Calculator  Breast Cancer Risk Calculator  FRAX Risk Assessment  ICSI Preventive Guidelines  Dietary Guidelines for Americans, 2010  [employer]'s MyPlate  ASA Prophylaxis  Lung CA Screening    BC Cuevas CNP  [facility]

## 2019-05-07 NOTE — LETTER
My Asthma Action Plan  Name: Valorie Hahn   YOB: 1955  Date: 5/7/2019   My doctor: BC Cuevas CNP   My clinic: [facility]        My Control Medicine: Budesonide + formoterol (Symbicort) -  160/4.5 mcg BID  My Rescue Medicine: Albuterol (Proair/Ventolin/Proventil) inhaler prn   My Asthma Severity: intermittent  Avoid your asthma triggers: pollens               GREEN ZONE   Good Control    I feel good    No cough or wheeze    Can work, sleep and play without asthma symptoms       Take your asthma control medicine every day.     1. If exercise triggers your asthma, take your rescue medication    15 minutes before exercise or sports, and    During exercise if you have asthma symptoms  2. Spacer to use with inhaler: If you have a spacer, make sure to use it with your inhaler             YELLOW ZONE Getting Worse  I have ANY of these:    I do not feel good    Cough or wheeze    Chest feels tight    Wake up at night   1. Keep taking your Green Zone medications  2. Start taking your rescue medicine:    every 20 minutes for up to 1 hour. Then every 4 hours for 24-48 hours.  3. If you stay in the Yellow Zone for more than 12-24 hours, contact your doctor.  4. If you do not return to the Green Zone in 12-24 hours or you get worse, start taking your oral steroid medicine if prescribed by your provider.           RED ZONE Medical Alert - Get Help  I have ANY of these:    I feel awful    Medicine is not helping    Breathing getting harder    Trouble walking or talking    Nose opens wide to breathe       1. Take your rescue medicine NOW  2. If your provider has prescribed an oral steroid medicine, start taking it NOW  3. Call your doctor NOW  4. If you are still in the Red Zone after 20 minutes and you have not reached your doctor:    Take your rescue medicine again and    Call 911 or go to the emergency room right away    See your regular doctor within 2 weeks of an  Emergency Room or Urgent Care visit for follow-up treatment.          Annual Reminders:  Meet with Asthma Educator,  Flu Shot in the Fall, consider Pneumonia Vaccination for patients with asthma (aged 19 and older).    Pharmacy:    [facility] [address] [place], [place] - [address] AT [facility] & [address]  [facility] [address] [place], [place] - [address] AT [facility] [address]                      Asthma Triggers  How To Control Things That Make Your Asthma Worse    Triggers are things that make your asthma worse.  Look at the list below to help you find your triggers and what you can do about them.  You can help prevent asthma flare-ups by staying away from your triggers.      Trigger                                                          What you can do   Cigarette Smoke  Tobacco smoke can make asthma worse. Do not allow smoking in your home, car or around you.  Be sure no one smokes at a child s day care or school.  If you smoke, ask your health care provider for ways to help you quit.  Ask family members to quit too.  Ask your health care provider for a referral to Quit Plan to help you quit smoking, or call 8-565-069-PLAN.     Colds, Flu, Bronchitis  These are common triggers of asthma. Wash your hands often.  Don t touch your eyes, nose or mouth.  Get a flu shot every year.     Dust Mites  These are tiny bugs that live in cloth or carpet. They are too small to see. Wash sheets and blankets in hot water every week.   Encase pillows and mattress in dust mite proof covers.  Avoid having carpet if you can. If you have carpet, vacuum weekly.   Use a dust mask and HEPA vacuum.   Pollen and Outdoor Mold  Some people are allergic to trees, grass, or weed pollen, or molds. Try to keep your windows closed.  Limit time out doors when pollen count is high.   Ask you health care provider about taking medicine during allergy season.     Animal Dander  Some people are allergic to skin  flakes, urine or saliva from pets with fur or feathers. Keep pets with fur or feathers out of your home.    If you can t keep the pet outdoors, then keep the pet out of your bedroom.  Keep the bedroom door closed.  Keep pets off cloth furniture and away from stuffed toys.     Mice, Rats, and Cockroaches  Some people are allergic to the waste from these pests.   Cover food and garbage.  Clean up spills and food crumbs.  Store grease in the refrigerator.   Keep food out of the bedroom.   Indoor Mold  This can be a trigger if your home has high moisture. Fix leaking faucets, pipes, or other sources of water.   Clean moldy surfaces.  Dehumidify basement if it is damp and smelly.   Smoke, Strong Odors, and Sprays  These can reduce air quality. Stay away from strong odors and sprays, such as perfume, powder, hair spray, paints, smoke incense, paint, cleaning products, candles and new carpet.   Exercise or Sports  Some people with asthma have this trigger. Be active!  Ask your doctor about taking medicine before sports or exercise to prevent symptoms.    Warm up for 5-10 minutes before and after sports or exercise.     Other Triggers of Asthma  Cold air:  Cover your nose and mouth with a scarf.  Sometimes laughing or crying can be a trigger.  Some medicines and food can trigger asthma.

## 2019-05-08 ASSESSMENT — ANXIETY QUESTIONNAIRES: GAD7 TOTAL SCORE: 0

## 2019-05-08 NOTE — RESULT ENCOUNTER NOTE
Leandro Eugene,    This note is to let you know the results of your recent lab studies.  Your hemoglobin, electrolyes, blood sugar, kidney function studies and calcium all came back normal.    On your thyroid blood test, your TSH, or thyroid-stimulating hormone, came back relying.  I am not worried about this and this does not indicate treatment at this time.  I will plan to recheck your thyroid level in 1 year, the let me know if you want to recheck it sooner.    Lisbeth YANCEY CNP

## 2019-05-13 ENCOUNTER — OFFICE VISIT (OUTPATIENT)
Dept: OPHTHALMOLOGY | Facility: CLINIC | Age: 64
End: 2019-05-13
Attending: OPHTHALMOLOGY
Payer: COMMERCIAL

## 2019-05-13 ENCOUNTER — ANCILLARY PROCEDURE (OUTPATIENT)
Dept: MAMMOGRAPHY | Facility: CLINIC | Age: 64
End: 2019-05-13
Attending: NURSE PRACTITIONER
Payer: COMMERCIAL

## 2019-05-13 DIAGNOSIS — A60.04 HERPES SIMPLEX VULVOVAGINITIS: ICD-10-CM

## 2019-05-13 DIAGNOSIS — Z00.00 PREVENTATIVE HEALTH CARE: ICD-10-CM

## 2019-05-13 DIAGNOSIS — H35.352 CME (CYSTOID MACULAR EDEMA), LEFT: Primary | ICD-10-CM

## 2019-05-13 PROCEDURE — 77067 SCR MAMMO BI INCL CAD: CPT

## 2019-05-13 PROCEDURE — 92134 CPTRZ OPH DX IMG PST SGM RTA: CPT | Mod: ZF | Performed by: STUDENT IN AN ORGANIZED HEALTH CARE EDUCATION/TRAINING PROGRAM

## 2019-05-13 PROCEDURE — G0463 HOSPITAL OUTPT CLINIC VISIT: HCPCS | Mod: ZF

## 2019-05-13 ASSESSMENT — REFRACTION_WEARINGRX
OD_ADD: +2.50
OS_ADD: +2.50
SPECS_TYPE: PAL
OS_CYLINDER: +1.00
OD_SPHERE: -1.50
OS_SPHERE: -0.50
OD_CYLINDER: +0.25
OD_AXIS: 025
OS_AXIS: 168

## 2019-05-13 ASSESSMENT — CONF VISUAL FIELD
OS_NORMAL: 1
METHOD: COUNTING FINGERS
OD_NORMAL: 1

## 2019-05-13 ASSESSMENT — VISUAL ACUITY
OD_CC: 20/25
CORRECTION_TYPE: GLASSES
METHOD: SNELLEN - LINEAR
OS_CC+: -1
OS_CC: 20/20

## 2019-05-13 ASSESSMENT — CUP TO DISC RATIO
OD_RATIO: 0.25
OS_RATIO: 0.25

## 2019-05-13 ASSESSMENT — EXTERNAL EXAM - RIGHT EYE: OD_EXAM: NORMAL

## 2019-05-13 ASSESSMENT — SLIT LAMP EXAM - LIDS
COMMENTS: NORMAL
COMMENTS: NORMAL

## 2019-05-13 ASSESSMENT — TONOMETRY
OD_IOP_MMHG: 16
OS_IOP_MMHG: 17
IOP_METHOD: TONOPEN

## 2019-05-13 NOTE — PROGRESS NOTES
Interval Update: Vision much improved, stopped eye drops five days ago as they ran out, some blurry right eye that improves with blinking      HPI:  Valorie Hahn is a 63 year old female with nuclear sclerosis s/p CEIOL left eye 3/1/19 and right eye 3/8/19. She returns to clinic today reporting that vision is a little blurry left eye, wondering if glasses were prescribed too early. More blurry vision left eye over the past couple weeks not improved by glasses, stopped post operative drops right eye 10 days ago. Blurriness described as wavy. No eye pain or photophobia.     POH: S/p CE/IOL both eyes  PMH: Intermittent asthma  FH: Brother with strabismus and amblyopia s/p strabismus surgery as a child.    Ocular Imaging:  OCT Macula 5/13/19   right eye normal  left eye resolved cystoid macular edema,  -> 329     ASSESSMENT & PLAN:    Valorie Hahn is a 63 year old female with the following diagnoses:      # Cystoid macular edema left eye   Post-operative onset, operation on 3/1/19   OCT with resolved cystoid macular edema left eye   Stopped drops early, though exam looks great today   Okay to keep off of prednisolone and ketorolac     # Pseudophakia both eyes   S/p CEIOL right eye 3/8/19 and left eye 3/1/19   Lenses centered and stable      # Refractive error both eyes   New glasses prescribed at last visit     # Dry Eye Disease  - Discussed importance of lid hygiene   - continue artificial tears twice a day both eyes     Patient disposition: Return in 1 year (on 5/13/2020) for Annual Visit.     Staff: MD Peter Garcia MD  Ophthalmology Resident, PGY-3  [facility]     Teaching statement:  Complete documentation of historical and exam elements from today's encounter can be found in the full encounter summary report (not reduplicated in this progress note). I personally obtained the chief complaint(s) and history of present illness.  I confirmed and edited as necessary  the review of systems, past medical/surgical history, family history, social history, and examination findings as documented by others; and I examined the patient myself. I personally reviewed the relevant tests, images, and reports as documented above.     I formulated and edited as necessary the assessment and plan and discussed the findings and management plan with the patient and family.    Danna Garcia MD  Comprehensive Ophthalmology & Ocular Pathology  Department of Ophthalmology and Visual Neurosciences  [email or web address]  Pager [phone]

## 2019-05-13 NOTE — NURSING NOTE
Chief Complaints and History of Present Illnesses   Patient presents with     Follow Up     3 week follow up Cystoid macular edema left eye      Chief Complaint(s) and History of Present Illness(es)     Follow Up     Comments: 3 week follow up Cystoid macular edema left eye               Comments     Pt states vision has improved in LE. Vision fluctuates in RE, blinking clears vision at times.   No eye pain today. No flashes or floaters.    Daniela PEREZ May 13, 2019 9:18 AM

## 2019-05-13 NOTE — TELEPHONE ENCOUNTER
"Requested Prescriptions   Pending Prescriptions Disp Refills     acyclovir (ZOVIRAX) 400 MG tablet [Pharmacy Med Name: ACYCLOVIR 400MG TABLETS]  This maybe a DUPLICATE.   Last Written Prescription Date:  5-7-19  Last Fill Quantity: 6 tab,  # refills: 11   Last office visit: 5/7/2019 with prescribing provider:  Lisbeth Ruiz    Future Office Visit:    30 tablet 0     Sig: TAKE 1 TABLET BY MOUTH TWICE DAILY FOR 3 DAYS WITH EACH OUTBREAK       Antivirals for Herpes Protocol Passed - 5/13/2019  8:24 AM        Passed - Patient is age 12 or older        Passed - Recent (12 mo) or future (30 days) visit within the authorizing provider's specialty     Patient had office visit in the last 12 months or has a visit in the next 30 days with authorizing provider or within the authorizing provider's specialty.  See \"Patient Info\" tab in inbasket, or \"Choose Columns\" in Meds & Orders section of the refill encounter.          Passed - Medication is active on med list        Passed - Normal serum creatinine on file in past 12 months     Recent Labs   Lab Test 05/07/19  0830   CR 0.76            "

## 2019-05-14 RX ORDER — ACYCLOVIR 400 MG/1
TABLET ORAL
Qty: 0.1 TABLET | Refills: 0 | OUTPATIENT
Start: 2019-05-14

## 2019-07-31 ENCOUNTER — MYC MEDICAL ADVICE (OUTPATIENT)
Dept: OPHTHALMOLOGY | Facility: CLINIC | Age: 64
End: 2019-07-31

## 2019-07-31 NOTE — TELEPHONE ENCOUNTER
Spoke to pt at 1730  Unable to read unclose with right eye now in past 2 weeks    Distance vision about the same  Right eye seems a little fuzzy-- bubbly vision when reading    H/o cystoid macular edema in left eye     Pt concerned swelling developing in right eye    No eye pain  No redness  No photophobia    Scheduled with Dr. Pierre/Dr. Garcia on Monday    Pt satisfied with appt and aware to call for any sudden worsening of vision/eye pain    Note to Dr Jackson for review  Jong Aguiar RN 5:37 PM 07/31/19

## 2019-08-05 ENCOUNTER — OFFICE VISIT (OUTPATIENT)
Dept: OPHTHALMOLOGY | Facility: CLINIC | Age: 64
End: 2019-08-05
Attending: OPHTHALMOLOGY
Payer: COMMERCIAL

## 2019-08-05 DIAGNOSIS — Z96.1 PSEUDOPHAKIA, BOTH EYES: ICD-10-CM

## 2019-08-05 DIAGNOSIS — H35.351 CME (CYSTOID MACULAR EDEMA), RIGHT: Primary | ICD-10-CM

## 2019-08-05 PROCEDURE — 92134 CPTRZ OPH DX IMG PST SGM RTA: CPT | Mod: ZF | Performed by: STUDENT IN AN ORGANIZED HEALTH CARE EDUCATION/TRAINING PROGRAM

## 2019-08-05 PROCEDURE — G0463 HOSPITAL OUTPT CLINIC VISIT: HCPCS | Mod: ZF

## 2019-08-05 RX ORDER — PREDNISOLONE ACETATE 10 MG/ML
1 SUSPENSION/ DROPS OPHTHALMIC 4 TIMES DAILY
Qty: 1 BOTTLE | Refills: 1 | Status: SHIPPED | OUTPATIENT
Start: 2019-08-05 | End: 2019-10-08

## 2019-08-05 RX ORDER — KETOROLAC TROMETHAMINE 5 MG/ML
1 SOLUTION OPHTHALMIC 4 TIMES DAILY
Qty: 1 BOTTLE | Refills: 1 | Status: SHIPPED | OUTPATIENT
Start: 2019-08-05 | End: 2019-10-08

## 2019-08-05 ASSESSMENT — VISUAL ACUITY
OD_PH_CC+: +1
OD_PH_CC: 20/40
CORRECTION_TYPE: GLASSES
METHOD: SNELLEN - LINEAR
OD_CC+: -2
OD_CC: J5
OS_CC: J1
OS_CC: 20/20
OD_CC: 20/40

## 2019-08-05 ASSESSMENT — CONF VISUAL FIELD
OD_NORMAL: 1
METHOD: COUNTING FINGERS
OS_NORMAL: 1

## 2019-08-05 ASSESSMENT — SLIT LAMP EXAM - LIDS
COMMENTS: NORMAL
COMMENTS: NORMAL

## 2019-08-05 ASSESSMENT — EXTERNAL EXAM - RIGHT EYE: OD_EXAM: NORMAL

## 2019-08-05 ASSESSMENT — TONOMETRY
OS_IOP_MMHG: 12
IOP_METHOD: TONOPEN
OD_IOP_MMHG: 12

## 2019-08-05 ASSESSMENT — REFRACTION_WEARINGRX
OS_SPHERE: -0.75
OS_CYLINDER: +0.75
OD_SPHERE: -1.50
OD_CYLINDER: +0.25
OD_ADD: +2.50
OD_AXIS: 024
SPECS_TYPE: PAL
OS_AXIS: 150
OS_ADD: +2.50

## 2019-08-05 ASSESSMENT — CUP TO DISC RATIO
OS_RATIO: 0.25
OD_RATIO: 0.25

## 2019-08-05 NOTE — NURSING NOTE
Chief Complaints and History of Present Illnesses   Patient presents with     Blurred Vision Right Eye     Chief Complaint(s) and History of Present Illness(es)     Blurred Vision Right Eye     Laterality: right eye    Onset: 2 weeks ago    Quality: blurred vision    Frequency: constantly    Timing: throughout the day    Context: reading, distance vision, mid-range vision, near vision, watching TV and computer work    Course: gradually worsening    Associated symptoms: dryness.  Negative for eye pain    Pain scale: 0/10              Comments     Chief Complaints and History of Present Illnesses   Patient presents with     Blurred Vision Right Eye     Chief Complaint(s) and History of Present Illness(es)     Blurred Vision Right Eye     Laterality: right eye    Onset: 2 weeks ago    Quality: blurred vision    Frequency: constantly    Timing: throughout the day    Context: reading, distance vision, mid-range vision, near vision, watching TV and computer work    Course: gradually worsening    Associated symptoms: dryness.  Negative for eye pain    Pain scale: 0/10              Comments     Noting that steady decline of VA of RE at near for last few weeks. Notes image 'bubbly'   Was able to read at near right after surgery without glasses, and now needs them for all near tasks.  Courtney RUSSELL 9:10 AM 08/05/2019

## 2019-08-05 NOTE — PROGRESS NOTES
Interval Update: Vision in right eye has been progressively worsened over past 3 weeks. Reports blurring of vision, azael w/ reading. No flashes, floaters, eye pain.     HPI:  Valorie Hahn is a 63 year old female with nuclear sclerosis s/p CEIOL left eye 3/1/19 and right eye 3/8/19. She returns to clinic today reporting that vision is clear in left eye. More blurry vision right eye over the past couple weeks not improved by glasses. Blurriness described as wavy. No eye pain or photophobia.     POH: S/p CE/IOL both eyes 3/2019  PMH: Intermittent asthma  FH: Brother with strabismus and amblyopia s/p strabismus surgery as a child.    Ocular Imaging:  OCT Macula 8/5/19   right eye w/ new cystoid macular edema,  -> 445  left eye normal, cystoid macular edema ,  -> 329 -> 319    ASSESSMENT & PLAN:    Valorie Hahn is a 63 year old female with the following diagnoses:      # Cystoid macular edema right eye   Post-operative onset, operation on 3/8/19   OCT with new cystoid macular edema right eye   START QID prednisolone and ketorolac      # history of cystoid macular edema left eye   Post-operative onset, operation on 3/1/19   OCT with resolved cystoid macular edema left eye   Okay to keep off of prednisolone and ketorolac     # Pseudophakia both eyes   S/p CEIOL right eye 3/8/19 and left eye 3/1/19   Lenses centered and stable      # Refractive error both eyes   New glasses prescribed previously  No new Rx at present    # Dry Eye Disease  - continue artificial tears twice a day both eyes     Patient disposition: Return in about 4 weeks (around 9/2/2019) for OCT Macula.     Staff: MD Saul Garcia MD - PGY 3  Ophthalmology resident    Teaching Statement  I did not examine the patient, but was available to see the patient if needed. I have discussed the case with the resident and the assessment and plan as documented seems reasonable to me.    Danna Garcia MD  Comprehensive  Ophthalmology & Ocular Pathology  Department of Ophthalmology and Visual Neurosciences  [email or web address]  Pager [phone]

## 2019-09-04 ENCOUNTER — APPOINTMENT (OUTPATIENT)
Dept: CT IMAGING | Facility: CLINIC | Age: 64
End: 2019-09-04
Attending: EMERGENCY MEDICINE
Payer: COMMERCIAL

## 2019-09-04 ENCOUNTER — HOSPITAL ENCOUNTER (EMERGENCY)
Facility: CLINIC | Age: 64
Discharge: HOME | End: 2019-09-04
Attending: EMERGENCY MEDICINE | Admitting: EMERGENCY MEDICINE
Payer: COMMERCIAL

## 2019-09-04 VITALS
HEIGHT: 63 IN | BODY MASS INDEX: 32.66 KG/M2 | WEIGHT: 184.3 LBS | RESPIRATION RATE: 20 BRPM | HEART RATE: 59 BPM | SYSTOLIC BLOOD PRESSURE: 119 MMHG | DIASTOLIC BLOOD PRESSURE: 66 MMHG | OXYGEN SATURATION: 99 % | TEMPERATURE: 97.4 F

## 2019-09-04 DIAGNOSIS — N20.1 URETEROLITHIASIS: ICD-10-CM

## 2019-09-04 DIAGNOSIS — N13.2 HYDRONEPHROSIS WITH RENAL AND URETERAL CALCULUS OBSTRUCTION: ICD-10-CM

## 2019-09-04 DIAGNOSIS — R00.1 BRADYCARDIA: ICD-10-CM

## 2019-09-04 LAB
ALBUMIN SERPL-MCNC: 4 G/DL (ref 3.4–5)
ALBUMIN UR-MCNC: 10 MG/DL
ALP SERPL-CCNC: 82 U/L (ref 40–150)
ALT SERPL W P-5'-P-CCNC: 23 U/L (ref 0–50)
ANION GAP SERPL CALCULATED.3IONS-SCNC: 8 MMOL/L (ref 3–14)
APPEARANCE UR: ABNORMAL
AST SERPL W P-5'-P-CCNC: 11 U/L (ref 0–45)
BACTERIA #/AREA URNS HPF: ABNORMAL /HPF
BASOPHILS # BLD AUTO: 0.1 10E9/L (ref 0–0.2)
BASOPHILS NFR BLD AUTO: 0.8 %
BILIRUB SERPL-MCNC: 0.8 MG/DL (ref 0.2–1.3)
BILIRUB UR QL STRIP: NEGATIVE
BUN SERPL-MCNC: 14 MG/DL (ref 7–30)
CALCIUM SERPL-MCNC: 9.3 MG/DL (ref 8.5–10.1)
CHLORIDE SERPL-SCNC: 110 MMOL/L (ref 94–109)
CO2 SERPL-SCNC: 23 MMOL/L (ref 20–32)
COLOR UR AUTO: YELLOW
CREAT SERPL-MCNC: 0.86 MG/DL (ref 0.52–1.04)
DIFFERENTIAL METHOD BLD: NORMAL
EOSINOPHIL # BLD AUTO: 0.2 10E9/L (ref 0–0.7)
EOSINOPHIL NFR BLD AUTO: 2.4 %
ERYTHROCYTE [DISTWIDTH] IN BLOOD BY AUTOMATED COUNT: 11.6 % (ref 10–15)
GFR SERPL CREATININE-BSD FRML MDRD: 72 ML/MIN/{1.73_M2}
GLUCOSE SERPL-MCNC: 101 MG/DL (ref 70–99)
GLUCOSE UR STRIP-MCNC: NEGATIVE MG/DL
HCT VFR BLD AUTO: 40.8 % (ref 35–47)
HGB BLD-MCNC: 14.1 G/DL (ref 11.7–15.7)
HGB UR QL STRIP: NEGATIVE
IMM GRANULOCYTES # BLD: 0 10E9/L (ref 0–0.4)
IMM GRANULOCYTES NFR BLD: 0.4 %
INTERPRETATION ECG - MUSE: NORMAL
KETONES UR STRIP-MCNC: NEGATIVE MG/DL
LEUKOCYTE ESTERASE UR QL STRIP: NEGATIVE
LIPASE SERPL-CCNC: 86 U/L (ref 73–393)
LYMPHOCYTES # BLD AUTO: 1.9 10E9/L (ref 0.8–5.3)
LYMPHOCYTES NFR BLD AUTO: 26.1 %
MCH RBC QN AUTO: 31.5 PG (ref 26.5–33)
MCHC RBC AUTO-ENTMCNC: 34.6 G/DL (ref 31.5–36.5)
MCV RBC AUTO: 91 FL (ref 78–100)
MONOCYTES # BLD AUTO: 0.6 10E9/L (ref 0–1.3)
MONOCYTES NFR BLD AUTO: 7.7 %
MUCOUS THREADS #/AREA URNS LPF: PRESENT /LPF
NEUTROPHILS # BLD AUTO: 4.5 10E9/L (ref 1.6–8.3)
NEUTROPHILS NFR BLD AUTO: 62.6 %
NITRATE UR QL: NEGATIVE
NRBC # BLD AUTO: 0 10*3/UL
NRBC BLD AUTO-RTO: 0 /100
PH UR STRIP: 8.5 PH (ref 5–7)
PLATELET # BLD AUTO: 256 10E9/L (ref 150–450)
POTASSIUM SERPL-SCNC: 4.2 MMOL/L (ref 3.4–5.3)
PROT SERPL-MCNC: 7.4 G/DL (ref 6.8–8.8)
RBC # BLD AUTO: 4.48 10E12/L (ref 3.8–5.2)
RBC #/AREA URNS AUTO: <1 /HPF (ref 0–2)
SODIUM SERPL-SCNC: 141 MMOL/L (ref 133–144)
SOURCE: ABNORMAL
SP GR UR STRIP: 1.02 (ref 1–1.03)
SQUAMOUS #/AREA URNS AUTO: 4 /HPF (ref 0–1)
TROPONIN I SERPL-MCNC: <0.015 UG/L (ref 0–0.04)
UROBILINOGEN UR STRIP-MCNC: NORMAL MG/DL (ref 0–2)
WBC # BLD AUTO: 7.2 10E9/L (ref 4–11)
WBC #/AREA URNS AUTO: <1 /HPF (ref 0–5)

## 2019-09-04 PROCEDURE — 84484 ASSAY OF TROPONIN QUANT: CPT | Performed by: EMERGENCY MEDICINE

## 2019-09-04 PROCEDURE — 93010 ELECTROCARDIOGRAM REPORT: CPT | Mod: Z6 | Performed by: EMERGENCY MEDICINE

## 2019-09-04 PROCEDURE — 99285 EMERGENCY DEPT VISIT HI MDM: CPT | Mod: 25 | Performed by: EMERGENCY MEDICINE

## 2019-09-04 PROCEDURE — 96374 THER/PROPH/DIAG INJ IV PUSH: CPT

## 2019-09-04 PROCEDURE — 96375 TX/PRO/DX INJ NEW DRUG ADDON: CPT

## 2019-09-04 PROCEDURE — 99285 EMERGENCY DEPT VISIT HI MDM: CPT | Mod: 25

## 2019-09-04 PROCEDURE — 74176 CT ABD & PELVIS W/O CONTRAST: CPT

## 2019-09-04 PROCEDURE — 93005 ELECTROCARDIOGRAM TRACING: CPT

## 2019-09-04 PROCEDURE — 81001 URINALYSIS AUTO W/SCOPE: CPT | Performed by: EMERGENCY MEDICINE

## 2019-09-04 PROCEDURE — 96361 HYDRATE IV INFUSION ADD-ON: CPT

## 2019-09-04 PROCEDURE — 25000128 H RX IP 250 OP 636: Performed by: EMERGENCY MEDICINE

## 2019-09-04 PROCEDURE — 85025 COMPLETE CBC W/AUTO DIFF WBC: CPT | Performed by: EMERGENCY MEDICINE

## 2019-09-04 PROCEDURE — 80053 COMPREHEN METABOLIC PANEL: CPT | Performed by: EMERGENCY MEDICINE

## 2019-09-04 PROCEDURE — 83690 ASSAY OF LIPASE: CPT | Performed by: EMERGENCY MEDICINE

## 2019-09-04 RX ORDER — METOCLOPRAMIDE HYDROCHLORIDE 5 MG/ML
5 INJECTION INTRAMUSCULAR; INTRAVENOUS ONCE
Status: COMPLETED | OUTPATIENT
Start: 2019-09-04 | End: 2019-09-04

## 2019-09-04 RX ORDER — KETOROLAC TROMETHAMINE 15 MG/ML
15 INJECTION, SOLUTION INTRAMUSCULAR; INTRAVENOUS ONCE
Status: COMPLETED | OUTPATIENT
Start: 2019-09-04 | End: 2019-09-04

## 2019-09-04 RX ORDER — IBUPROFEN 800 MG/1
800 TABLET, FILM COATED ORAL EVERY 8 HOURS PRN
Qty: 30 TABLET | Refills: 0 | Status: SHIPPED | OUTPATIENT
Start: 2019-09-04 | End: 2020-08-05

## 2019-09-04 RX ORDER — ONDANSETRON 4 MG/1
4 TABLET, ORALLY DISINTEGRATING ORAL EVERY 8 HOURS PRN
Qty: 14 TABLET | Refills: 0 | Status: SHIPPED | OUTPATIENT
Start: 2019-09-04 | End: 2020-01-10

## 2019-09-04 RX ORDER — MORPHINE SULFATE 4 MG/ML
4 INJECTION, SOLUTION INTRAMUSCULAR; INTRAVENOUS ONCE
Status: COMPLETED | OUTPATIENT
Start: 2019-09-04 | End: 2019-09-04

## 2019-09-04 RX ORDER — ONDANSETRON 2 MG/ML
4 INJECTION INTRAMUSCULAR; INTRAVENOUS ONCE
Status: COMPLETED | OUTPATIENT
Start: 2019-09-04 | End: 2019-09-04

## 2019-09-04 RX ORDER — OXYCODONE HYDROCHLORIDE 5 MG/1
5 TABLET ORAL EVERY 6 HOURS PRN
Qty: 8 TABLET | Refills: 0 | Status: SHIPPED | OUTPATIENT
Start: 2019-09-04 | End: 2019-12-12

## 2019-09-04 RX ADMIN — ONDANSETRON 4 MG: 2 INJECTION INTRAMUSCULAR; INTRAVENOUS at 11:57

## 2019-09-04 RX ADMIN — SODIUM CHLORIDE 1000 ML: 9 INJECTION, SOLUTION INTRAVENOUS at 11:58

## 2019-09-04 RX ADMIN — KETOROLAC TROMETHAMINE 15 MG: 15 INJECTION, SOLUTION INTRAMUSCULAR; INTRAVENOUS at 13:05

## 2019-09-04 RX ADMIN — MORPHINE SULFATE 4 MG: 4 INJECTION INTRAVENOUS at 11:57

## 2019-09-04 RX ADMIN — METOCLOPRAMIDE 5 MG: 5 INJECTION, SOLUTION INTRAMUSCULAR; INTRAVENOUS at 13:05

## 2019-09-04 ASSESSMENT — ENCOUNTER SYMPTOMS
NECK PAIN: 0
AGITATION: 0
FREQUENCY: 0
FEVER: 0
DIARRHEA: 0
CONSTIPATION: 0
COUGH: 1
NAUSEA: 1
HEADACHES: 0
VOMITING: 1
ABDOMINAL PAIN: 1
CHILLS: 0
DYSURIA: 0
DIZZINESS: 0
BACK PAIN: 1
TROUBLE SWALLOWING: 0
BLOOD IN STOOL: 0
HEMATURIA: 0
DIFFICULTY URINATING: 0

## 2019-09-04 ASSESSMENT — MIFFLIN-ST. JEOR: SCORE: 1365.11

## 2019-09-04 NOTE — ED TRIAGE NOTES
Pt c/o abd pain and flank pain. Hx of kidney stones but pain is higher up that she has experienced in the past. Voiding well. Nausea present.

## 2019-09-04 NOTE — ED PROVIDER NOTES
History     Chief Complaint   Patient presents with     Abdominal Pain     Flank Pain     The history is provided by the patient and a friend.     Valorie Hahn is a 63 year old female with a history of intermittent asthma, GERD, status post appendectomy, posterior vitreous detachment (2010), and arthritis, who presents to the Emergency Department for evaluation of abdominal pain. Patient complains of right upper quadrant pain with gradual onset three to four days ago for the past few days ago and is particularly pronounced morning -now associated with nausea and vomiting--with approximately three episodes of non-bloody non-bilious emesis since this morning. Patient notes that the pain radiates around to her back. She describes the pain as colicky in nature and seems to be aggravated by standing upright. She states that her pain is somewhat alleviated by lying on her side. She has not noticed any change with eating. She denies any urinary or bowel changes. She denies any hematochezia, melena, hematemesis, vaginal bleeding, vaginal discharge, fevers or chills. She endorses an ongoing cough attributed to her asthma. She reportedly uses albuterol inhalers on a as needed basis. She has not attempted any over the counter therapies for this. She denies any recent illicit substance use. She does endorse occasional alcohol use. She does endorse a previous appendectomy but denies any other abdominal surgeries. Patient briefly mentions that her entire left lower extremity feels warm to the touch. She noticed this a week ago but states that it is more noticeable this morning.     Of note, patient reports that her current abdominal pain is fairly consistent with pain she has had intermittently for years. She has never had this formally diagnosed. She states that she typically notices this more during the mornings. She also had similar pain with her previous kidney stones, which she eventually passed, but states that  that was located lower on her abdomen.        I have reviewed the Medications, Allergies, Past Medical and Surgical History, and Social History in the [employer] system.    Past Medical History:   Diagnosis Date     Arthritis 2012    SI joint, osteoarthritis     Complication of anesthesia      Esophageal reflux      Genital herpes, unspecified      Mild intermittent asthma with exacerbation      PONV (postoperative nausea and vomiting)        Past Surgical History:   Procedure Laterality Date     C APPENDECTOMY       C NONSPECIFIC PROCEDURE      Anal fissure repair     COLONOSCOPY      some polyps removed     COLONOSCOPY WITH CO2 INSUFFLATION N/A 1/3/2019    Procedure: COLONOSCOPY WITH CO2 INSUFFLATION;  Surgeon: Arnold Adler MD;  Location: [facility] OR     HC TOOTH EXTRACTION W/FORCEP       PHACOEMULSIFICATION WITH STANDARD INTRAOCULAR LENS IMPLANT Left 3/1/2019    Procedure: Left Eye Cataract Removal with Intraocular Lens Implant;  Surgeon: Danna Garcia MD;  Location: [facility] OR     PHACOEMULSIFICATION WITH STANDARD INTRAOCULAR LENS IMPLANT Right 3/8/2019    Procedure: Right Eye Cataract Removal with Intraocular Lens Implant;  Surgeon: Danna Garcia MD;  Location: [facility] OR       Family History   Problem Relation Age of Onset     Respiratory Mother         emphysema     Mental Illness Mother      Thyroid Disease Mother      Thyroid Disease Mother         hypothyroid, takes supplement     Cerebrovascular Disease Mother      Depression Mother      Mental Illness Mother      Glaucoma Mother      Mental Illness Maternal Grandmother      Depression Maternal Grandmother      Diabetes Father         type 2     Heart Disease Father         bypass surgeries x 2     Other Cancer Father         non hodgkins     Skin Cancer Father      Thyroid Disease Sister         hypothyroid     Thyroid Disease Sister         hypothyroid     Psychotic Disorder Sister         commited suicide, depression     Cancer Brother          esophogeal cancer, work exposure to chemicals     Blood Disease Brother          hiv  aids     Blood Disease Brother         hep c     Blood Disease Brother         hiv positive, passed away     Family History Negative Brother      Diabetes Brother      Mental Illness Sister      Thyroid Disease Sister      Thyroid Disease Sister      Diabetes Brother         type 2     Depression Sister      Depression Sister      Depression Brother      Melanoma No family hx of      Macular Degeneration No family hx of        Social History     Tobacco Use     Smoking status: Former Smoker     Packs/day: 0.50     Years: 10.00     Pack years: 5.00     Types: Cigarettes     Start date: 1990     Last attempt to quit: 2000     Years since quittin.2     Smokeless tobacco: Never Used   Substance Use Topics     Alcohol use: Yes     Comment: 1-2 glasses of wine per night     No current facility-administered medications for this encounter.      Current Outpatient Medications   Medication     ibuprofen (ADVIL/MOTRIN) 800 MG tablet     ketorolac (ACULAR) 0.5 % ophthalmic solution     meloxicam (MOBIC) 7.5 MG tablet     ondansetron (ZOFRAN-ODT) 4 MG ODT tab     oxyCODONE (ROXICODONE) 5 MG tablet     prednisoLONE acetate (PRED FORTE) 1 % ophthalmic suspension     acyclovir (ZOVIRAX) 400 MG tablet     UNABLE TO FIND        Allergies   Allergen Reactions     Penicillin G Hives       Review of Systems   Constitutional: Negative for chills and fever.   HENT: Negative for trouble swallowing.    Eyes: Negative for visual disturbance.   Respiratory: Positive for cough (ongoing with no change).    Gastrointestinal: Positive for abdominal pain (RUQ), nausea and vomiting. Negative for blood in stool, constipation and diarrhea.   Genitourinary: Negative for difficulty urinating, dysuria, frequency, hematuria, vaginal bleeding and vaginal discharge.   Musculoskeletal: Positive for back pain. Negative for neck pain.   Skin: Negative for  "rash.   Neurological: Negative for dizziness and headaches.   Psychiatric/Behavioral: Negative for agitation.       Physical Exam   BP: (!) 168/77  Pulse: 60  Heart Rate: 59  Temp: 97.4  F (36.3  C)  Resp: 20  Height: 160 cm (5' 3\")  Weight: 83.6 kg (184 lb 4.8 oz)  SpO2: 94 %      Physical Exam   Constitutional: She is oriented to person, place, and time. She appears well-developed and well-nourished.  Non-toxic appearance. She appears distressed.   HENT:   Head: Atraumatic.   Mouth/Throat: Oropharynx is clear and moist. No oropharyngeal exudate.   Eyes: Pupils are equal, round, and reactive to light. No scleral icterus.   Cardiovascular: Normal heart sounds and intact distal pulses.   Pulmonary/Chest: Breath sounds normal. No respiratory distress.   Abdominal: Soft. Bowel sounds are normal. There is tenderness in the right upper quadrant. There is no rigidity, no rebound, no guarding, no CVA tenderness and negative 's sign.   Moderate tenderness to palpation in the right upper quadrant.  No guarding rigidity or other peritoneal signs.  There is some right-sided CVA tenderness as well.   Musculoskeletal: She exhibits no edema or tenderness.   Neurological: She is alert and oriented to person, place, and time.   Skin: Skin is warm. Capillary refill takes 2 to 3 seconds. No rash noted. She is not diaphoretic.       ED Course        Procedures             EKG Interpretation:      Interpreted by Adams Julien DO  Time reviewed: 1213  Symptoms at time of EKG: RUQ pain   Rhythm: sinus bradycardia  Rate: normal  Axis: normal  Ectopy: none  Conduction: normal  ST Segments/ T Waves: No ST-T wave changes  Q Waves: none  Comparison to prior: new bradycardia, o/w unchanged    Clinical Impression: normal EKG                 Labs Ordered and Resulted from Time of ED Arrival Up to the Time of Departure from the ED   COMPREHENSIVE METABOLIC PANEL - Abnormal; Notable for the following components:       Result Value    " Chloride 110 (*)     Glucose 101 (*)     All other components within normal limits   UA MACROSCOPIC WITH REFLEX TO MICRO AND CULTURE - Abnormal; Notable for the following components:    pH Urine 8.5 (*)     Protein Albumin Urine 10 (*)     Bacteria Urine Few (*)     Squamous Epithelial /HPF Urine 4 (*)     Mucous Urine Present (*)     All other components within normal limits   CBC WITH PLATELETS DIFFERENTIAL   LIPASE   TROPONIN I   PERIPHERAL IV CATHETER            Assessments & Plan (with Medical Decision Making)   Resents for evaluation of right flank and right upper quadrant pain.  Initial differential, kidney stone, pyelonephritis, cholecystitis, hepatitis.  I have reviewed the nursing notes.  Arrival, patient is distressed, obviously in pain.  She was given IV fluids, morphine, Zofran for active vomiting.  Symptoms improved with medication.  Laboratory work-up is largely unremarkable.  Obtain a noncontrast CT which shows some perinephric stranding on the right.  There also appears to be a small stone at the UVJ on the right.  Appears to be right at the orifice, will likely pass.  There is no major hydronephrosis.  The urine it does not appear infected.  Patient required a repeat dose of pain medication while in the ED.  I will start her on ibuprofen 3 times a day plus oxycodone for breakthrough pain.  Also given nausea medication.  She is to follow-up with the neurology clinic in 7 to 10 days.  This information has been provided to her.  She understands and is comfortable with this plan.    I have reviewed the findings, diagnosis, plan and need for follow up with the patient.    New Prescriptions    IBUPROFEN (ADVIL/MOTRIN) 800 MG TABLET    Take 1 tablet (800 mg) by mouth every 8 hours as needed for moderate pain    ONDANSETRON (ZOFRAN-ODT) 4 MG ODT TAB    Take 1 tablet (4 mg) by mouth every 8 hours as needed for nausea    OXYCODONE (ROXICODONE) 5 MG TABLET    Take 1 tablet (5 mg) by mouth every 6 hours as  needed for pain       Final diagnoses:   Ureterolithiasis     I, Celsa Barrett, am serving as a trained medical scribe to document services personally performed by Adams Julien DO, based on the provider's statements to me.   IAdams DO, was physically present and have reviewed and verified the accuracy of this note documented by Celsa Barrett.    9/4/2019   [facility], [place], EMERGENCY DEPARTMENT     Adams Julien DO  09/04/19 1417

## 2019-09-04 NOTE — ED AVS SNAPSHOT
[facility], [place], Emergency Department  [address] [place] [id number]  Phone:  [phone]                                    Valorie Hahn   MRN: [id number]    Department:  [facility], Emergency Department   Date of Visit:  9/4/2019           After Visit Summary Signature Page    I have received my discharge instructions, and my questions have been answered. I have discussed any challenges I see with this plan with the nurse or doctor.    ..........................................................................................................................................  Patient/Patient Representative Signature      ..........................................................................................................................................  Patient Representative Print Name and Relationship to Patient    ..................................................               ................................................  Date                                   Time    ..........................................................................................................................................  Reviewed by Signature/Title    ...................................................              ..............................................  Date                                               Time          22EPIC Rev 08/18

## 2019-09-04 NOTE — DISCHARGE INSTRUCTIONS
Please make an appointment to follow up with Urology Clinic (phone: [phone]) in 7-10 days.  Return with fevers, worsening pain, or any other symptoms that concern you.

## 2019-09-10 DIAGNOSIS — H35.351 CME (CYSTOID MACULAR EDEMA), RIGHT: Primary | ICD-10-CM

## 2019-09-11 ENCOUNTER — OFFICE VISIT (OUTPATIENT)
Dept: OPHTHALMOLOGY | Facility: CLINIC | Age: 64
End: 2019-09-11
Attending: OPHTHALMOLOGY
Payer: COMMERCIAL

## 2019-09-11 DIAGNOSIS — H35.351 CME (CYSTOID MACULAR EDEMA), RIGHT: ICD-10-CM

## 2019-09-11 DIAGNOSIS — Z96.1 PSEUDOPHAKIA, BOTH EYES: Primary | ICD-10-CM

## 2019-09-11 PROCEDURE — 92134 CPTRZ OPH DX IMG PST SGM RTA: CPT | Mod: ZF | Performed by: OPHTHALMOLOGY

## 2019-09-11 PROCEDURE — G0463 HOSPITAL OUTPT CLINIC VISIT: HCPCS | Mod: ZF

## 2019-09-11 ASSESSMENT — REFRACTION_WEARINGRX
OD_ADD: +2.50
OD_AXIS: 024
OS_SPHERE: -0.75
OD_SPHERE: -1.50
OD_CYLINDER: +0.25
OS_CYLINDER: +0.75
OS_AXIS: 150
OS_ADD: +2.50
SPECS_TYPE: PAL

## 2019-09-11 ASSESSMENT — CUP TO DISC RATIO
OS_RATIO: 0.25
OD_RATIO: 0.25

## 2019-09-11 ASSESSMENT — SLIT LAMP EXAM - LIDS
COMMENTS: NORMAL
COMMENTS: NORMAL

## 2019-09-11 ASSESSMENT — VISUAL ACUITY
OD_CC+: -1
CORRECTION_TYPE: GLASSES
OD_CC: 20/30
OS_CC: 20/20
METHOD: SNELLEN - LINEAR

## 2019-09-11 ASSESSMENT — CONF VISUAL FIELD
OS_NORMAL: 1
OD_NORMAL: 1
METHOD: COUNTING FINGERS

## 2019-09-11 ASSESSMENT — TONOMETRY
IOP_METHOD: TONOPEN
OS_IOP_MMHG: 15
OD_IOP_MMHG: 17

## 2019-09-11 ASSESSMENT — EXTERNAL EXAM - RIGHT EYE: OD_EXAM: NORMAL

## 2019-09-11 NOTE — PROGRESS NOTES
"HPI:  Valorie Hahn is a 63 year old female s/p CEIOL left eye 3/1/19 and right eye 3/8/19. Found to have CME right eye at last visit with Dr. Pierre and re-started prednisolone and ketorolac. She notes the \"bubbled\" appearance to her vision has improved, but the entire field of vision is now fogged and blurred. She stopped using the pred and ketorolac 2 days ago. No pain, redness, discharge. No flashes/floaters.     POH: S/p CE/IOL both eyes 3/2019  PMH: Intermittent asthma  FH: Brother with strabismus and amblyopia s/p strabismus surgery as a child.    ASSESSMENT & PLAN:  (Z96.1) Pseudophakia, both eyes  (primary encounter diagnosis)  (H35.351) CME (cystoid macular edema), right  Comment: Much improved CME right eye with only few tiny cysts remaining. 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  Plan: Continue off ketorolac and prednisolone. Start ATs 3-4 x daily right eye.     Patient disposition: Return in about 4 weeks (around 10/9/2019) for macula OCT right eye and ocular surface check.     Teaching statement:  Complete documentation of historical and exam elements from today's encounter can be found in the full encounter summary report (not reduplicated in this progress note). I personally obtained the chief complaint(s) and history of present illness.  I confirmed and edited as necessary the review of systems, past medical/surgical history, family history, social history, and examination findings as documented by others; and I examined the patient myself. I personally reviewed the relevant tests, images, and reports as documented above.     I formulated and edited as necessary the assessment and plan and discussed the findings and management plan with the patient and family.    Danna Garcia MD  Comprehensive Ophthalmology & Ocular Pathology  Department of Ophthalmology and Visual Neurosciences  [email or web address]  Pager [phone]      "

## 2019-09-11 NOTE — NURSING NOTE
Chief Complaints and History of Present Illnesses   Patient presents with     Follow Up     CME (cystoid macular edema), right     Chief Complaint(s) and History of Present Illness(es)     Follow Up     Laterality: right eye    Associated symptoms: Negative for dryness, eye pain, redness and tearing    Pain scale: 0/10    Comments: CME (cystoid macular edema), right              Comments     She states that the vision in her right eye has changed int he past 5 weeks.  Initially she was seeing a bubbling effect, which has resolved.  However, now her vision in that eye appears gray and fogged.    Ashley Jauregui, DREW 10:34 AM  September 11, 2019

## 2019-10-03 ENCOUNTER — HEALTH MAINTENANCE LETTER (OUTPATIENT)
Age: 64
End: 2019-10-03

## 2019-10-07 DIAGNOSIS — H35.351 CME (CYSTOID MACULAR EDEMA), RIGHT: Primary | ICD-10-CM

## 2019-10-08 ENCOUNTER — OFFICE VISIT (OUTPATIENT)
Dept: OPHTHALMOLOGY | Facility: CLINIC | Age: 64
End: 2019-10-08
Attending: OPHTHALMOLOGY
Payer: COMMERCIAL

## 2019-10-08 DIAGNOSIS — H35.351 CME (CYSTOID MACULAR EDEMA), RIGHT: Primary | ICD-10-CM

## 2019-10-08 DIAGNOSIS — Z96.1 PSEUDOPHAKIA, BOTH EYES: ICD-10-CM

## 2019-10-08 PROCEDURE — 92134 CPTRZ OPH DX IMG PST SGM RTA: CPT | Mod: ZF | Performed by: OPHTHALMOLOGY

## 2019-10-08 PROCEDURE — G0463 HOSPITAL OUTPT CLINIC VISIT: HCPCS | Mod: ZF

## 2019-10-08 RX ORDER — PREDNISOLONE ACETATE 10 MG/ML
1 SUSPENSION/ DROPS OPHTHALMIC 4 TIMES DAILY
Qty: 1 BOTTLE | Refills: 3 | Status: SHIPPED | OUTPATIENT
Start: 2019-10-08 | End: 2019-12-20

## 2019-10-08 ASSESSMENT — EXTERNAL EXAM - RIGHT EYE: OD_EXAM: NORMAL

## 2019-10-08 ASSESSMENT — VISUAL ACUITY
METHOD: SNELLEN - LINEAR
OS_CC: 20/20
OD_CC: 20/30
CORRECTION_TYPE: GLASSES

## 2019-10-08 ASSESSMENT — CUP TO DISC RATIO
OS_RATIO: 0.25
OD_RATIO: 0.25

## 2019-10-08 ASSESSMENT — REFRACTION_WEARINGRX
OD_AXIS: 024
SPECS_TYPE: PAL
OD_ADD: +2.50
OS_ADD: +2.50
OS_CYLINDER: +0.75
OS_SPHERE: -0.75
OS_AXIS: 150
OD_SPHERE: -1.50
OD_CYLINDER: +0.25

## 2019-10-08 ASSESSMENT — CONF VISUAL FIELD
OS_NORMAL: 1
OD_NORMAL: 1

## 2019-10-08 ASSESSMENT — TONOMETRY
OD_IOP_MMHG: 13
IOP_METHOD: ICARE
OS_IOP_MMHG: 13

## 2019-10-08 ASSESSMENT — SLIT LAMP EXAM - LIDS
COMMENTS: NORMAL
COMMENTS: NORMAL

## 2019-10-08 NOTE — NURSING NOTE
Chief Complaints and History of Present Illnesses   Patient presents with     Cystoid Macular Edema Follow Up     Chief Complaint(s) and History of Present Illness(es)     Cystoid Macular Edema Follow Up     Laterality: right eye    Onset: 1 month ago              Comments     Pt. States that VA has improved RE but is still blurry.  No pain BE.  Madelyn Camara COT 10:44 AM October 8, 2019

## 2019-11-11 ENCOUNTER — OFFICE VISIT (OUTPATIENT)
Dept: OPHTHALMOLOGY | Facility: CLINIC | Age: 64
End: 2019-11-11
Attending: OPHTHALMOLOGY
Payer: COMMERCIAL

## 2019-11-11 DIAGNOSIS — H35.351 CME (CYSTOID MACULAR EDEMA), RIGHT: Primary | ICD-10-CM

## 2019-11-11 DIAGNOSIS — H35.351 CME (CYSTOID MACULAR EDEMA), RIGHT: ICD-10-CM

## 2019-11-11 DIAGNOSIS — H35.352 CYSTOID MACULAR EDEMA, LEFT EYE: ICD-10-CM

## 2019-11-11 PROCEDURE — G0463 HOSPITAL OUTPT CLINIC VISIT: HCPCS | Mod: ZF

## 2019-11-11 PROCEDURE — 92134 CPTRZ OPH DX IMG PST SGM RTA: CPT | Mod: ZF | Performed by: STUDENT IN AN ORGANIZED HEALTH CARE EDUCATION/TRAINING PROGRAM

## 2019-11-11 RX ORDER — BROMFENAC 1.03 MG/ML
1 SOLUTION/ DROPS OPHTHALMIC DAILY
Qty: 5 ML | Refills: 3 | Status: SHIPPED | OUTPATIENT
Start: 2019-11-11 | End: 2020-01-10

## 2019-11-11 ASSESSMENT — CONF VISUAL FIELD
OD_NORMAL: 1
OS_NORMAL: 1

## 2019-11-11 ASSESSMENT — REFRACTION_WEARINGRX
SPECS_TYPE: PAL
OS_ADD: +2.50
OD_SPHERE: -1.50
OD_CYLINDER: +0.25
OD_AXIS: 024
OS_CYLINDER: +0.75
OS_SPHERE: -0.75
OD_ADD: +2.50
OS_AXIS: 150

## 2019-11-11 ASSESSMENT — EXTERNAL EXAM - RIGHT EYE: OD_EXAM: NORMAL

## 2019-11-11 ASSESSMENT — VISUAL ACUITY
OS_CC: 20/20
METHOD: SNELLEN - LINEAR
OD_CC: 20/40

## 2019-11-11 ASSESSMENT — CUP TO DISC RATIO
OD_RATIO: 0.25
OS_RATIO: 0.15

## 2019-11-11 ASSESSMENT — SLIT LAMP EXAM - LIDS
COMMENTS: NORMAL
COMMENTS: NORMAL

## 2019-11-11 ASSESSMENT — TONOMETRY
OS_IOP_MMHG: 13
IOP_METHOD: TONOPEN
OD_IOP_MMHG: 14

## 2019-11-11 NOTE — PATIENT INSTRUCTIONS
Continue prednisolone 4 times daily right eye   ADD Bromday 1 time daily, right eye     Return in 1 month for repeat testing

## 2019-11-11 NOTE — NURSING NOTE
Chief Complaints and History of Present Illnesses   Patient presents with     Cystoid Macular Edema Follow Up     Chief Complaint(s) and History of Present Illness(es)     Cystoid Macular Edema Follow Up     Laterality: right eye    Onset: 1 month ago              Comments     Pt. States that VA has improved slightly RE.  No pain BE.  Madelyn Camara COT 10:15 AM November 11, 2019

## 2019-11-12 ENCOUNTER — OFFICE VISIT (OUTPATIENT)
Dept: FAMILY MEDICINE | Facility: CLINIC | Age: 64
End: 2019-11-12
Payer: COMMERCIAL

## 2019-11-12 VITALS
TEMPERATURE: 96.9 F | SYSTOLIC BLOOD PRESSURE: 106 MMHG | HEART RATE: 67 BPM | HEIGHT: 63 IN | WEIGHT: 182 LBS | OXYGEN SATURATION: 96 % | DIASTOLIC BLOOD PRESSURE: 58 MMHG | BODY MASS INDEX: 32.25 KG/M2 | RESPIRATION RATE: 16 BRPM

## 2019-11-12 DIAGNOSIS — Z23 NEED FOR PROPHYLACTIC VACCINATION AND INOCULATION AGAINST INFLUENZA: ICD-10-CM

## 2019-11-12 DIAGNOSIS — L72.9 SKIN CYST: ICD-10-CM

## 2019-11-12 DIAGNOSIS — Z13.1 SCREENING FOR DIABETES MELLITUS: ICD-10-CM

## 2019-11-12 DIAGNOSIS — Z71.84 COUNSELING FOR TRAVEL: ICD-10-CM

## 2019-11-12 DIAGNOSIS — J45.20 MILD INTERMITTENT ASTHMA WITHOUT COMPLICATION: ICD-10-CM

## 2019-11-12 DIAGNOSIS — M25.841 CYST OF JOINT OF RIGHT HAND: Primary | ICD-10-CM

## 2019-11-12 PROBLEM — H35.352 CYSTOID MACULAR EDEMA, LEFT EYE: Status: ACTIVE | Noted: 2019-11-12

## 2019-11-12 LAB
GLUCOSE SERPL-MCNC: 86 MG/DL (ref 70–99)
HBA1C MFR BLD: 5 % (ref 0–5.6)

## 2019-11-12 PROCEDURE — 83036 HEMOGLOBIN GLYCOSYLATED A1C: CPT | Performed by: NURSE PRACTITIONER

## 2019-11-12 PROCEDURE — 99214 OFFICE O/P EST MOD 30 MIN: CPT | Mod: 25 | Performed by: NURSE PRACTITIONER

## 2019-11-12 PROCEDURE — 90471 IMMUNIZATION ADMIN: CPT | Performed by: NURSE PRACTITIONER

## 2019-11-12 PROCEDURE — 90682 RIV4 VACC RECOMBINANT DNA IM: CPT | Performed by: NURSE PRACTITIONER

## 2019-11-12 PROCEDURE — 82947 ASSAY GLUCOSE BLOOD QUANT: CPT | Performed by: NURSE PRACTITIONER

## 2019-11-12 PROCEDURE — 36415 COLL VENOUS BLD VENIPUNCTURE: CPT | Performed by: NURSE PRACTITIONER

## 2019-11-12 ASSESSMENT — MIFFLIN-ST. JEOR: SCORE: 1354.68

## 2019-11-12 NOTE — PROGRESS NOTES
"Subjective     Valorie Hahn is a 63 year old female who presents to clinic today for the following health issues:  Chief Complaint   Patient presents with     Finger     RIGHT HAND RING FINGER x1 year     Flu Shot       HPI     Right hand ring finger:  Cyst at the base/palmar side of her finger.  The cyst is getting larger and is now painful.  No trigger finger.    Left foot big toe cyst.  On the top of her big toe.  \"is it a bunion?\"  The swelling/cyst started a few months ago.  It is getting larger and she would like it treated.  It does not interfere with her shoes, walking, etc.      Cystoid macular edema:  This is a complication of her spring cataract/lens implant surgery.  Complicated.       Travel:  Requesting a referral to the travel clinic.    Asthma:  Using her inhalers as prescribed.  Asthma is controlled.    Patient Active Problem List   Diagnosis     Herpes simplex vulvovaginitis     Mild intermittent asthma without complication     CARDIOVASCULAR SCREENING; LDL GOAL LESS THAN 160     Family history of thyroid disease     Advanced directives, counseling/discussion     SI (sacroiliac) joint dysfunction     Osteitis, condensans     Sacroiliac joint disease     SI (sacroiliac) pain     Colon polyps     Dyspnea     Pain in joint involving ankle and foot, right     Cataract of both eyes, unspecified cataract type     Posterior vitreous detachment     CME (cystoid macular edema), right     Past Surgical History:   Procedure Laterality Date     C APPENDECTOMY       C NONSPECIFIC PROCEDURE      Anal fissure repair     COLONOSCOPY      some polyps removed     COLONOSCOPY WITH CO2 INSUFFLATION N/A 1/3/2019    Procedure: COLONOSCOPY WITH CO2 INSUFFLATION;  Surgeon: Arnold Adler MD;  Location:  OR      TOOTH EXTRACTION W/FORCEP       PHACOEMULSIFICATION WITH STANDARD INTRAOCULAR LENS IMPLANT Left 3/1/2019    Procedure: Left Eye Cataract Removal with Intraocular Lens Implant;  Surgeon: " Danna Garcia MD;  Location:  OR     PHACOEMULSIFICATION WITH STANDARD INTRAOCULAR LENS IMPLANT Right 3/8/2019    Procedure: Right Eye Cataract Removal with Intraocular Lens Implant;  Surgeon: Danna Garcia MD;  Location:  OR       Social History     Tobacco Use     Smoking status: Former Smoker     Packs/day: 0.50     Years: 10.00     Pack years: 5.00     Types: Cigarettes     Start date: 1990     Last attempt to quit: 2000     Years since quittin.4     Smokeless tobacco: Never Used   Substance Use Topics     Alcohol use: Yes     Comment: 1-2 glasses of wine per night     Family History   Problem Relation Age of Onset     Respiratory Mother         emphysema     Mental Illness Mother      Thyroid Disease Mother      Thyroid Disease Mother         hypothyroid, takes supplement     Cerebrovascular Disease Mother      Depression Mother      Mental Illness Mother      Glaucoma Mother      Mental Illness Maternal Grandmother      Depression Maternal Grandmother      Diabetes Father         type 2     Heart Disease Father         bypass surgeries x 2     Other Cancer Father         non hodgkins     Skin Cancer Father      Thyroid Disease Sister         hypothyroid     Thyroid Disease Sister         hypothyroid     Psychotic Disorder Sister         commited suicide, depression     Cancer Brother         esophogeal cancer, work exposure to chemicals     Blood Disease Brother          hiv  aids     Blood Disease Brother         hep c     Blood Disease Brother         hiv positive, passed away     Family History Negative Brother      Diabetes Brother      Mental Illness Sister      Thyroid Disease Sister      Thyroid Disease Sister      Diabetes Brother         type 2     Depression Sister      Depression Sister      Depression Brother      Melanoma No family hx of      Macular Degeneration No family hx of          Current Outpatient Medications   Medication Sig Dispense Refill     acyclovir  (ZOVIRAX) 400 MG tablet Take 1 tablet (400 mg) by mouth 2 times daily For 3 days with each outbreak. 6 tablet 11     bromfenac (BROMDAY) 0.09 % ophthalmic solution Place 1 drop into the right eye daily 5 mL 3     meloxicam (MOBIC) 7.5 MG tablet Take 1 tablet (7.5 mg) by mouth daily 30 tablet 11     UNABLE TO FIND Place 1 drop into both eyes At Bedtime Serum tears       ondansetron (ZOFRAN-ODT) 4 MG ODT tab Take 1 tablet (4 mg) by mouth every 8 hours as needed for nausea (Patient not taking: Reported on 11/12/2019) 14 tablet 0     oxyCODONE (ROXICODONE) 5 MG tablet Take 1 tablet (5 mg) by mouth every 6 hours as needed for pain (Patient not taking: Reported on 11/12/2019) 8 tablet 0     prednisoLONE acetate (PRED FORTE) 1 % ophthalmic suspension Place 1 drop into the right eye 4 times daily (Patient not taking: Reported on 11/12/2019) 1 Bottle 3     Allergies   Allergen Reactions     Penicillin G Hives     Recent Labs   Lab Test 09/04/19  1150 05/07/19  0830 03/29/19  0905 08/02/17  1502 08/10/16  1106 06/16/15  1010  05/14/13  1217   LDL  --   --   --   --  123* 96  --  96   HDL  --   --   --   --  69 67  --  68   TRIG  --   --   --   --  114 125  --  95   ALT 23  --  24 21  --   --   --   --    CR 0.86 0.76 0.63 0.60  --  0.67   < >  --    GFRESTIMATED 72 83 >90 >90  --  >90  Non  GFR Calc     < >  --    GFRESTBLACK 84 >90 >90 >90  --  >90  African American GFR Calc     < >  --    POTASSIUM 4.2 4.2  --  3.9  --  4.2   < >  --    TSH  --  4.02*  --   --  3.08 2.68   < >  --     < > = values in this interval not displayed.      BP Readings from Last 3 Encounters:   11/12/19 106/58   09/04/19 119/66   09/04/19 (!) 153/80    Wt Readings from Last 3 Encounters:   11/12/19 82.6 kg (182 lb)   09/04/19 83.6 kg (184 lb 4.8 oz)   05/07/19 84.4 kg (186 lb)            Reviewed and updated as needed this visit by Provider         Review of Systems   ROS COMP: Constitutional, HEENT, cardiovascular, pulmonary, GI,  ", musculoskeletal, neuro, skin, endocrine and psych systems are negative, except as otherwise noted.      Objective    /58 (BP Location: Left arm, Patient Position: Sitting, Cuff Size: Adult Large)   Pulse 67   Temp 96.9  F (36.1  C) (Tympanic)   Resp 16   Ht 1.6 m (5' 3\")   Wt 82.6 kg (182 lb)   LMP 02/14/2010   SpO2 96%   BMI 32.24 kg/m    Body mass index is 32.24 kg/m .  Physical Exam   GENERAL APPEARANCE: healthy, alert and no distress. Smiling.   SKIN: warm and dry  MS:    Left foot:  On the top of her left big toe there is an approximate 0.5cm diameter round, rubbery mass consistent with a cyst.  Toe ROM intact.     Right hand 4th finger palmar aspect with an approximate 0.5cm diameter round, rubbery mass consistent with a cyst.  ROM and strength intact.    PSYCH: mentation appears normal and affect normal/bright.  Good eye contact.      Diagnostic Test Results:  Labs reviewed in Epic        Assessment & Plan     (M25.841) Cyst of joint of right hand  (primary encounter diagnosis)  Comment:   Plan: ORTHOPEDICS ADULT REFERRAL, TRAVEL CLINIC         REFERRAL        She will follow up with ortho hand ASAP.     (Z13.1) Screening for diabetes mellitus  Comment:   Plan: Hemoglobin A1c, Glucose        Done today per the patient's request (eye doctor as well)    (L72.9) cyst of left great toe  Comment:   Plan: PODIATRY/FOOT & ANKLE SURGERY REFERRAL        Follow up with podiatry at earliest convenienc3    (Z71.84) Counseling for travel  Comment:   Plan: follow up with the travel clinic.     (J45.20) Mild intermittent asthma without complication  Comment: controlled  Plan: continue inhalers.  Follow up with me May 2020 for routine check up and asthma follow up, sooner prn.    (Z23) Need for prophylactic vaccination and inoculation against influenza  Comment:   Plan: INFLUENZA QUAD, RECOMBINANT, P-FREE (RIV4)         (FLUBLOCK) [11134], Vaccine Administration,         Initial [46033]        Given " "today    BMI:   Estimated body mass index is 32.24 kg/m  as calculated from the following:    Height as of this encounter: 1.6 m (5' 3\").    Weight as of this encounter: 82.6 kg (182 lb).     See Patient Instructions    Return in about 1 year (around 11/12/2020) for Physical Exam.    BC C[facility]vas [facility]        "

## 2019-11-13 ASSESSMENT — ASTHMA QUESTIONNAIRES: ACT_TOTALSCORE: 25

## 2019-11-13 NOTE — RESULT ENCOUNTER NOTE
Leandro Eugene,    This note is to let you know that your diabetes screening tests came back negative.  Let me know if you have any questions.    Lisbeth YANCEY CNP

## 2019-11-18 ENCOUNTER — OFFICE VISIT (OUTPATIENT)
Dept: PODIATRY | Facility: CLINIC | Age: 64
End: 2019-11-18
Payer: COMMERCIAL

## 2019-11-18 VITALS
DIASTOLIC BLOOD PRESSURE: 68 MMHG | SYSTOLIC BLOOD PRESSURE: 110 MMHG | HEIGHT: 64 IN | HEART RATE: 69 BPM | WEIGHT: 162 LBS | BODY MASS INDEX: 27.66 KG/M2

## 2019-11-18 DIAGNOSIS — M79.89 SOFT TISSUE MASS: Primary | ICD-10-CM

## 2019-11-18 DIAGNOSIS — M67.40 GANGLION CYST: ICD-10-CM

## 2019-11-18 PROCEDURE — 99243 OFF/OP CNSLTJ NEW/EST LOW 30: CPT | Performed by: PODIATRIST

## 2019-11-18 ASSESSMENT — MIFFLIN-ST. JEOR: SCORE: 1274.83

## 2019-11-18 NOTE — PATIENT INSTRUCTIONS
Thank you for choosing [facility]!    Follow up as needed    DR. MIRANDA'S CLINIC LOCATIONS     MONDAY  [facility] TUESDAY & FRIDAY AM  [place]   [address] [facility]   [address]   [place] [address] [place] [address]   [phone]  [phone]  [phone]       [facility]EDNES[facility]  [facility] [phone]   [address] [facility] APPOINTMENTS: [phone]   [place] [address] BILLING QUESTIONS: [phone]   [phone]       GANGLION CYST  A ganglion cyst is a sac filled with a jellylike fluid that originates from a tendon sheath or joint capsule. The word  ganglion  means  knot  and is used to describe the knot-like mass or lump that forms below the surface of the skin.  Ganglion cysts are among the most common benign soft-tissue masses. Although they most often occur on the wrist, they also frequently develop on the foot - usually on the top, but elsewhere as well. Ganglion cysts vary in size, may get smaller and larger, and may even disappear completely, only to return later.  CAUSES  Although the exact cause of ganglion cysts is unknown, they may arise from trauma - whether a single event or repetitive micro-trauma.  SYMPTOMS  A noticeable lump - often this is the only symptom experienced   Tingling or burning, if the cyst is touching a nerve   Dull pain or ache - which may indicate the cyst is pressing against a tendon or joint   Difficulty wearing shoes due to irritation between the lump and the shoe   DIAGNOSIS  To diagnose a ganglion cyst, the foot and ankle surgeon will perform a thorough examination of the foot. The lump will be visually apparent and, when pressed in a certain way, it should move freely underneath the skin. Sometimes the surgeon will shine a light through the cyst or remove a small amount of fluid from the cyst for evaluation. Your doctor may take an x-ray, and in some cases  additional imaging studies may be ordered.  NON-SURGICAL TREATMENT  Monitoring, but no treatment. If the cyst causes no pain and does not interfere with walking, the surgeon may decide it is best to carefully watch the cyst over a period of time.   Shoe modifications. Wearing shoes that do not rub the cyst or cause irritation may be advised. In addition, placing a pad inside the shoe may help reduce pressure against the cyst.   Aspiration and injection. This technique involves draining the fluid and then injecting a steroid medication into the mass. More than one session may be needed. Although this approach is successful in some cases, in many others the cyst returns.   SURGICAL TREATMENT  When other treatment options fail or are not appropriate, the cyst may need to be surgically removed. While the recurrence rate associated with surgery is much lower than that experienced with aspiration and injection therapy, there are nevertheless cases in which the ganglion cyst returns.    [facility] SCHEDULING  They should be calling you within 24 hours to schedule your scan.  If not, please call the location discussed at your appointment.    1) [facility]:       [phone]      [address] [place] [address]    2) [facility]:      [phone]      [address] [place] [address]    3) [facility]:       [phone]      [address] [place] [address]    * We will call you with the results. Please allow 24-48 hours for the results to be read and final.                  BODY WEIGHT AND YOUR FEET  The following information is included in the after visit summary for all patients. Body weight can be a sensitive issue to discuss in clinic, but we think the following information is very important. Although we focus on the feet and ankles, we do support the overall health of our patients.     Many things can cause foot and ankle  problems. Foot structure, activity level, foot mechanics and injuries are common causes of pain. One very important issue that often goes unmentioned, is body weight. Extra weight can cause increased stress on muscles, ligaments, bones and tendons. Sometimes just a few extra pounds is all it takes to put one over her/his threshold. Without reducing that stress, it can be difficult to alleviate pain. As Foot & Ankle specialists, our job is addressing the lower extremity problem and possible causes. Regarding extra body weight, we encourage patients to discuss diet and weight management plans with their primary care doctors. It is this team approach that gives you the best opportunity for pain relief and getting you back on your feet.      [place] has a Comprehensive Weight Management Program. This program includes counseling, education, non-surgical and surgical approaches to weight loss. If you are interested in learning more either talk to you primary care provider or call [phone].

## 2019-11-18 NOTE — PROGRESS NOTES
"PATIENT HISTORY:  Valorie Hahn is a 64 year old female who presents to clinic for left 1st toe \"bump.\"  Dorsal aspect.  8 month duration.  New finding.  No pain.  No treatments.    I was requested to see this patient for this issue by Lisbeth Ruiz CNP.    Review of Systems:  Patient denies fever, chills, rash, wound, stiffness, limping, numbness, weakness, heart burn, blood in stool, chest pain with activity, calf pain when walking, shortness of breath with activity, chronic cough, easy bleeding/bruising, swelling of ankles, excessive thirst, fatigue, depression, anxiety.       PAST MEDICAL HISTORY:   Past Medical History:   Diagnosis Date     Arthritis 2012    SI joint, osteoarthritis     Complication of anesthesia      Esophageal reflux      Genital herpes, unspecified      Kidney stone 09/2019     Mild intermittent asthma with exacerbation      PONV (postoperative nausea and vomiting)         PAST SURGICAL HISTORY:   Past Surgical History:   Procedure Laterality Date     C APPENDECTOMY       C NONSPECIFIC PROCEDURE      Anal fissure repair     COLONOSCOPY      some polyps removed     COLONOSCOPY WITH CO2 INSUFFLATION N/A 1/3/2019    Procedure: COLONOSCOPY WITH CO2 INSUFFLATION;  Surgeon: Arnold Adler MD;  Location: [facility] OR     HC TOOTH EXTRACTION W/FORCEP       PHACOEMULSIFICATION WITH STANDARD INTRAOCULAR LENS IMPLANT Left 3/1/2019    Procedure: Left Eye Cataract Removal with Intraocular Lens Implant;  Surgeon: Danna Garcia MD;  Location: [facility] OR     PHACOEMULSIFICATION WITH STANDARD INTRAOCULAR LENS IMPLANT Right 3/8/2019    Procedure: Right Eye Cataract Removal with Intraocular Lens Implant;  Surgeon: Danna Garcia MD;  Location: [facility] OR        MEDICATIONS:   Current Outpatient Medications:      acyclovir (ZOVIRAX) 400 MG tablet, Take 1 tablet (400 mg) by mouth 2 times daily For 3 days with each outbreak., Disp: 6 tablet, Rfl: 11     bromfenac (BROMDAY) 0.09 % ophthalmic " solution, Place 1 drop into the right eye daily, Disp: 5 mL, Rfl: 3     meloxicam (MOBIC) 7.5 MG tablet, Take 1 tablet (7.5 mg) by mouth daily, Disp: 30 tablet, Rfl: 11     ondansetron (ZOFRAN-ODT) 4 MG ODT tab, Take 1 tablet (4 mg) by mouth every 8 hours as needed for nausea (Patient not taking: Reported on 2019), Disp: 14 tablet, Rfl: 0     oxyCODONE (ROXICODONE) 5 MG tablet, Take 1 tablet (5 mg) by mouth every 6 hours as needed for pain (Patient not taking: Reported on 2019), Disp: 8 tablet, Rfl: 0     prednisoLONE acetate (PRED FORTE) 1 % ophthalmic suspension, Place 1 drop into the right eye 4 times daily (Patient not taking: Reported on 2019), Disp: 1 Bottle, Rfl: 3     UNABLE TO FIND, Place 1 drop into both eyes At Bedtime Serum tears, Disp: , Rfl:      ALLERGIES:    Allergies   Allergen Reactions     Penicillin G Hives        SOCIAL HISTORY:   Social History     Socioeconomic History     Marital status: Single     Spouse name: Not on file     Number of children: 0     Years of education: Not on file     Highest education level: Not on file   Occupational History     Occupation:      Employer: SELF   Social Needs     Financial resource strain: Not on file     Food insecurity:     Worry: Not on file     Inability: Not on file     Transportation needs:     Medical: Not on file     Non-medical: Not on file   Tobacco Use     Smoking status: Former Smoker     Packs/day: 0.50     Years: 10.00     Pack years: 5.00     Types: Cigarettes     Start date: 1990     Last attempt to quit: 2000     Years since quittin.4     Smokeless tobacco: Never Used   Substance and Sexual Activity     Alcohol use: Yes     Comment: 1-2 glasses of wine per night     Drug use: No     Sexual activity: Not Currently     Partners: Female   Lifestyle     Physical activity:     Days per week: Not on file     Minutes per session: Not on file     Stress: Not on file   Relationships     Social  connections:     Talks on phone: Not on file     Gets together: Not on file     Attends Yazidi service: Not on file     Active member of club or organization: Not on file     Attends meetings of clubs or organizations: Not on file     Relationship status: Not on file     Intimate partner violence:     Fear of current or ex partner: Not on file     Emotionally abused: Not on file     Physically abused: Not on file     Forced sexual activity: Not on file   Other Topics Concern      Service No     Blood Transfusions Yes     Caffeine Concern No     Occupational Exposure No     Hobby Hazards No     Sleep Concern No     Stress Concern No     Weight Concern Yes     Comment: gained 30 pounds in last 3 to 4 years     Special Diet No     Back Care Yes     Comment: pain in recent years     Exercise Yes     Comment: gym and bike riding     Bike Helmet Yes     Seat Belt Yes     Self-Exams Yes     Parent/sibling w/ CABG, MI or angioplasty before 65F 55M? No     Comment: Father, aged late 40's   Social History Narrative    Balanced Diet - Yes    Osteoporosis Prevention Measures - Dairy servings per day: 2 servings    Regular Exercise -  Yes Describe walks daily for 1 hour    Dental Exam - YES - Date: 6 months ago    Eye Exam - YES - Date: 5-09    Self Breast Exam - No    Abuse: Current or Past (Physical, Sexual or Emotional)- No    Do you feel safe in your environment - Yes    Guns stored in the home - No    Sunscreen used - Yes    Seatbelts used - Yes    Lipids -  YES - Date: 5-05    Glucose -  YES - Date: 8-05    Colon Cancer Screening - Colonoscopy 12-07(date completed)    Hemoccults -     Pap Test -  YES - Date: 6-08    Do you have any concerns about STD's -  No    Mammography - YES - Date: 9-08    DEXA - NO    Immunizations reviewed and up to date - Yes, td 7-05 6-11-99 BETTE Rodriguez CMA            FAMILY HISTORY:   Family History   Problem Relation Age of Onset     Respiratory Mother         emphysema     Mental  "Illness Mother      Thyroid Disease Mother      Thyroid Disease Mother         hypothyroid, takes supplement     Cerebrovascular Disease Mother      Depression Mother      Mental Illness Mother      Glaucoma Mother      Mental Illness Maternal Grandmother      Depression Maternal Grandmother      Diabetes Father         type 2     Heart Disease Father         bypass surgeries x 2     Other Cancer Father         non hodgkins     Skin Cancer Father      Thyroid Disease Sister         hypothyroid     Thyroid Disease Sister         hypothyroid     Psychotic Disorder Sister         commited suicide, depression     Cancer Brother         esophogeal cancer, work exposure to chemicals     Blood Disease Brother          hiv  aids     Blood Disease Brother         hep c     Blood Disease Brother         hiv positive, passed away     Family History Negative Brother      Diabetes Brother      Mental Illness Sister      Thyroid Disease Sister      Thyroid Disease Sister      Diabetes Brother         type 2     Depression Sister      Depression Sister      Depression Brother      Melanoma No family hx of      Macular Degeneration No family hx of         EXAM:Vitals: /68   Pulse 69   Ht 1.626 m (5' 4\")   Wt 73.5 kg (162 lb)   LMP 2010   BMI 27.81 kg/m    BMI= Body mass index is 27.81 kg/m .    General appearance: Patient is alert and fully cooperative with history & exam.  No sign of distress is noted during the visit.     Psychiatric: Affect is pleasant & appropriate.  Patient appears motivated to improve health.     Respiratory: Breathing is regular & unlabored while sitting.     HEENT: Hearing is intact to spoken word.  Speech is clear.  No gross evidence of visual impairment that would impact ambulation.     Dermatologic: Skin is intact to L foot without significant lesions, rash or abrasion.  No paronychia or evidence of soft tissue infection is noted.     Vascular: DP & PT pulses are intact & regular on " the L.  No significant edema or varicosities noted.  CFT and skin temperature are normal to both lower extremities.     Neurologic: Lower extremity sensation is intact to light touch.  No evidence of weakness or contracture in the lower extremities.  No evidence of neuropathy.     Musculoskeletal: L dorsal 1st toe between MPJ and IPJ with <1cm firm circumscribed soft tissue mass.  Likely ganglion.  Patient is ambulatory without assistive device or brace.  No gross ankle deformity noted.  No foot or ankle joint effusion is noted.     ASSESSMENT: L 1st toe soft tissue mass, suspect ganglion cyst     PLAN:  Reviewed patient's chart in epic.  Discussed condition and treatment options including pros and cons.    Likely benign ganglion.  Other etiology possible.  Discussed options including aspiration, surgical removal.  Not painful, so no intervention required.    I did advise imaging to confirm cyst.    Pt will proceed with MRI for workup.  We will call with results.    Aaron Moore DPM, FACFAS    Weight management plan: Patient was referred to their PCP to discuss a diet and exercise plan.

## 2019-11-18 NOTE — LETTER
"    11/18/2019         RE: Valorie Hahn  [address]  [facility] [address]        Dear Colleague,    Thank you for referring your patient, Valorie Hahn, to the [facility]. Please see a copy of my visit note below.    PATIENT HISTORY:  Valorie Hahn is a 64 year old female who presents to clinic for left 1st toe \"bump.\"  Dorsal aspect.  8 month duration.  New finding.  No pain.  No treatments.    I was requested to see this patient for this issue by Lisbeth Ruiz CNP.    Review of Systems:  Patient denies fever, chills, rash, wound, stiffness, limping, numbness, weakness, heart burn, blood in stool, chest pain with activity, calf pain when walking, shortness of breath with activity, chronic cough, easy bleeding/bruising, swelling of ankles, excessive thirst, fatigue, depression, anxiety.       PAST MEDICAL HISTORY:   Past Medical History:   Diagnosis Date     Arthritis 2012    SI joint, osteoarthritis     Complication of anesthesia      Esophageal reflux      Genital herpes, unspecified      Kidney stone 09/2019     Mild intermittent asthma with exacerbation      PONV (postoperative nausea and vomiting)         PAST SURGICAL HISTORY:   Past Surgical History:   Procedure Laterality Date     C APPENDECTOMY       C NONSPECIFIC PROCEDURE      Anal fissure repair     COLONOSCOPY      some polyps removed     COLONOSCOPY WITH CO2 INSUFFLATION N/A 1/3/2019    Procedure: COLONOSCOPY WITH CO2 INSUFFLATION;  Surgeon: Arnold Adler MD;  Location: [facility] OR     HC TOOTH EXTRACTION W/FORCEP       PHACOEMULSIFICATION WITH STANDARD INTRAOCULAR LENS IMPLANT Left 3/1/2019    Procedure: Left Eye Cataract Removal with Intraocular Lens Implant;  Surgeon: Danna Garcia MD;  Location: [facility] OR     PHACOEMULSIFICATION WITH STANDARD INTRAOCULAR LENS IMPLANT Right 3/8/2019    Procedure: Right Eye Cataract Removal with Intraocular Lens Implant;  Surgeon: Jose" Danna GAN MD;  Location: [facility] OR        MEDICATIONS:   Current Outpatient Medications:      acyclovir (ZOVIRAX) 400 MG tablet, Take 1 tablet (400 mg) by mouth 2 times daily For 3 days with each outbreak., Disp: 6 tablet, Rfl: 11     bromfenac (BROMDAY) 0.09 % ophthalmic solution, Place 1 drop into the right eye daily, Disp: 5 mL, Rfl: 3     meloxicam (MOBIC) 7.5 MG tablet, Take 1 tablet (7.5 mg) by mouth daily, Disp: 30 tablet, Rfl: 11     ondansetron (ZOFRAN-ODT) 4 MG ODT tab, Take 1 tablet (4 mg) by mouth every 8 hours as needed for nausea (Patient not taking: Reported on 2019), Disp: 14 tablet, Rfl: 0     oxyCODONE (ROXICODONE) 5 MG tablet, Take 1 tablet (5 mg) by mouth every 6 hours as needed for pain (Patient not taking: Reported on 2019), Disp: 8 tablet, Rfl: 0     prednisoLONE acetate (PRED FORTE) 1 % ophthalmic suspension, Place 1 drop into the right eye 4 times daily (Patient not taking: Reported on 2019), Disp: 1 Bottle, Rfl: 3     UNABLE TO FIND, Place 1 drop into both eyes At Bedtime Serum tears, Disp: , Rfl:      ALLERGIES:    Allergies   Allergen Reactions     Penicillin G Hives        SOCIAL HISTORY:   Social History     Socioeconomic History     Marital status: Single     Spouse name: Not on file     Number of children: 0     Years of education: Not on file     Highest education level: Not on file   Occupational History     Occupation:      Employer: SELF   Social Needs     Financial resource strain: Not on file     Food insecurity:     Worry: Not on file     Inability: Not on file     Transportation needs:     Medical: Not on file     Non-medical: Not on file   Tobacco Use     Smoking status: Former Smoker     Packs/day: 0.50     Years: 10.00     Pack years: 5.00     Types: Cigarettes     Start date: 1990     Last attempt to quit: 2000     Years since quittin.4     Smokeless tobacco: Never Used   Substance and Sexual Activity     Alcohol use: Yes      Comment: 1-2 glasses of wine per night     Drug use: No     Sexual activity: Not Currently     Partners: Female   Lifestyle     Physical activity:     Days per week: Not on file     Minutes per session: Not on file     Stress: Not on file   Relationships     Social connections:     Talks on phone: Not on file     Gets together: Not on file     Attends Gnosticism service: Not on file     Active member of club or organization: Not on file     Attends meetings of clubs or organizations: Not on file     Relationship status: Not on file     Intimate partner violence:     Fear of current or ex partner: Not on file     Emotionally abused: Not on file     Physically abused: Not on file     Forced sexual activity: Not on file   Other Topics Concern      Service No     Blood Transfusions Yes     Caffeine Concern No     Occupational Exposure No     Hobby Hazards No     Sleep Concern No     Stress Concern No     Weight Concern Yes     Comment: gained 30 pounds in last 3 to 4 years     Special Diet No     Back Care Yes     Comment: pain in recent years     Exercise Yes     Comment: gym and bike riding     Bike Helmet Yes     Seat Belt Yes     Self-Exams Yes     Parent/sibling w/ CABG, MI or angioplasty before 65F 55M? No     Comment: Father, aged late 40's   Social History Narrative    Balanced Diet - Yes    Osteoporosis Prevention Measures - Dairy servings per day: 2 servings    Regular Exercise -  Yes Describe walks daily for 1 hour    Dental Exam - YES - Date: 6 months ago    Eye Exam - YES - Date: 5-09    Self Breast Exam - No    Abuse: Current or Past (Physical, Sexual or Emotional)- No    Do you feel safe in your environment - Yes    Guns stored in the home - No    Sunscreen used - Yes    Seatbelts used - Yes    Lipids -  YES - Date: 5-05    Glucose -  YES - Date: 8-05    Colon Cancer Screening - Colonoscopy 12-07(date completed)    Hemoccults -     Pap Test -  YES - Date: 6-08    Do you have any concerns about  "STD's -  No    Mammography - YES - Date:     DEXA - NO    Immunizations reviewed and up to date - Yes, td 7-99 BETTE Rodriguez CMA            FAMILY HISTORY:   Family History   Problem Relation Age of Onset     Respiratory Mother         emphysema     Mental Illness Mother      Thyroid Disease Mother      Thyroid Disease Mother         hypothyroid, takes supplement     Cerebrovascular Disease Mother      Depression Mother      Mental Illness Mother      Glaucoma Mother      Mental Illness Maternal Grandmother      Depression Maternal Grandmother      Diabetes Father         type 2     Heart Disease Father         bypass surgeries x 2     Other Cancer Father         non hodgkins     Skin Cancer Father      Thyroid Disease Sister         hypothyroid     Thyroid Disease Sister         hypothyroid     Psychotic Disorder Sister         commited suicide, depression     Cancer Brother         esophogeal cancer, work exposure to chemicals     Blood Disease Brother          hiv  aids     Blood Disease Brother         hep c     Blood Disease Brother         hiv positive, passed away     Family History Negative Brother      Diabetes Brother      Mental Illness Sister      Thyroid Disease Sister      Thyroid Disease Sister      Diabetes Brother         type 2     Depression Sister      Depression Sister      Depression Brother      Melanoma No family hx of      Macular Degeneration No family hx of         EXAM:Vitals: /68   Pulse 69   Ht 1.626 m (5' 4\")   Wt 73.5 kg (162 lb)   LMP 2010   BMI 27.81 kg/m     BMI= Body mass index is 27.81 kg/m .    General appearance: Patient is alert and fully cooperative with history & exam.  No sign of distress is noted during the visit.     Psychiatric: Affect is pleasant & appropriate.  Patient appears motivated to improve health.     Respiratory: Breathing is regular & unlabored while sitting.     HEENT: Hearing is intact to spoken word.  Speech is clear.  No " gross evidence of visual impairment that would impact ambulation.     Dermatologic: Skin is intact to L foot without significant lesions, rash or abrasion.  No paronychia or evidence of soft tissue infection is noted.     Vascular: DP & PT pulses are intact & regular on the L.  No significant edema or varicosities noted.  CFT and skin temperature are normal to both lower extremities.     Neurologic: Lower extremity sensation is intact to light touch.  No evidence of weakness or contracture in the lower extremities.  No evidence of neuropathy.     Musculoskeletal: L dorsal 1st toe between MPJ and IPJ with <1cm firm circumscribed soft tissue mass.  Likely ganglion.  Patient is ambulatory without assistive device or brace.  No gross ankle deformity noted.  No foot or ankle joint effusion is noted.     ASSESSMENT: L 1st toe soft tissue mass, suspect ganglion cyst     PLAN:  Reviewed patient's chart in epic.  Discussed condition and treatment options including pros and cons.    Likely benign ganglion.  Other etiology possible.  Discussed options including aspiration, surgical removal.  Not painful, so no intervention required.    I did advise imaging to confirm cyst.    Pt will proceed with MRI for workup.  We will call with results.    Aaron Moore DPM, FACFAS    Weight management plan: Patient was referred to their PCP to discuss a diet and exercise plan.      Again, thank you for allowing me to participate in the care of your patient.        Sincerely,        Aaron Moore DPM

## 2019-11-21 NOTE — TELEPHONE ENCOUNTER
DIAGNOSIS: Hand consult for cyst on ring finger joint - Internal referral Joseph YANCEY CNP    APPOINTMENT DATE: 11/26/19   NOTES STATUS DETAILS   OFFICE NOTE from referring provider Internal OV note 11/12/19    OFFICE NOTE from other specialist N/A    DISCHARGE SUMMARY from hospital N/A    DISCHARGE REPORT from the ER N/A    OPERATIVE REPORT N/A    MEDICATION LIST N/A    MRI N/A    CT SCAN N/A    XRAYS (IMAGES & REPORTS) N/A

## 2019-11-26 ENCOUNTER — OFFICE VISIT (OUTPATIENT)
Dept: ORTHOPEDICS | Facility: CLINIC | Age: 64
End: 2019-11-26
Attending: NURSE PRACTITIONER
Payer: COMMERCIAL

## 2019-11-26 ENCOUNTER — PRE VISIT (OUTPATIENT)
Dept: ORTHOPEDICS | Facility: CLINIC | Age: 64
End: 2019-11-26

## 2019-11-26 VITALS — HEIGHT: 64 IN | BODY MASS INDEX: 27.66 KG/M2 | WEIGHT: 162 LBS | RESPIRATION RATE: 16 BRPM

## 2019-11-26 DIAGNOSIS — R22.31 FINGER MASS, RIGHT: Primary | ICD-10-CM

## 2019-11-26 ASSESSMENT — MIFFLIN-ST. JEOR: SCORE: 1274.83

## 2019-11-26 NOTE — TELEPHONE ENCOUNTER
DIAGNOSIS: : Finger mass, right. Mary Canada referring   APPOINTMENT DATE: 12.2.19   NOTES STATUS DETAILS   OFFICE NOTE from referring provider Internal 11.26.19 Dr. Canada   OFFICE NOTE from other specialist N/A    DISCHARGE SUMMARY from hospital N/A    DISCHARGE REPORT from the ER N/A    OPERATIVE REPORT N/A    MEDICATION LIST Internal    IMPLANT RECORD/STICKER N/A    LABS     CBC/DIFF Internal 9.4.19   CULTURES N/A    INJECTIONS DONE IN RADIOLOGY N/A    MRI N/A    CT SCAN N/A    XRAYS (IMAGES & REPORTS) N/A    TUMOR     PATHOLOGY  Slides & report N/A

## 2019-11-26 NOTE — LETTER
"  11/26/2019      RE: Valorie Hahn  [address]  [facility] [address]       Sports Medicine Clinic Visit    PCP: Lisbeth Ruiz    Valorie Hahn is a 64 year old female who is seen  in consultation at the request of Dr. Ruiz presenting with Right ring finger cyst on the volar aspect of hand at the base of the metacarpal. She is retired. She spends time keyboarding.    Injury: NA    Location of Pain: right ring finger  Duration of Pain: 1 year(s)  Rating of Pain: 2/10  Pain is better with: Nothing  Pain is worse with: Pressure on area  Additional Features: RHD  Treatment so far consists of: spinting  Prior History of related problems: None     Resp 16   Ht 1.626 m (5' 4\")   Wt 73.5 kg (162 lb)   LMP 02/14/2010   BMI 27.81 kg/m            PMH:  Past Medical History:   Diagnosis Date     Arthritis 2012    SI joint, osteoarthritis     Complication of anesthesia      Esophageal reflux      Genital herpes, unspecified      Kidney stone 09/2019     Mild intermittent asthma with exacerbation      PONV (postoperative nausea and vomiting)        Active problem list:  Patient Active Problem List   Diagnosis     Herpes simplex vulvovaginitis     Mild intermittent asthma without complication     CARDIOVASCULAR SCREENING; LDL GOAL LESS THAN 160     Family history of thyroid disease     Advanced directives, counseling/discussion     SI (sacroiliac) joint dysfunction     Osteitis, condensans     Sacroiliac joint disease     SI (sacroiliac) pain     Colon polyps     Dyspnea     Pain in joint involving ankle and foot, right     Cataract of both eyes, unspecified cataract type     Posterior vitreous detachment     CME (cystoid macular edema), right     Cystoid macular edema, left eye       FH:  Family History   Problem Relation Age of Onset     Respiratory Mother         emphysema     Mental Illness Mother      Thyroid Disease Mother      Thyroid Disease Mother         hypothyroid, " takes supplement     Cerebrovascular Disease Mother      Depression Mother      Mental Illness Mother      Glaucoma Mother      Mental Illness Maternal Grandmother      Depression Maternal Grandmother      Diabetes Father         type 2     Heart Disease Father         bypass surgeries x 2     Other Cancer Father         non hodgkins     Skin Cancer Father      Thyroid Disease Sister         hypothyroid     Thyroid Disease Sister         hypothyroid     Psychotic Disorder Sister         commited suicide, depression     Cancer Brother         esophogeal cancer, work exposure to chemicals     Blood Disease Brother          hiv  aids     Blood Disease Brother         hep c     Blood Disease Brother         hiv positive, passed away     Family History Negative Brother      Diabetes Brother      Mental Illness Sister      Thyroid Disease Sister      Thyroid Disease Sister      Diabetes Brother         type 2     Depression Sister      Depression Sister      Depression Brother      Melanoma No family hx of      Macular Degeneration No family hx of        SH:  Social History     Socioeconomic History     Marital status: Single     Spouse name: Not on file     Number of children: 0     Years of education: Not on file     Highest education level: Not on file   Occupational History     Occupation:      Employer: SELF   Social Needs     Financial resource strain: Not on file     Food insecurity:     Worry: Not on file     Inability: Not on file     Transportation needs:     Medical: Not on file     Non-medical: Not on file   Tobacco Use     Smoking status: Former Smoker     Packs/day: 0.50     Years: 10.00     Pack years: 5.00     Types: Cigarettes     Start date: 1990     Last attempt to quit: 2000     Years since quittin.5     Smokeless tobacco: Never Used   Substance and Sexual Activity     Alcohol use: Yes     Comment: 1-2 glasses of wine per night     Drug use: No     Sexual activity: Not  Currently     Partners: Female   Lifestyle     Physical activity:     Days per week: Not on file     Minutes per session: Not on file     Stress: Not on file   Relationships     Social connections:     Talks on phone: Not on file     Gets together: Not on file     Attends Judaism service: Not on file     Active member of club or organization: Not on file     Attends meetings of clubs or organizations: Not on file     Relationship status: Not on file     Intimate partner violence:     Fear of current or ex partner: Not on file     Emotionally abused: Not on file     Physically abused: Not on file     Forced sexual activity: Not on file   Other Topics Concern      Service No     Blood Transfusions Yes     Caffeine Concern No     Occupational Exposure No     Hobby Hazards No     Sleep Concern No     Stress Concern No     Weight Concern Yes     Comment: gained 30 pounds in last 3 to 4 years     Special Diet No     Back Care Yes     Comment: pain in recent years     Exercise Yes     Comment: gym and bike riding     Bike Helmet Yes     Seat Belt Yes     Self-Exams Yes     Parent/sibling w/ CABG, MI or angioplasty before 65F 55M? No     Comment: Father, aged late 40's   Social History Narrative    Balanced Diet - Yes    Osteoporosis Prevention Measures - Dairy servings per day: 2 servings    Regular Exercise -  Yes Describe walks daily for 1 hour    Dental Exam - YES - Date: 6 months ago    Eye Exam - YES - Date: 5-09    Self Breast Exam - No    Abuse: Current or Past (Physical, Sexual or Emotional)- No    Do you feel safe in your environment - Yes    Guns stored in the home - No    Sunscreen used - Yes    Seatbelts used - Yes    Lipids -  YES - Date: 5-05    Glucose -  YES - Date: 8-05    Colon Cancer Screening - Colonoscopy 12-07(date completed)    Hemoccults -     Pap Test -  YES - Date: 6-08    Do you have any concerns about STD's -  No    Mammography - YES - Date: 9-08    DEXA - NO    Immunizations reviewed  and up to date - Yes, td 7-05    6-11-99 BETTE Rodriguez [facility]           MEDS:  See EMR, reviewed  ALL:  See EMR, reviewed    REVIEW OF SYSTEMS:  CONSTITUTIONAL:NEGATIVE for fever, chills, change in weight  INTEGUMENTARY/SKIN: NEGATIVE for worrisome rashes, moles or lesions  EYES: NEGATIVE for vision changes or irritation  ENT/MOUTH: NEGATIVE for ear, mouth and throat problems  RESP:NEGATIVE for significant cough or SOB  BREAST: NEGATIVE for masses, tenderness or discharge  CV: NEGATIVE for chest pain, palpitations or peripheral edema  GI: NEGATIVE for nausea, abdominal pain, heartburn, or change in bowel habits  :NEGATIVE for frequency, dysuria, or hematuria  :NEGATIVE for frequency, dysuria, or hematuria  NEURO: NEGATIVE for weakness, dizziness or paresthesias  ENDOCRINE: NEGATIVE for temperature intolerance, skin/hair henning  HEME/ALLERGY/IMMUNE: NEGATIVE for bleeding problems  PSYCHIATRIC: NEGATIVE for changes in mood or affect    Subjective: This 64-year-old female has soft tissue mass that seems consistent with a ganglion cyst along the volar surface of her right ring finger that has been increasing in size over the last 11 months.  Got to the point that it bothers her consistently with gripping and grasping.    Objective she has a firm, well-circumscribed, uniform cystic mass the size of a BB along the volar surface of the finger.  It does not affect the function of the finger.  She has normal strength independently to flexion at the PIP DIP and MCP.  Is nontender to the touch.  Overlying skin is intact.  No signs of contracture.  No effusion at the MCPs.  Appropriate in conversation and affect.    Assessment finger soft tissue mass suspect ganglion cyst    Plan: She would like this definitively removed.  Referral to hand surgery was placed.        Grant Canada MD

## 2019-11-27 ENCOUNTER — MYC MEDICAL ADVICE (OUTPATIENT)
Dept: FAMILY MEDICINE | Facility: CLINIC | Age: 64
End: 2019-11-27

## 2019-11-27 ENCOUNTER — VIRTUAL VISIT (OUTPATIENT)
Dept: FAMILY MEDICINE | Facility: OTHER | Age: 64
End: 2019-11-27

## 2019-11-27 NOTE — PROGRESS NOTES
"Date: 2019 13:17:54  Clinician: Jong Lugo  Clinician NPI: [id number]  Patient: connie lynn  Patient : 1955  Patient Address: [address] [place] [id number]  Patient Phone: [phone]  Visit Protocol: UTI  Patient Summary:  connie is a 64 year old ( : 1955 ) female who initiated a Visit for a presumed bladder infection. When asked the question \"Please sign me up to receive news, health information and promotions. \", connie responded \"No\".   Her symptoms started 1-3 days ago and consist of dysuria, urgency, and urinary frequency.   Symptom details   Urine color: The color of her urine is yellow.    Denied symptoms include vaginal discharge, urinary incontinence, vomiting, vaginal itching, foul-smelling urine, nausea, feeling as if the bladder is never empty, flank pain, abdominal pain, and chills. She does not feel feverish.   connie has not used any over-the-counter medications or home remedies to relieve her current symptoms.  Precipitating events  connie denies having a sexually transmitted disease.  Pertinent medical history  connie has had a bladder infection before but has not had any in the past 12 months. Her current symptoms are similar to her previous bladder infection symptoms.   She is not sure what antibiotics have been effective in treating her past bladder infections.   She has a history of kidney stones. Her last episode was more than 6 months ago.   connie has not been prescribed antibiotics to prevent frequent or repeated bladder infections in the past and does not get yeast infections when she takes antibiotics. She has not experienced problems or side effects with any of the common antibiotics used to treat bladder infections.   She has not used a catheter or been a patient in a hospital or nursing home in the past 2 weeks.   connie does not smoke or use smokeless tobacco.     MEDICATIONS: No current medications, ALLERGIES: " Penicillins  Clinician Response:  Dear connie,  Based on the information you have provided, you likely have an acute urinary tract infection, also called a bladder infection. Bladder infections occur when bacteria from the outside of the body enters the urinary tract. Any part of the urinary system can be infected, but the bladder is the most common.  Medication information  I am prescribing:     Sulfamethoxazole-trimethoprim (Bactrim DS) 800-160 mg oral tablet. Take 1 tablet by mouth every 12 hours for 3 days. There are no refills with this prescription.   Certain antibiotics such as Bactrim and Ciprofloxacin can cause your skin to be more sensitive to the sun. Exposure to the sun when taking these antibiotics may cause skin rash, itching, redness, or a severe sunburn. Be sure to avoid prolonged or direct exposure to the sun, especially between 10 am and 3 pm, if possible. Wear protective clothing and use sunscreen of SPF 30 or higher when you are outdoors.  The medication I prescribed for your bladder infection is an antibiotic. Continue taking the medication until it is gone even if you feel better. If you get an upset stomach while taking antibiotics, taking the medication with food can help.   Yeast infections can be a common side effect of antibiotics. The most common symptom of a yeast infection is itchiness in and around the vagina. Other signs and symptoms include burning, redness, or a thick, white vaginal discharge that looks like cottage cheese and does not have a bad smell.  Self care  Urination helps to flush bacteria from the urinary tract. For this reason, drinking water and urinating often helps relieve some urinary symptoms and can decrease your risk of getting bladder infections in the future.  Other steps you can take to prevent future bladder infections include:     Wipe front to back after using the bathroom    Urinate after sexual intercourse    Avoid using deodorant sprays, douches, or  powders in the vaginal area     When to seek care  Please make an appointment to be seen in a clinic or urgent care if any of the following occur:     You develop new symptoms or your symptoms become worse    You have medication side effects that make it difficult to take them as prescribed    Your symptoms do not improve within 1-2 days of starting treatment    You have symptoms of a bladder infection that return shortly after completing treatment     It is possible to have an allergic reaction to an antibiotic even if you have not had one in the past. If you notice a new rash, significant swelling, or difficulty breathing, stop taking this medication immediately and go to a clinic or urgent care.   Diagnosis: Acute uncomplicated bladder infection  Diagnosis ICD: N39.0  Prescription: sulfamethoxazole-trimethoprim (Bactrim DS) 800-160 mg oral tablet 6 tablet, 3 days supply. Take 1 tablet by mouth every 12 hours for 3 days. Refills: 0, Refill as needed: no, Allow substitutions: yes  Pharmacy: [id number] - [phone] - [address] [place] [address]

## 2019-11-29 DIAGNOSIS — R22.31 FINGER MASS, RIGHT: Primary | ICD-10-CM

## 2019-12-02 ENCOUNTER — ANCILLARY PROCEDURE (OUTPATIENT)
Dept: GENERAL RADIOLOGY | Facility: CLINIC | Age: 64
End: 2019-12-02
Attending: ORTHOPAEDIC SURGERY
Payer: COMMERCIAL

## 2019-12-02 ENCOUNTER — OFFICE VISIT (OUTPATIENT)
Dept: ORTHOPEDICS | Facility: CLINIC | Age: 64
End: 2019-12-02
Attending: FAMILY MEDICINE
Payer: COMMERCIAL

## 2019-12-02 ENCOUNTER — PRE VISIT (OUTPATIENT)
Dept: ORTHOPEDICS | Facility: CLINIC | Age: 64
End: 2019-12-02

## 2019-12-02 DIAGNOSIS — R22.31 FINGER MASS, RIGHT: ICD-10-CM

## 2019-12-02 DIAGNOSIS — R22.31 FINGER MASS, RIGHT: Primary | ICD-10-CM

## 2019-12-02 NOTE — NURSING NOTE
Reason For Visit:   Chief Complaint   Patient presents with     Consult For     Mass of right ring finger       Primary MD: Lisbeth Ruiz    ?  No    Age: 64 year old    Date of surgery: NA  Type of surgery: NA.  Smoker: No  Request smoking cessation information: No      LMP 02/14/2010       Pain Assessment  Patient Currently in Pain: Denies               QuickDASH Assessment  No flowsheet data found.       Allergies   Allergen Reactions     Penicillin G Hives       Jihan De Leon, ATC

## 2019-12-02 NOTE — LETTER
2019       RE: Valorie Hahn  [address]  [facility] [address]     Dear Colleague,    Thank you for referring your patient, Valorie Hahn, to the [facility] at [facility]. Please see a copy of my visit note below.    [facility]  Orthopedics  Karel Valle MD  2019     Name: Valorie Hahn  MRN: [id number]  Age: 64 year old  : 1955  Referring provider: Grant Canada     Chief Complaint: Consult For (Mass of right ring finger)    History of Present Illness:   Valorie Hahn is a 64 year old female who presents today for evaluation of a right ring finger mass. Here the patient states that this mass has been present and increasing in size over the last year. No clear h/o trauma. She notes that she has pain when she is grabbing objects and putting direct pressure on the area. She has had no locking or catching.     Review of Systems:   A 10-point review of systems was obtained and is negative except for as noted in the HPI.     Medications:     Current Outpatient Medications:      acyclovir (ZOVIRAX) 400 MG tablet, Take 1 tablet (400 mg) by mouth 2 times daily For 3 days with each outbreak., Disp: 6 tablet, Rfl: 11     bromfenac (BROMDAY) 0.09 % ophthalmic solution, Place 1 drop into the right eye daily, Disp: 5 mL, Rfl: 3     meloxicam (MOBIC) 7.5 MG tablet, Take 1 tablet (7.5 mg) by mouth daily, Disp: 30 tablet, Rfl: 11     UNABLE TO FIND, Place 1 drop into both eyes At Bedtime Serum tears, Disp: , Rfl:      ondansetron (ZOFRAN-ODT) 4 MG ODT tab, Take 1 tablet (4 mg) by mouth every 8 hours as needed for nausea (Patient not taking: Reported on 2019), Disp: 14 tablet, Rfl: 0     oxyCODONE (ROXICODONE) 5 MG tablet, Take 1 tablet (5 mg) by mouth every 6 hours as needed for pain (Patient not taking: Reported on 2019), Disp: 8 tablet, Rfl: 0     prednisoLONE acetate (PRED FORTE) 1 %  ophthalmic suspension, Place 1 drop into the right eye 4 times daily (Patient not taking: Reported on 11/12/2019), Disp: 1 Bottle, Rfl: 3    Allergies:  Penicillin g     Past Medical History:  Arthritis   GERD  Genital herpes  Asthma  PONV    Past Surgical History:  Appendectomy  Tooth extraction  Bilateral cataract removal     Social History:  She denies tobacco use and admits to occasional alcohol use.   Denies drug use.    Family History:  No pertinent family history    Physical Examination:  Well developed, well nourished, in no acute distress. Follow instructions appropriately. Alert and oriented to surroundings.   On examination of the right upper extremity:  Skin clean, dry and intact.   No deformity present.  No sensory or motor deficits noted.   Flexes and extends all digits and thumb without difficulty.  Fingers are warm and well perfused, capillary refill < 2 seconds.   Pea-sized well circumsized mass at the base of the ring finger proximal phalanx located volarly just radial to midline.   Mass is not mobile, is Tender to the touch.  No palpable triggering.     Imaging:   Radiographs of the right finger - 2 views (12/02/2019)  Unremarkable     Assessment:   64 year old female with a right ring finger mass. Suspect retinacular ganglion. Treatment options including observation vs. aspiration vs surgical excision were discussed. She prefers surgical excision. I discussed the risks and benefits of surgery, including but not limited to: infection, bleeding, pain, scarring, damage to nerves, blood vessels, or other nearby structures,   Recurrence,  stiffness, need for further surgery, and wound healing issues. She expressed understanding and informed consent was obtained.      Plan:   -Scheduled for surgery of a right finger suspected ganglion at a mutually convenient time    Karel Valle MD    Scribe Disclosure:  Victor Manuel ARNETT, am serving as a scribe to document services personally performed by Karel  BITA Valle MD at this visit, based upon the provider's statements to me. All documentation has been reviewed by the aforementioned provider prior to being entered into the official medical record.

## 2019-12-02 NOTE — PROGRESS NOTES
[facility]  Orthopedics  Karel Valle MD  2019     Name: Valorie Hahn  MRN: [id number]  Age: 64 year old  : 1955  Referring provider: Grant Canada     Chief Complaint: Consult For (Mass of right ring finger)    History of Present Illness:   Valorie Hahn is a 64 year old female who presents today for evaluation of a right ring finger mass. Here the patient states that this mass has been present and increasing in size over the last year. No clear h/o trauma. She notes that she has pain when she is grabbing objects and putting direct pressure on the area. She has had no locking or catching.     Review of Systems:   A 10-point review of systems was obtained and is negative except for as noted in the HPI.     Medications:     Current Outpatient Medications:      acyclovir (ZOVIRAX) 400 MG tablet, Take 1 tablet (400 mg) by mouth 2 times daily For 3 days with each outbreak., Disp: 6 tablet, Rfl: 11     bromfenac (BROMDAY) 0.09 % ophthalmic solution, Place 1 drop into the right eye daily, Disp: 5 mL, Rfl: 3     meloxicam (MOBIC) 7.5 MG tablet, Take 1 tablet (7.5 mg) by mouth daily, Disp: 30 tablet, Rfl: 11     UNABLE TO FIND, Place 1 drop into both eyes At Bedtime Serum tears, Disp: , Rfl:      ondansetron (ZOFRAN-ODT) 4 MG ODT tab, Take 1 tablet (4 mg) by mouth every 8 hours as needed for nausea (Patient not taking: Reported on 2019), Disp: 14 tablet, Rfl: 0     oxyCODONE (ROXICODONE) 5 MG tablet, Take 1 tablet (5 mg) by mouth every 6 hours as needed for pain (Patient not taking: Reported on 2019), Disp: 8 tablet, Rfl: 0     prednisoLONE acetate (PRED FORTE) 1 % ophthalmic suspension, Place 1 drop into the right eye 4 times daily (Patient not taking: Reported on 2019), Disp: 1 Bottle, Rfl: 3    Allergies:  Penicillin g     Past Medical History:  Arthritis   GERD  Genital herpes  Asthma  PONV    Past Surgical History:  Appendectomy  Tooth extraction  Bilateral  cataract removal     Social History:  She denies tobacco use and admits to occasional alcohol use.   Denies drug use.    Family History:  No pertinent family history    Physical Examination:  Well developed, well nourished, in no acute distress. Follow instructions appropriately. Alert and oriented to surroundings.   On examination of the right upper extremity:  Skin clean, dry and intact.   No deformity present.  No sensory or motor deficits noted.   Flexes and extends all digits and thumb without difficulty.  Fingers are warm and well perfused, capillary refill < 2 seconds.   Pea-sized well circumsized mass at the base of the ring finger proximal phalanx located volarly just radial to midline.   Mass is not mobile, is Tender to the touch.  No palpable triggering.     Imaging:   Radiographs of the right finger - 2 views (12/02/2019)  Unremarkable     Assessment:   64 year old female with a right ring finger mass. Suspect retinacular ganglion. Treatment options including observation vs. aspiration vs surgical excision were discussed. She prefers surgical excision. I discussed the risks and benefits of surgery, including but not limited to: infection, bleeding, pain, scarring, damage to nerves, blood vessels, or other nearby structures,   Recurrence,  stiffness, need for further surgery, and wound healing issues. She expressed understanding and informed consent was obtained.      Plan:   -Scheduled for surgery of a right finger suspected ganglion at a mutually convenient time    Karel Valle MD      Scribe Disclosure:  I, Victo rManuel Coleman, am serving as a scribe to document services personally performed by Karel Valle MD at this visit, based upon the provider's statements to me. All documentation has been reviewed by the aforementioned provider prior to being entered into the official medical record.

## 2019-12-02 NOTE — NURSING NOTE
Teaching Flowsheet   Relevant Diagnosis: Right ring finger mass  Teaching Topic: Right ring finger mass excision    CSC under local anesthetic with Dr Karel Valle  Consent obtained  BMI 27.81       Person(s) involved in teaching:   Patient     Motivation Level:  Asks Questions: Yes  Eager to Learn: Yes  Cooperative: Yes  Receptive (willing/able to accept information): Yes  Any cultural factors/Yazdanism beliefs that may influence understanding or compliance? No       Patient demonstrates understanding of the following:  Reason for the appointment, diagnosis and treatment plan: Yes  Knowledge of proper use of medications and conditions for which they are ordered (with special attention to potential side effects or drug interactions): Yes  Which situations necessitate calling provider and whom to contact: Yes       Teaching Concerns Addressed:     Proper use and care of  (medical equip, care aids, etc.): Yes  Nutritional needs and diet plan: Yes  Pain management techniques: Yes  Wound Care: Yes  How and/when to access community resources: Yes     Instructional Materials Used/Given: Preoperative teaching packet, antibacterial soap.  Susanne Purcell RN

## 2019-12-03 PROBLEM — R22.31 FINGER MASS, RIGHT: Status: ACTIVE | Noted: 2019-12-03

## 2019-12-09 ENCOUNTER — TELEPHONE (OUTPATIENT)
Dept: PODIATRY | Facility: CLINIC | Age: 64
End: 2019-12-09

## 2019-12-09 ENCOUNTER — TELEPHONE (OUTPATIENT)
Dept: FAMILY MEDICINE | Facility: CLINIC | Age: 64
End: 2019-12-09

## 2019-12-09 DIAGNOSIS — M79.675 PAIN OF TOE OF LEFT FOOT: ICD-10-CM

## 2019-12-09 DIAGNOSIS — M79.89 SOFT TISSUE MASS: Primary | ICD-10-CM

## 2019-12-09 NOTE — TELEPHONE ENCOUNTER
Osmin Reeves called and is returning a phone call she would like a call back .     Chrystal [place]  [facility]   Patient Rep

## 2019-12-09 NOTE — TELEPHONE ENCOUNTER
"Received message from prior auth dept:    \"The pre-authorization for the MR Toe Left w/o & w Contrast, CPT ode 62094 scheduled on 12/11/19 at 11:00 am was denied.     The reason Mateus gave that this request was denied was there was no plain x-ray done.\"    Please call pt to inform her.     I have placed an order for an x-ray.  She can call any clinic to get an x-ray appt to get this taken.  After result is obtained, she should f/u with her insurance about the MR.      "

## 2019-12-09 NOTE — TELEPHONE ENCOUNTER
Talked to patient and patient will contact clinic to get x-ray then follow up with insurance.     Lukas Powell MA on 12/9/2019 at 2:38 PM

## 2019-12-09 NOTE — TELEPHONE ENCOUNTER
Left message with patient to call me back to go over providers notes.     Lukas Powell MA on 12/9/2019 at 10:41 AM

## 2019-12-10 ENCOUNTER — OFFICE VISIT (OUTPATIENT)
Dept: FAMILY MEDICINE | Facility: CLINIC | Age: 64
End: 2019-12-10
Payer: COMMERCIAL

## 2019-12-10 VITALS
HEART RATE: 65 BPM | BODY MASS INDEX: 31.24 KG/M2 | DIASTOLIC BLOOD PRESSURE: 71 MMHG | SYSTOLIC BLOOD PRESSURE: 113 MMHG | HEIGHT: 64 IN | TEMPERATURE: 97.3 F | WEIGHT: 183 LBS | OXYGEN SATURATION: 96 % | RESPIRATION RATE: 16 BRPM

## 2019-12-10 DIAGNOSIS — Z23 NEED FOR IMMUNIZATION AGAINST TYPHOID: ICD-10-CM

## 2019-12-10 DIAGNOSIS — Z23 NEED FOR HEPATITIS A IMMUNIZATION: ICD-10-CM

## 2019-12-10 DIAGNOSIS — Z71.84 TRAVEL ADVICE ENCOUNTER: Primary | ICD-10-CM

## 2019-12-10 PROCEDURE — 90632 HEPA VACCINE ADULT IM: CPT | Performed by: FAMILY MEDICINE

## 2019-12-10 PROCEDURE — 99401 PREV MED CNSL INDIV APPRX 15: CPT | Mod: 25 | Performed by: FAMILY MEDICINE

## 2019-12-10 PROCEDURE — 90472 IMMUNIZATION ADMIN EACH ADD: CPT | Performed by: FAMILY MEDICINE

## 2019-12-10 PROCEDURE — 90471 IMMUNIZATION ADMIN: CPT | Performed by: FAMILY MEDICINE

## 2019-12-10 PROCEDURE — 90691 TYPHOID VACCINE IM: CPT | Performed by: FAMILY MEDICINE

## 2019-12-10 RX ORDER — AZITHROMYCIN 500 MG/1
TABLET, FILM COATED ORAL
Qty: 3 TABLET | Refills: 0 | Status: SHIPPED | OUTPATIENT
Start: 2019-12-10 | End: 2020-08-05

## 2019-12-10 ASSESSMENT — MIFFLIN-ST. JEOR: SCORE: 1370.08

## 2019-12-10 NOTE — PROGRESS NOTES
SUBJECTIVE: Valorie Hahn , a 64 year old  female, presents for counseling and information regarding upcoming travel to [place] . Special medical concerns include: none. She anticipates the following unusual exposures: none.    Itinerary:  [place]    Departure Date: 01/19/2020 Return date: 01/26/19    Reason for travel (i.e. Business, pleasure): pleasure     Visiting an urban or rural area?:  both    Accommodations (i.e. hotel, hostel, friends, family, etc):  Personal Homes     Women - First day of your last period: NA     IMMUNIZATION HISTORY  Have you received any vaccinations in the past 4 weeks?  No  Have you ever fainted from having your blood drawn or from an injection?  No  Have you ever had a fever reaction to vaccination?  No  Have you ever had any bad reaction or side effect from any vaccination?  No  Have you ever had hepatitis A or B vaccine?  No   Do you live (or work closely) with anyone who has AIDS, an AIDS-like condition, any other immune disorder or who is on chemotherapy for cancer?  No  Have you received any injection of immune globulin or any blood products during the past 12 months?  No    GENERAL MEDICAL HISTORY  Do you have a medical condition that warrants maintenance medication or physician follow-up?  No  Do you have a medical condition that is stable now, but that may recur while traveling?  No  Has your spleen been removed?  No  Have you had an acute illness or a fever in the past 48 hours?  No  Are you pregnant, or might you become pregnant on this trip?  Any chance of pregnancy?  No  Are you breastfeeding?  No  Do you have HIV, AIDS, an AIDS-like condition, any other immune disorder, leukemia or cancer?  No  Do you have a severe combined immunodeficiency disease?  No  Have you had your thymus gland removed or history of problems with your thymus, such as myasthenia gravis, DiGeorge syndrome, or thymoma?  No    Do you have severe thrombocytopenia (low platelet count) or a coagulation  disorder?  No  Have you ever had a convulsion, seizure, epilepsy, neurologic condition or brain infection?  No  Do you have any stomach conditions?  No  Do you have a G6PD deficiency?  No  Do you have severe renal or kidney impairment?  No  Do you have a history of psychiatric problems?  No  Do you have a problem with strange dreams and/or nightmares?  No  Do you have insomnia?  No  Do you have problems with vaginitis?  No  Do you have psoriasis?  No  Are you prone to motion sickness?  No  Have you ever had headaches, nausea, vomiting, or breathing problems from altitude exposure?  No      Past Medical History:   Diagnosis Date     Arthritis 2012    SI joint, osteoarthritis     Complication of anesthesia      Esophageal reflux      Genital herpes, unspecified      Kidney stone 09/2019     Mild intermittent asthma with exacerbation      PONV (postoperative nausea and vomiting)       Immunization History   Administered Date(s) Administered     Influenza Quad, Recombinant, p-free (RIV4) 02/05/2019, 11/12/2019     Influenza Vaccine IM > 6 months Valent IIV4 02/13/2018     TD (ADULT, 7+) 07/01/2005     TDAP Vaccine (Adacel) 04/27/2012     Td (Adult), Adsorbed 01/26/1998       Current Outpatient Medications   Medication Sig Dispense Refill     acyclovir (ZOVIRAX) 400 MG tablet Take 1 tablet (400 mg) by mouth 2 times daily For 3 days with each outbreak. 6 tablet 11     bromfenac (BROMDAY) 0.09 % ophthalmic solution Place 1 drop into the right eye daily 5 mL 3     meloxicam (MOBIC) 7.5 MG tablet Take 1 tablet (7.5 mg) by mouth daily 30 tablet 11     ondansetron (ZOFRAN-ODT) 4 MG ODT tab Take 1 tablet (4 mg) by mouth every 8 hours as needed for nausea (Patient not taking: Reported on 11/12/2019) 14 tablet 0     oxyCODONE (ROXICODONE) 5 MG tablet Take 1 tablet (5 mg) by mouth every 6 hours as needed for pain (Patient not taking: Reported on 11/12/2019) 8 tablet 0     prednisoLONE acetate (PRED FORTE) 1 % ophthalmic suspension  Place 1 drop into the right eye 4 times daily (Patient not taking: Reported on 11/12/2019) 1 Bottle 3     UNABLE TO FIND Place 1 drop into both eyes At Bedtime Serum tears       Allergies   Allergen Reactions     Penicillin G Hives        EXAM: deferred    Immunizations discussed include: Hepatitis A and Typhoid  Malaria prophylaxis recommended: n/a  Symptomatic treatment for traveler's diarrhea: azithromycin    ASSESSMENT/PLAN:  1. Travel advice encounter  - azithromycin (ZITHROMAX) 500 MG tablet; Take 1 tablet daily x 3 days for traveler's diarrhea  Dispense: 3 tablet; Refill: 0    2. Need for immunization against typhoid  - TYPHOID VACCINE, IM    3. Need for hepatitis A immunization  - HEPA VACCINE ADULT IM    I have reviewed general recommendations for safe travel   including: food/water precautions, insect avoidance, safe sex   practices given high prevalence of HIV and other STDs,   roadway safety. Educational materials and links to the [employer]  Traveler's health website have been provided.    Total time 15 minutes, greater than 50 percent in counseling   and coordination of care.       Yanna Urias MD   [facility]

## 2019-12-10 NOTE — NURSING NOTE
Prior to immunization administration, verified patients identity using patient s name and date of birth. Please see Immunization Activity for additional information.     Screening Questionnaire for Adult Immunization    Are you sick today?   No   Do you have allergies to medications, food, a vaccine component or latex?   Yes   Have you ever had a serious reaction after receiving a vaccination?   No   Do you have a long-term health problem with heart disease, lung disease, asthma, kidney disease, metabolic disease (e.g. diabetes), anemia, or other blood disorder?   Yes   Do you have cancer, leukemia, HIV/AIDS, or any other immune system problem?   No   In the past 3 months, have you taken medications that affect  your immune system, such as prednisone, other steroids, or anticancer drugs; drugs for the treatment of rheumatoid arthritis, Crohn s disease, or psoriasis; or have you had radiation treatments?   No   Have you had a seizure, or a brain or other nervous system problem?   No   During the past year, have you received a transfusion of blood or blood     products, or been given immune (gamma) globulin or antiviral drug?   No   For women: Are you pregnant or is there a chance you could become        pregnant during the next month?   No   Have you received any vaccinations in the past 4 weeks?   No     Immunization questionnaire answers were all negative.        Per orders of  , injection of  Hep A and Typhoid  given by Denise Madsen CMA. Patient instructed to remain in clinic for 15 minutes afterwards, and to report any adverse reaction to me immediately.       Screening performed by Dneise Madsen CMA on 12/10/2019 at 10:00 AM.

## 2019-12-11 ENCOUNTER — ANCILLARY PROCEDURE (OUTPATIENT)
Dept: GENERAL RADIOLOGY | Facility: CLINIC | Age: 64
End: 2019-12-11
Attending: PODIATRIST
Payer: COMMERCIAL

## 2019-12-11 DIAGNOSIS — R22.31 FINGER MASS, RIGHT: Primary | ICD-10-CM

## 2019-12-11 PROCEDURE — 73660 X-RAY EXAM OF TOE(S): CPT | Mod: LT

## 2019-12-11 RX ORDER — HYDROCODONE BITARTRATE AND ACETAMINOPHEN 5; 325 MG/1; MG/1
1 TABLET ORAL EVERY 6 HOURS PRN
Qty: 10 TABLET | Refills: 0 | Status: SHIPPED | OUTPATIENT
Start: 2019-12-12 | End: 2020-08-05

## 2019-12-11 NOTE — OP NOTE
PREOPERATIVE DIAGNOSIS: right ring finger mass      POSTOPERATIVE DIAGNOSIS:   right ring finger mass      PROCEDURE PERFORMED:   right ring finger mass excision      ATTENDING PHYSICIAN:  Karel Valle MD        ASSISTANT: None      ANESTHESIA:  Local consisting in 4 mL of a 1:1 mixture of 1% lidocaine and 0.5% bupivicaine      ESTIMATED BLOOD LOSS:  1 mL        TOURNIQUET USED:  Forearm tourniquet       IMPLANTS:  None.         SPECIMENS:  Mass sent for pathology.       FINDINGS:  Ganglion cyst arising from A2 pulley distal end      INDICATIONS: This patient is a  64 year old year old female who presented to clinic with a mass of the volar aspect of the right ring finger. The patient elected to proceed with surgical excision. Risks of surgery were discussed including but not limited to infection, bleeding, wound healing problems, damage to surrounding structures including nerves, blood vessels, and tendons, pain, scarring, and recurrence. The patient expressed understanding and wished to proceed.      PROCEDURE:  The patient was greeted in the preoperative holding area. The surgical site was marked. Local anesthetic was injected subcutaneously at the surgical site. The patient was brought into the operative suite and positioned with the operative extremity over a hand table.The operative extremity was then sterilely prepped and draped in the usual fashion. A formal time-out was performed verifying patient, side of surgery, and procedure to be performed.     A forearm tourniquet was placed. An oblique incision was made centered over the mass. Dissection was taken down to the mass. Neurovascular bundles were carefully protected. The mass was dissected out circumferentially. It was excised. Its base at the A2 pulley was cauterized with bipolar.  It was sent for pathology. The tourniquet was then removed and hemostasis was obtained. The wound was irrigated copiously. The skin was closed with 4-0 nylon horizontal  mattress sutures. A dressing was placed of a sterile 2x2, alicia, and coban wrapped in a non-constrictive fashion. The patient was then returned to the Postoperative Recovery Unit in good condition.  All sponge, needle and instrument counts were correct at the end of the case.       PLAN: The dressing should remain on for three days. The patient can then initiate daily dressing changes. Suture removal in 1-2 weeks.

## 2019-12-12 ENCOUNTER — HOSPITAL ENCOUNTER (OUTPATIENT)
Facility: AMBULATORY SURGERY CENTER | Age: 64
End: 2019-12-12
Attending: ORTHOPAEDIC SURGERY
Payer: COMMERCIAL

## 2019-12-12 VITALS
RESPIRATION RATE: 15 BRPM | HEART RATE: 56 BPM | OXYGEN SATURATION: 98 % | TEMPERATURE: 98.4 F | DIASTOLIC BLOOD PRESSURE: 86 MMHG | SYSTOLIC BLOOD PRESSURE: 136 MMHG

## 2019-12-12 DIAGNOSIS — R22.31 FINGER MASS, RIGHT: ICD-10-CM

## 2019-12-12 RX ORDER — LIDOCAINE HYDROCHLORIDE 10 MG/ML
5 INJECTION, SOLUTION EPIDURAL; INFILTRATION; INTRACAUDAL; PERINEURAL ONCE
Status: DISCONTINUED | OUTPATIENT
Start: 2019-12-12 | End: 2019-12-13 | Stop reason: HOSPADM

## 2019-12-12 RX ORDER — LIDOCAINE HYDROCHLORIDE 10 MG/ML
INJECTION, SOLUTION INFILTRATION; PERINEURAL PRN
Status: DISCONTINUED | OUTPATIENT
Start: 2019-12-12 | End: 2019-12-12 | Stop reason: HOSPADM

## 2019-12-12 RX ORDER — BUPIVACAINE HYDROCHLORIDE 5 MG/ML
INJECTION, SOLUTION PERINEURAL PRN
Status: DISCONTINUED | OUTPATIENT
Start: 2019-12-12 | End: 2019-12-12 | Stop reason: HOSPADM

## 2019-12-12 RX ORDER — BUPIVACAINE HYDROCHLORIDE 5 MG/ML
5 INJECTION, SOLUTION EPIDURAL; INTRACAUDAL ONCE
Status: DISCONTINUED | OUTPATIENT
Start: 2019-12-12 | End: 2019-12-13 | Stop reason: HOSPADM

## 2019-12-12 NOTE — DISCHARGE INSTRUCTIONS
Instructions for after your surgery    Remove your dressing after 3 days. At that point, can wash area with soap and water daily and then pat dry. Then re-wrap with gauze and coban. Be careful not to allow coban to get too tight. If the coban ever gets too tight (like its cutting off circulation), remove it and re-wrap it more loosely.      Keep your operative hand elevated as much as possible. This will help with pain and swelling.     Do not lift anything heavier than a coffee cup with your operative hand. You can use it for light activities like eating and brushing your teeth.    Depending on your surgery, you will have a follow-up appointment scheduled with Dr. Valle or an  or nurse. If you do not know when this appointment is, please call Dr. Valle's office to find out.     Call Dr. Valle's office if you experience any of the following:   Fevers, chills, increasing wound drainage, pain that is not controlled with the medications you have been prescribing, swelling that is not controlled with elevation, problems with your dressing, or any other questions or concerns.       [facility]  Home Care Following Your Procedure  Call a doctor if you have signs of infection (fever, growing tenderness at the surgery site, a large amount of drainage or bleeding, severe pain, foul-smelling drainage, redness, swelling).      Tylenol/Acetaminophen Consumption  To help encourage the safe use of acetaminophen, the makers of TYLENOL  have lowered the maximum daily dose for single-ingredient Extra Strength TYLENOL  (acetaminophen) products sold in the [place] from 8 pills per day (4,000 mg) to 6 pills per day (3,000 mg). The dosing interval has also changed from 2 pills every 4-6 hours to 2 pills every 6 hours.    If you feel your pain relief is insufficient, you may take Tylenol/Acetaminophen in addition to your narcotic pain medication.     Be careful not to exceed  3,000 mg of Tylenol/Acetaminophen in a 24 hour period from all sources.    If you are taking extra strength Tylenol/acetaminophen (500 mg), the maximum dose is 6 tablets in 24 hours.    If you are taking regular strength acetaminophen (325 mg), the maximum dose is 9 tablets in 24 hours.      Your doctor is:  Dr. Karel Valle, Orthopaedics: [phone]                                    Or dial [phone] and ask for the resident on call for:  Orthopaedics  For emergency care, call the:  [facility]: [phone] (TTY for hearing impaired: [phone])

## 2019-12-13 LAB — COPATH REPORT: NORMAL

## 2019-12-20 ENCOUNTER — TELEPHONE (OUTPATIENT)
Dept: OPHTHALMOLOGY | Facility: CLINIC | Age: 64
End: 2019-12-20

## 2019-12-20 ENCOUNTER — OFFICE VISIT (OUTPATIENT)
Dept: OPHTHALMOLOGY | Facility: CLINIC | Age: 64
End: 2019-12-20
Attending: STUDENT IN AN ORGANIZED HEALTH CARE EDUCATION/TRAINING PROGRAM
Payer: COMMERCIAL

## 2019-12-20 ENCOUNTER — MYC MEDICAL ADVICE (OUTPATIENT)
Dept: OPHTHALMOLOGY | Facility: CLINIC | Age: 64
End: 2019-12-20

## 2019-12-20 DIAGNOSIS — H35.352 CYSTOID MACULAR EDEMA, LEFT EYE: ICD-10-CM

## 2019-12-20 DIAGNOSIS — H35.351 CME (CYSTOID MACULAR EDEMA), RIGHT: Primary | ICD-10-CM

## 2019-12-20 DIAGNOSIS — H30.23 INTERMEDIATE UVEITIS OF BOTH EYES: ICD-10-CM

## 2019-12-20 PROCEDURE — 86780 TREPONEMA PALLIDUM: CPT | Performed by: OPHTHALMOLOGY

## 2019-12-20 PROCEDURE — 99000 SPECIMEN HANDLING OFFICE-LAB: CPT | Performed by: OPHTHALMOLOGY

## 2019-12-20 PROCEDURE — 92235 FLUORESCEIN ANGRPH MLTIFRAME: CPT | Mod: ZF | Performed by: STUDENT IN AN ORGANIZED HEALTH CARE EDUCATION/TRAINING PROGRAM

## 2019-12-20 PROCEDURE — 92134 CPTRZ OPH DX IMG PST SGM RTA: CPT | Mod: ZF | Performed by: STUDENT IN AN ORGANIZED HEALTH CARE EDUCATION/TRAINING PROGRAM

## 2019-12-20 PROCEDURE — 82164 ANGIOTENSIN I ENZYME TEST: CPT | Mod: 90 | Performed by: OPHTHALMOLOGY

## 2019-12-20 PROCEDURE — 86618 LYME DISEASE ANTIBODY: CPT | Performed by: OPHTHALMOLOGY

## 2019-12-20 PROCEDURE — G0463 HOSPITAL OUTPT CLINIC VISIT: HCPCS | Mod: ZF

## 2019-12-20 PROCEDURE — 36415 COLL VENOUS BLD VENIPUNCTURE: CPT | Performed by: OPHTHALMOLOGY

## 2019-12-20 PROCEDURE — 86481 TB AG RESPONSE T-CELL SUSP: CPT | Performed by: OPHTHALMOLOGY

## 2019-12-20 RX ORDER — PREDNISOLONE ACETATE 10 MG/ML
1 SUSPENSION/ DROPS OPHTHALMIC 4 TIMES DAILY
Qty: 1 BOTTLE | Refills: 0 | Status: SHIPPED | OUTPATIENT
Start: 2019-12-20 | End: 2020-01-10 | Stop reason: ALTCHOICE

## 2019-12-20 RX ORDER — DIFLUPREDNATE OPHTHALMIC 0.5 MG/ML
1 EMULSION OPHTHALMIC 2 TIMES DAILY
Qty: 1 BOTTLE | Refills: 3 | Status: SHIPPED | OUTPATIENT
Start: 2019-12-20 | End: 2020-01-10

## 2019-12-20 ASSESSMENT — CONF VISUAL FIELD
OD_NORMAL: 1
OS_NORMAL: 1
METHOD: COUNTING FINGERS

## 2019-12-20 ASSESSMENT — CUP TO DISC RATIO
OS_RATIO: 0.15
OD_RATIO: 0.25

## 2019-12-20 ASSESSMENT — SLIT LAMP EXAM - LIDS
COMMENTS: NORMAL
COMMENTS: NORMAL

## 2019-12-20 ASSESSMENT — REFRACTION_WEARINGRX
OD_SPHERE: -1.50
OS_AXIS: 150
OS_SPHERE: -0.75
OD_CYLINDER: +0.25
OD_ADD: +2.50
SPECS_TYPE: PAL
OS_CYLINDER: +0.75
OD_AXIS: 024
OS_ADD: +2.50

## 2019-12-20 ASSESSMENT — VISUAL ACUITY
OS_CC+: -2
OD_CC: 20/40
OD_CC+: -1+1
OS_CC: 20/20
METHOD: SNELLEN - LINEAR
CORRECTION_TYPE: GLASSES

## 2019-12-20 ASSESSMENT — TONOMETRY
IOP_METHOD: TONOPEN
OS_IOP_MMHG: 14
OD_IOP_MMHG: 12

## 2019-12-20 ASSESSMENT — EXTERNAL EXAM - RIGHT EYE: OD_EXAM: NORMAL

## 2019-12-20 NOTE — PROGRESS NOTES
HPI:  Valorie Hahn is a 63 year old female s/p CEIOL left eye 3/1/19 and right eye 3/8/19 who developed cystoid macular in each eye after cataract surgery. The left eye responded well to drops for a few weeks only. The CME in the right eye has needed longer course of eye drops.     Interval Hx:   Since last visit, Ms. Gould notes her vision remains blurred in the right eye. Left eye is doing well. No pain, redness, discharge. No flashes/floaters. Has been on bromday qday and PF QID right eye for the past 4 weeks      POH: S/p CE/IOL both eyes 3/2019, CME OS  PMH: Intermittent asthma  FH: Brother with strabismus and amblyopia s/p strabismus surgery as a child.    Current Gtts (right eye)  - PF: QID  - Bromday: qday     Previous Lab Work-up (2012)  - HLA-B27, ESR, RF, MICA, Anti-citrullinated, Hep C: negative     Imaging today (12/20/19):  Mac OCT:   - RE: tr erm; mildly improving IRF with stable to improved sub-foveal SRF;  --> 478  - LE: normal macula; PHF attached; stable    Optos FA (transit right eye) 12/20/19   - RE: Slight delay in transit; petalloid leakage at macula; disc leakage, diffuse small vessel leakage   - LE: mild disc leakage and faint foveal leakage. Temporal and nasal small vessel leakage.      ASSESSMENT & PLAN:  #1 Cystoid Macular Edema, right eye  -Presented 3 months after CE+IOLNo medical historical or ocular clues (no vitreous to wound), no RVO.  Presumed [place] [facility] CME right eye.  - Nearly resolved with PF/Ketorolac QID 9/2019  - Ketorolac d/c'd 2/2 epitheliopathy, swithced to Bromday qday 11/2019  - OCT with improved but still present CME  - As below, Optos FA with intermediate uveitis  - Given lack of improvement on prednisolone QID will switch to  Durezol BID right eye and monitor IOP closely. Continue bromday qday     #2 Intermediate uveitis each eye   - No clinical vitreous inflammation   - FA today (12/20/19) show disc, foveal and peripheral vascular leakage right eye >  left eye   - Previous work-up in 2012 wnl   - Will get Labs: TB/Syphilis/ACE/Lyme, continue topical drops for now. If labs negative and CME not improved, might consider STK or prednisone     #3 Cystoid Macular edema left eye  - Resolved after 2 months of Ketorolac and Pred Forte, no recurrence by OCT, but as above, faint disc and foveal leakage as well as peripheral leakage by angiography.  - Continue to monitor without drops as no CME    #4 Pseudophakia each eye  - Monitor    Plan:  - Labs today for intermediate uveitis (Quantiferon, Syphilis, ACE, Lyme)  And CME   - Switch Pred Forte QID to durezol BID right eye; continue Bromday qday right eye   - RTC 3 weeks, DFE, Mac OCT each eye     Patient disposition: Return in about 3 weeks (around 1/10/2020) for Follow Up, DFE, Mac OCT OU .     Jose Juan Wallis MD  Ophthalmology Resident  PGY-3     Attending Physician Attestation:  Complete documentation of historical and exam elements from today's encounter can be found in the full encounter summary report (not reduplicated in this progress note). I reviewed the chief complaint(s) and history of present illness, and  confirmed and edited as necessary the review of systems, past medical/surgical history, family history, social history, and examination findings as documented by others and the treating Resident Physician.    I examined the patient myself, discussed the findings and modified the assessment and plan with the Treating Resident Physician. I agree with the note as detailed above.   Loco Hughes M.D.  Uveitis and Medical Retina  December 20, 2019

## 2019-12-20 NOTE — NURSING NOTE
Chief Complaints and History of Present Illnesses   Patient presents with     Cystoid Macular Edema Follow Up     Chief Complaint(s) and History of Present Illness(es)     Cystoid Macular Edema Follow Up     Laterality: right eye    Quality: blurred vision    Severity: mild    Frequency: constantly    Course: stable    Associated symptoms: Negative for flashes, floaters, eye pain, discharge, dryness and tearing    Treatments tried: eye drops    Response to treatment: mild improvement    Pain scale: 0/10              Comments     Pt denies any significant vision changes in either eye since last visit.  Denies any flashes, floaters, pain, pressure, irritation, discharge, and tearing.  Notes she finished the Prednisolone and Bromday yesterday.    Zeny Montgomery OT 8:54 AM December 20, 2019

## 2019-12-20 NOTE — TELEPHONE ENCOUNTER
Note to PA team to initiate prior authorization for Durezol    Pt to use prednisolone 4/day in right eye until approved    Pt verbally demonstrated understanding  Jong Aguiar RN 12:21 PM 12/20/19          [facility] Call Center    Phone Message    May a detailed message be left on voicemail: yes    Reason for Call: Medication Question or concern regarding medication   Prescription Clarification  Name of Medication: difluprednate (DUREZOL) 0.05 % ophthalmic emulsion  Prescribing Provider: Dr. Wallis   Pharmacy: [facility][id number] [address] AT [address]   What on the order needs clarification? Pt's insurance will not cover this medication, so she is calling to see if there is an alternative. Please give her a call back.          Action Taken: Message routed to:  [facility] Clinic

## 2019-12-21 LAB
ACE SERPL-CCNC: 32 U/L (ref 9–67)
T PALLIDUM AB SER QL: NONREACTIVE

## 2019-12-22 PROBLEM — H30.23 INTERMEDIATE UVEITIS OF BOTH EYES: Status: ACTIVE | Noted: 2019-12-22

## 2019-12-22 ASSESSMENT — EXTERNAL EXAM - LEFT EYE: OS_EXAM: NORMAL

## 2019-12-23 LAB
B BURGDOR IGG+IGM SER QL: 0.07 (ref 0–0.89)
GAMMA INTERFERON BACKGROUND BLD IA-ACNC: 0.03 IU/ML
M TB IFN-G BLD-IMP: NEGATIVE
M TB IFN-G CD4+ BCKGRND COR BLD-ACNC: 8.88 IU/ML
MITOGEN IGNF BCKGRD COR BLD-ACNC: 0 IU/ML
MITOGEN IGNF BCKGRD COR BLD-ACNC: 0 IU/ML

## 2019-12-26 ENCOUNTER — ALLIED HEALTH/NURSE VISIT (OUTPATIENT)
Dept: ORTHOPEDICS | Facility: CLINIC | Age: 64
End: 2019-12-26
Payer: COMMERCIAL

## 2019-12-26 DIAGNOSIS — R22.31 FINGER MASS, RIGHT: Primary | ICD-10-CM

## 2019-12-26 NOTE — PROGRESS NOTES
Reason for visit:    Valorie Hahn came in to the clinic for a two week post op check.    Her surgery was done 12/12/19 by Dr Valle.  She had right ring finger mass excision.     Assessment:    Valorie came into the clinic with a bandaid covering her incision site.     The Surgical wounds were exposed and found to be well-healed; so the sutures were removed.    Plan:     She was placed in another band aid.  She was told to keep the area clean and dry, not to soak or submerge her hand in water, not to apply any lotions, ointments, or creams over the surgical site, and not to lift anything greater than 5 lbs.      She has an appointment to see Dr. Valle at that time Dr. Valle will determine further restrictions.    She has our phone number and will call with questions or problems.        Answers for HPI/ROS submitted by the patient on 12/26/2019   General Symptoms: No  Skin Symptoms: No  HENT Symptoms: No  EYE SYMPTOMS: No  HEART SYMPTOMS: No  LUNG SYMPTOMS: No  INTESTINAL SYMPTOMS: No  URINARY SYMPTOMS: No  GYNECOLOGIC SYMPTOMS: No  BREAST SYMPTOMS: No  SKELETAL SYMPTOMS: No  BLOOD SYMPTOMS: No  NERVOUS SYSTEM SYMPTOMS: No  MENTAL HEALTH SYMPTOMS: No

## 2020-01-10 ENCOUNTER — OFFICE VISIT (OUTPATIENT)
Dept: OPHTHALMOLOGY | Facility: CLINIC | Age: 65
End: 2020-01-10
Attending: OPHTHALMOLOGY
Payer: COMMERCIAL

## 2020-01-10 DIAGNOSIS — Z96.1 PSEUDOPHAKIA OF BOTH EYES: ICD-10-CM

## 2020-01-10 DIAGNOSIS — H35.351 CME (CYSTOID MACULAR EDEMA), RIGHT: Primary | ICD-10-CM

## 2020-01-10 DIAGNOSIS — H30.23 INTERMEDIATE UVEITIS OF BOTH EYES: ICD-10-CM

## 2020-01-10 DIAGNOSIS — H35.352 CYSTOID MACULAR EDEMA, LEFT EYE: ICD-10-CM

## 2020-01-10 PROCEDURE — G0463 HOSPITAL OUTPT CLINIC VISIT: HCPCS | Mod: ZF

## 2020-01-10 PROCEDURE — 92134 CPTRZ OPH DX IMG PST SGM RTA: CPT | Mod: ZF | Performed by: STUDENT IN AN ORGANIZED HEALTH CARE EDUCATION/TRAINING PROGRAM

## 2020-01-10 RX ORDER — DIFLUPREDNATE OPHTHALMIC 0.5 MG/ML
1 EMULSION OPHTHALMIC 2 TIMES DAILY
Qty: 1 BOTTLE | Refills: 3 | Status: SHIPPED | OUTPATIENT
Start: 2020-01-10 | End: 2020-09-23

## 2020-01-10 RX ORDER — BROMFENAC 1.03 MG/ML
1 SOLUTION/ DROPS OPHTHALMIC DAILY
Qty: 5 ML | Refills: 3 | Status: SHIPPED | OUTPATIENT
Start: 2020-01-10 | End: 2020-09-23

## 2020-01-10 ASSESSMENT — TONOMETRY
IOP_METHOD: TONOPEN
OS_IOP_MMHG: 12
OD_IOP_MMHG: 15

## 2020-01-10 ASSESSMENT — SLIT LAMP EXAM - LIDS
COMMENTS: NORMAL
COMMENTS: NORMAL

## 2020-01-10 ASSESSMENT — REFRACTION_WEARINGRX
OS_AXIS: 150
OD_CYLINDER: +0.25
OS_ADD: +2.50
OD_SPHERE: -1.50
OS_CYLINDER: +0.75
SPECS_TYPE: PAL
OS_SPHERE: -0.75
OD_AXIS: 024
OD_ADD: +2.50

## 2020-01-10 ASSESSMENT — CUP TO DISC RATIO
OS_RATIO: 0.15
OD_RATIO: 0.25

## 2020-01-10 ASSESSMENT — VISUAL ACUITY
METHOD: SNELLEN - LINEAR
OD_PH_CC: 20/25
CORRECTION_TYPE: GLASSES
OS_CC: 20/20
OD_CC: 20/30

## 2020-01-10 ASSESSMENT — CONF VISUAL FIELD
OD_NORMAL: 1
METHOD: COUNTING FINGERS
OS_NORMAL: 1

## 2020-01-10 ASSESSMENT — EXTERNAL EXAM - LEFT EYE: OS_EXAM: NORMAL

## 2020-01-10 ASSESSMENT — EXTERNAL EXAM - RIGHT EYE: OD_EXAM: NORMAL

## 2020-01-10 NOTE — PROGRESS NOTES
HPI:  Valorie Hahn is a 63 year old female s/p CEIOL left eye 3/1/19 and right eye 3/8/19 who developed cystoid macular in each eye after cataract surgery. The left eye responded well to drops for a few weeks only. The CME in the right eye has needed longer course of eye drops.     Interval Hx:   Notes vision is still a little blurry in the RIGHT eye but much better than last visit. Since 12/20/2019 has been doing durezol BID and Bromday qday right eye. Denies flashes or floaters.     POH: S/p CE/IOL both eyes 3/2019, CME OS  PMH: Intermittent asthma  FH: Brother with strabismus and amblyopia s/p strabismus surgery as a child.    Current Gtts (right eye)  - Durezol: BID  - Bromday: qday     Lab Work-up   - Quant Gold, Lyme, Treponema, ACE negative (12/2019)  - HLA-B27, ESR, RF, MICA, Anti-citrullinated, Hep C: negative (2012)    Imaging today (1/10/2020):  Mac OCT:   - RE: tr erm; greatly improved IRF with nearly resolved SRF; mildly improving IRF with stable to improved sub-foveal SRF;  --> 478 --> 381  - LE: normal macula; PHF attached; stable    Optos FA (transit right eye) 12/20/19   - RE: Slight delay in transit; petalloid leakage at macula; disc leakage, diffuse small vessel leakage   - LE: mild disc leakage and faint foveal leakage. Temporal and nasal small vessel leakage.      ASSESSMENT & PLAN:  #1 Cystoid Macular Edema, right eye  -Presented 3 months after CE+IOLNo medical historical or ocular clues (no vitreous to wound), no RVO.  Presumed [place] Denise CME right eye.  - Nearly resolved with PF/Ketorolac QID 9/2019  - Ketorolac d/c'd 2/2 epitheliopathy, swithced to Bromday qday 11/2019  - Unable to improved with PF, so switched to durezol 12/20/2019  - Much improved today on durezol, OCT w/ nearly resolved IRF/SRF  - Continue bromday qday, very slow taper of durezol (BID x2 weeks, qday x2 week)    #2 Intermediate uveitis each eye   - No clinical vitreous inflammation   - FA (12/20/19) with  disc, foveal and peripheral vascular leakage right eye > left eye   - Previous work-up in 2012 wnl. Lab work-up negative TB/Syphilis/ACE/Lyme  - Continue topical drops for now. If CME worsens, might consider STK or prednisone which would treat CME as well as retinal vascular leakage     #3 Cystoid Macular edema left eye  - Resolved after 2 months of Ketorolac and Pred Forte, no recurrence by OCT, but as above, faint disc and foveal leakage as well as peripheral leakage by angiography.  - Continue to monitor without drops as no CME    #4 Pseudophakia each eye  - Monitor  - PCO, will need yag cap when swelling and inflammation resolves     Plan:  - Continue bromday right eye   - Slow taper of Durezol right eye: BID for 2 weeks, qday for 2 weeks, then follow-up    Patient disposition: Return in about 4 weeks (around 2/7/2020) for Follow Up, DFE, Mac OCT .     Jose Juan Wallis MD  Ophthalmology Resident  PGY-3     Attending Physician Attestation:  Complete documentation of historical and exam elements from today's encounter can be found in the full encounter summary report (not reduplicated in this progress note). I reviewed the chief complaint(s) and history of present illness, and  confirmed and edited as necessary the review of systems, past medical/surgical history, family history, social history, and examination findings as documented by others and the treating Resident Physician.    I examined the patient myself, discussed the findings and modified the assessment and plan with the Treating Resident Physician. I agree with the note as detailed above.   Loco Hughes M.D.  Uveitis and Medical Retina  January 10, 2020

## 2020-01-10 NOTE — NURSING NOTE
Chief Complaint(s) and History of Present Illness(es)     Cystoid Macular Edema Follow Up     In both eyes.  Associated symptoms include Negative for dryness, eye pain, redness, tearing, flashes and floaters.              Comments     3 week f/u for CME, BE. Pt notes vision is getting better x the last 3 weeks.     Ocular meds: Durezol BID RE, Bromday every day RE.     Adilia Foster, COMT 8:55 AM January 10, 2020

## 2020-01-10 NOTE — PATIENT INSTRUCTIONS
For the RIGHT eye:  - Durezol (PINK CAP): 2 times daily for 2 weeks, then 1 time daily until we see you again  - Bromday: 1 time daily    Return in about 4 weeks

## 2020-02-05 ENCOUNTER — OFFICE VISIT (OUTPATIENT)
Dept: OPHTHALMOLOGY | Facility: CLINIC | Age: 65
End: 2020-02-05
Attending: STUDENT IN AN ORGANIZED HEALTH CARE EDUCATION/TRAINING PROGRAM
Payer: COMMERCIAL

## 2020-02-05 DIAGNOSIS — H35.372 EPIRETINAL MEMBRANE, LEFT EYE: ICD-10-CM

## 2020-02-05 DIAGNOSIS — H35.351 CME (CYSTOID MACULAR EDEMA), RIGHT: Primary | ICD-10-CM

## 2020-02-05 DIAGNOSIS — H35.351 CME (CYSTOID MACULAR EDEMA), RIGHT: ICD-10-CM

## 2020-02-05 PROCEDURE — 92134 CPTRZ OPH DX IMG PST SGM RTA: CPT | Mod: ZF | Performed by: STUDENT IN AN ORGANIZED HEALTH CARE EDUCATION/TRAINING PROGRAM

## 2020-02-05 PROCEDURE — G0463 HOSPITAL OUTPT CLINIC VISIT: HCPCS | Mod: ZF

## 2020-02-05 ASSESSMENT — VISUAL ACUITY
OD_CC: 20/30
OS_CC+: -1
OS_CC: 20/20
CORRECTION_TYPE: GLASSES
OD_CC+: -1
METHOD: SNELLEN - LINEAR

## 2020-02-05 ASSESSMENT — EXTERNAL EXAM - LEFT EYE: OS_EXAM: NORMAL

## 2020-02-05 ASSESSMENT — TONOMETRY
IOP_METHOD: TONOPEN
OS_IOP_MMHG: 14
OD_IOP_MMHG: 17

## 2020-02-05 ASSESSMENT — CONF VISUAL FIELD
OS_NORMAL: 1
METHOD: COUNTING FINGERS
OD_NORMAL: 1

## 2020-02-05 ASSESSMENT — CUP TO DISC RATIO
OS_RATIO: 0.15
OD_RATIO: 0.25

## 2020-02-05 ASSESSMENT — EXTERNAL EXAM - RIGHT EYE: OD_EXAM: NORMAL

## 2020-02-05 ASSESSMENT — SLIT LAMP EXAM - LIDS
COMMENTS: NORMAL
COMMENTS: NORMAL

## 2020-02-05 NOTE — NURSING NOTE
Chief Complaints and History of Present Illnesses   Patient presents with     Follow Up     Chief Complaint(s) and History of Present Illness(es)     Follow Up     Laterality: right eye    Quality: blurred vision    Associated symptoms: Negative for floaters and flashes    Treatments tried: eye drops    Response to treatment: moderate improvement    Pain scale: 0/10              Comments     The patient presents for follow up for Cystoid Macular Edema, right eye.  The patient states since starting drops her vision has improved.    She still has a little blurred especially review.  Daphne Brennan, COA, COA 9:42 AM 02/05/2020

## 2020-02-05 NOTE — PROGRESS NOTES
HPI:  Valorie Hahn is a 63 year old female s/p CEIOL left eye 3/1/19 and right eye 3/8/19 who developed cystoid macular in each eye after cataract surgery. The left eye responded well to drops for a few weeks only. The CME in the right eye has needed longer course of eye drops.     Interval Hx:   Last visit was 4 weeks has been doing really well. She had a couple of days of mild worsening of symptoms but realized she had accidentally stopped the wrong drop. She is now back on durezol BID and bromday qday.     POH: S/p CE/IOL both eyes 3/2019, CME OS  PMH: Intermittent asthma  FH: Brother with strabismus and amblyopia s/p strabismus surgery as a child.    Current Gtts (right eye)  - Durezol: BID  - Bromday: qday     Lab Work-up   - Quant Gold, Lyme, Treponema, ACE negative (12/2019)  - HLA-B27, ESR, RF, MICA, Anti-citrullinated, Hep C: negative (2012)    Optos FA (transit right eye) 12/20/19   - RE: Slight delay in transit; petalloid leakage at macula; disc leakage, diffuse small vessel leakage   - LE: mild disc leakage and faint foveal leakage. Temporal and nasal small vessel leakage.      ASSESSMENT & PLAN:  #0 Epiretinal membrane right eye   - Likely mixed mechanism but related to high Durezol use  - Could be causing a majority of the cystic changes in retina  - Will re-evaluate after off Durezol for 1 month; consider retina evaluation     #1 Cystoid Macular Edema, right eye  -Presented 3 months after CE+IOLNo medical historical or ocular clues (no vitreous to wound), no RVO.  Presumed [place] [facility] CME right eye.  - Nearly resolved with PF/Ketorolac QID 9/2019  - Ketorolac d/c'd 2/2 epitheliopathy, swithced to Bromday qday 11/2019  - Unable to improved with PF, so switched to durezol 12/20/2019  - OCT w/ nearly resolved IRF/SRF  - Taper bromday, very slow taper of durezol (BID x2 weeks, qday x2 week, then STOP)  - Return in 6 weeks     #2 Intermediate uveitis each eye   - No clinical vitreous inflammation    - FA (12/20/19) with disc, foveal and peripheral vascular leakage right eye > left eye   - Previous work-up in 2012 wnl. Lab work-up negative TB/Syphilis/ACE/Lyme  - Continue topical drops for now. If CME worsens, might consider STK or prednisone which would treat CME as well as retinal vascular leakage     #3 Cystoid Macular edema left eye  - Resolved after 2 months of Ketorolac and Pred Forte, no recurrence by OCT, but as above, faint disc and foveal leakage as well as peripheral leakage by angiography.  - Continue to monitor without drops as no CME    #4 Pseudophakia each eye  - Monitor  - PCO, will need yag cap when swelling and inflammation resolves     Plan:  - Complete Bromday   - Slow taper of Durezol right eye: BID for 2 weeks, qday for 2 weeks, then follow-up    Patient disposition: Return in about 8 weeks (around 4/1/2020) for Follow Up OCT Macula .     Jose Juan Wallis MD  Ophthalmology Resident  PGY-3     Teaching statement:  Complete documentation of historical and exam elements from today's encounter can be found in the full encounter summary report (not reduplicated in this progress note). I personally obtained the chief complaint(s) and history of present illness.  I confirmed and edited as necessary the review of systems, past medical/surgical history, family history, social history, and examination findings as documented by others; and I examined the patient myself. I personally reviewed the relevant tests, images, and reports as documented above.     I formulated and edited as necessary the assessment and plan and discussed the findings and management plan with the patient and family.    Danna Garcia MD  Comprehensive Ophthalmology & Ocular Pathology  Department of Ophthalmology and Visual Neurosciences  [email or web address]  Pager [phone]

## 2020-02-05 NOTE — PATIENT INSTRUCTIONS
Drops for RIGHT eye:   - Use Bromday 1 time daily until its empty  - Durezol: 2 times daily for 2 weeks (after Bromday empty), then 1 time daily for 2 weeks, then STOP    Return in 8 weeks, sooner if needed

## 2020-04-23 ENCOUNTER — OFFICE VISIT (OUTPATIENT)
Dept: OPHTHALMOLOGY | Facility: CLINIC | Age: 65
End: 2020-04-23
Attending: OPHTHALMOLOGY
Payer: COMMERCIAL

## 2020-04-23 DIAGNOSIS — H35.372 EPIRETINAL MEMBRANE, LEFT EYE: ICD-10-CM

## 2020-04-23 DIAGNOSIS — H30.23 INTERMEDIATE UVEITIS OF BOTH EYES: ICD-10-CM

## 2020-04-23 DIAGNOSIS — H26.491 POSTERIOR CAPSULAR OPACIFICATION, RIGHT: Primary | ICD-10-CM

## 2020-04-23 DIAGNOSIS — Z96.1 PSEUDOPHAKIA, BOTH EYES: ICD-10-CM

## 2020-04-23 DIAGNOSIS — H35.353 CYSTOID MACULAR EDEMA, BOTH EYES: ICD-10-CM

## 2020-04-23 PROCEDURE — G0463 HOSPITAL OUTPT CLINIC VISIT: HCPCS | Mod: 25

## 2020-04-23 PROCEDURE — 66821 AFTER CATARACT LASER SURGERY: CPT | Mod: RT,ZF | Performed by: OPHTHALMOLOGY

## 2020-04-23 PROCEDURE — 92134 CPTRZ OPH DX IMG PST SGM RTA: CPT | Mod: ZF | Performed by: OPHTHALMOLOGY

## 2020-04-23 ASSESSMENT — SLIT LAMP EXAM - LIDS
COMMENTS: NORMAL
COMMENTS: NORMAL

## 2020-04-23 ASSESSMENT — REFRACTION_WEARINGRX
OD_SPHERE: -1.50
OS_SPHERE: -0.75
OD_CYLINDER: +0.25
OS_ADD: +2.50
OD_ADD: +2.50
SPECS_TYPE: PAL
OS_CYLINDER: +0.75
OD_AXIS: 024
OS_AXIS: 150

## 2020-04-23 ASSESSMENT — CONF VISUAL FIELD
OS_NORMAL: 1
OD_NORMAL: 1

## 2020-04-23 ASSESSMENT — TONOMETRY
IOP_METHOD: TONOPEN
OS_IOP_MMHG: 10
OD_IOP_MMHG: 10

## 2020-04-23 ASSESSMENT — VISUAL ACUITY
OD_CC: 20/30
OS_CC: 20/20
CORRECTION_TYPE: GLASSES
METHOD: SNELLEN - LINEAR
OD_CC+: -1

## 2020-04-23 ASSESSMENT — CUP TO DISC RATIO
OS_RATIO: 0.15
OD_RATIO: 0.25

## 2020-04-23 ASSESSMENT — EXTERNAL EXAM - LEFT EYE: OS_EXAM: NORMAL

## 2020-04-23 ASSESSMENT — EXTERNAL EXAM - RIGHT EYE: OD_EXAM: NORMAL

## 2020-04-23 NOTE — PROGRESS NOTES
"HPI:  Valorie Hahn is a 63 year old female s/p CEIOL left eye 3/1/19 and right eye 3/8/19 who developed cystoid macular in each eye after cataract surgery. The left eye responded well to drops for a few weeks only. The CME in the right eye needed longer course of eye drops.     Interval Hx:   Last visit was about 6 weeks ago. She tapered and discontinued the durezol and bromday as instructed, but she has had a persistent \"cloudiness\" to the vision. There is some waviness, but this has gotten better as the swelling improved. Her main functional difficulty is with reading. No pain redness, discharge. No new flashes/floaters.     POH: S/p CE/IOL both eyes 3/2019, CME OS  PMH: Intermittent asthma  FH: Brother with strabismus and amblyopia s/p strabismus surgery as a child.    Current Gtts (right eye)  - Durezol: BID  - Bromday: qday     Lab Work-up   - Quant Gold, Lyme, Treponema, ACE negative (12/2019)  - HLA-B27, ESR, RF, MICA, Anti-citrullinated, Hep C: negative (2012)    Optos FA (transit right eye) 12/20/19   - RE: Slight delay in transit; petalloid leakage at macula; disc leakage, diffuse small vessel leakage   - LE: mild disc leakage and faint foveal leakage. Temporal and nasal small vessel leakage.      ASSESSMENT & PLAN:  (H26.491) Posterior capsular opacification, right  (primary encounter diagnosis)  (Z96.1) Pseudophakia, both eyes  Comment: Increased PCO right eye likely starting to affect vision as her CME appears small and stable with stable ERM right eye after tapering topical Bromday and Durezol.  Plan: Discussed r/b/a of YAG capsulotomy right eye, and she would like to proceed. Please see Imaging/Proc tab for procedure details.     (H35.353) Cystoid macular edema, both eyes  Comment:  Presented 3 months after CE+IOL. No medical historical or ocular findings to explain relatively late presentation for post-operative CME. No retained lens fragments, no vitreous in AC, no RVO. Presumed [place] [facility]ise. " The left eye responded well to topical drops with resolution of CME within a few weeks. The right eye has required a longer course of topical Bromday and Durezol. She has now tapered the drops with few tiny IR cysts which may be related more to ERM than inflammation. Today's macula OCT quality is more grainy, likely due to posterior capsular opacity.   Plan: Continue to observe off drops for now.     (H35.372) Epiretinal membrane, left eye  Comment: If symptoms persist and CME remains very tiny/stable off drops, then ERM is most likely cause of her symptoms.  Plan: Consider retina referral if YAG cap does not improve her symptoms.    (H20.13) Intermediate uveitis of both eyes  Comment: No clinical vitreous inflammation. FA (12/20/19) with disc, foveal and peripheral vascular leakage right eye > left eye. Previous work-up in 2012 wnl. Lab work-up negative TB/Syphilis/ACE/Lyme  Plan: Monitor.   If CME worsens, might consider STK or prednisone which would treat CME as well as retinal vascular leakage     Patient disposition: Return in about 2 months (around 6/23/2020) for retina clinic evaluation if symptoms persist.     Teaching statement:  Complete documentation of historical and exam elements from today's encounter can be found in the full encounter summary report (not reduplicated in this progress note). I personally obtained the chief complaint(s) and history of present illness.  I confirmed and edited as necessary the review of systems, past medical/surgical history, family history, social history, and examination findings as documented by others; and I examined the patient myself. I personally reviewed the relevant tests, images, and reports as documented above.     I formulated and edited as necessary the assessment and plan and discussed the findings and management plan with the patient and family.    Danna Garcia MD  Comprehensive Ophthalmology & Ocular Pathology  Department of Ophthalmology and Visual  [email or web address]  Pager [phone]

## 2020-04-23 NOTE — NURSING NOTE
Chief Complaints and History of Present Illnesses   Patient presents with     Blurred Vision Right Eye     Chief Complaint(s) and History of Present Illness(es)     Blurred Vision Right Eye     Laterality: right eye    Onset: 1 year ago    Quality: blurred vision    Severity: moderate    Frequency: constantly    Timing: throughout the day, at random times and when reading    Context: distance vision, mid-range vision and near vision    Course: stable    Associated symptoms: flashes, photophobia, dryness and tearing (LE during cold weather).  Negative for pain with eye movement, floaters and eye pain    Treatments tried: no treatments    Pain scale: 0/10              Comments     Blurred vision RE x 1 year  Very dry at times  Not taking any art tear or medicated drops   Zarina VIERA 9:23 AM April 23, 2020

## 2020-05-07 ENCOUNTER — VIRTUAL VISIT (OUTPATIENT)
Dept: FAMILY MEDICINE | Facility: OTHER | Age: 65
End: 2020-05-07

## 2020-05-07 NOTE — PROGRESS NOTES
"Date: 2020 12:27:53  Clinician: Benji Ogden  Clinician NPI: [id number]  Patient: connie lynn  Patient : 1955  Patient Address: [address] [place] [id number]  Patient Phone: [phone]  Visit Protocol: URI  Patient Summary:  connie is a 64 year old ( : 1955 ) female who initiated a Visit for COVID-19 (Coronavirus) evaluation and screening. When asked the question \"Please sign me up to receive news, health information and promotions. \", connie responded \"No\".    connie states her symptoms started 1-2 days ago.   Her symptoms consist of myalgia and a cough.   Symptom details   Cough: connie coughs a few times an hour and her cough is more bothersome at night. Phlegm does not come into her throat when she coughs. She does not believe her cough is caused by post-nasal drip.    connie denies having fever, rhinitis, facial pain or pressure, sore throat, nasal congestion, vomiting, nausea, teeth pain, ageusia, anosmia, diarrhea, ear pain, headache, malaise, wheezing, and chills. She also denies taking antibiotic medication for the symptoms and having recent facial or sinus surgery in the past 60 days. She is not experiencing dyspnea.   Precipitating events  She has not recently been exposed to someone with influenza. connie has not been in close contact with any high risk individuals.   Pertinent COVID-19 (Coronavirus) information  connie does not work or volunteer as healthcare worker or a  and does not work or volunteer in a healthcare facility.   She does not live with a healthcare worker.   connie has not had a close contact with a laboratory-confirmed COVID-19 patient within 14 days of symptom onset. She also has not had a close contact with a suspected COVID-19 patient within 14 days of symptom onset.   Pertinent medical history  connie does not get yeast infections when she takes antibiotics.   connie does not need a return " to work/school note.   Weight: 175 lbs   connie does not smoke or use smokeless tobacco.   Additional information as reported by the patient (free text): Fever of 100.5 yesterday, fine today. Had chills and muscle aches. Today better. Dry cough, intermittently. Should I be concerned.?   Weight: 175 lbs    MEDICATIONS: No current medications, ALLERGIES: Penicillins  Clinician Response:  Dear connie,   Dear connie  Your symptoms show that you may have coronavirus (COVID-19). This illness can cause fever, cough and trouble breathing. Many people get a mild case and get better on their own. Some people can get very sick.  Will I be tested for COVID-19?  Because we have limited testing supplies we are not testing everyone if they are low risk. We are testing if:   You are very ill. For example, you're on chemotherapy, dialysis or home hospice care. (Contact your specialty clinic or program.)   You live in a nursing home or other long-term care facility. (Talk to your nurse manager or medical director.)   You're a health care worker. ([facility] employees Contact our employee health office for testing.)   We are performing limited curbside testing for healthcare/first responders and people with medical problems that put them at increased risk. It does not appear by the [facility] information you submitted that you meet any of these criteria. If there are medical problems that we did not know about, please repeat an [facility] visit and let us know what medical conditions you have.   How can I protect others?  Without a test, we can't know for sure that you have COVID-19. For safety, it's very important to follow these rules.  First, stay home and away from others (self-isolate) until:   You've had no fever---and no medicine that reduces fever---for 3 full days (72 hours). And...    Your other symptoms have gotten better. For example, your cough or breathing has improved. And...   At least 10 days have passed since  your symptoms started.   During this time:   Don't go to work, school or anywhere else.    Stay away from others in your home. No hugging, kissing or shaking hands.   Don't let anyone visit.   Cover your mouth and nose with a mask, tissue or wash cloth to avoid spreading germs.   Wash your hands and face often. Use soap and water.   How can I take care of myself?  1.Take Tylenol (acetaminophen) for fever or pain. If you have liver or kidney problems, ask your family doctor if it's okay to take Tylenol.   Adults can take either:    650 mg (two 325 mg pills) every 4 to 6 hours, or...   1,000 mg (two 500 mg pills) every 8 hours as needed.    Note: Don't take more than 3,000 mg in one day.  For children, check the Tylenol bottle for the right dose. The dose is based on the child's age or weight.   2.If you have other health problems (like cancer, heart failure, an organ transplant or severe kidney disease): Call your specialty clinic if you don't feel better in the next 2 days.  3.Know when to call 911: If your breathing is so bad that it keeps you from doing normal activities, call 911 or go to the emergency room. Tell them that you've been staying home and may have COVID-19.  4.Sign up for [employer]. We know it's scary to hear that you might have COVID-19. We want to track your symptoms to make sure you're okay over the next 2 weeks. Please look for an email from [employer]---this is a free, online program that we'll use to keep in touch. To sign up, follow the link in the email. Learn more at [email or web address].  Where can I get more information?  To learn more about COVID-19 and how to care for yourself at home, please visit the CDC website at https://www.cdc.gov/coronavirus/2019-ncov/about/steps-when-sick.html.  For more options for care at [facility], please visit our website at [email or web address]   If you are interested in becoming part of a [facility] clinic trial  related to COVID19 please go to [email or web address] for information, if you qualify.     Diagnosis: Acute upper respiratory infection, unspecified  Diagnosis ICD: J06.9  Prescription: benzonatate (Tessalon Perles) 100 mg oral capsule 21 capsule, 7 days supply. Take 1 capsule by mouth 3 times per day for 7 days as needed. Refills: 0, Refill as needed: no, Allow substitutions: yes  Pharmacy: [facility] #[id number] - [phone] - [address] [place] [address]

## 2020-05-10 ENCOUNTER — NURSE TRIAGE (OUTPATIENT)
Dept: NURSING | Facility: CLINIC | Age: 65
End: 2020-05-10

## 2020-05-11 ENCOUNTER — VIRTUAL VISIT (OUTPATIENT)
Dept: URGENT CARE | Facility: CLINIC | Age: 65
End: 2020-05-11
Payer: COMMERCIAL

## 2020-05-11 DIAGNOSIS — Z20.822 SUSPECTED COVID-19 VIRUS INFECTION: Primary | ICD-10-CM

## 2020-05-11 NOTE — PROGRESS NOTES
"The patient has been notified of following:     \"This telephone visit will be conducted via a call between you and your physician/provider. We have found that certain health care needs can be provided without the need for a physical exam.  This service lets us provide the care you need with a short phone conversation.  If a prescription is necessary we can send it directly to your pharmacy.      Telephone visits are billed at different rates depending on your insurance coverage. During this emergency period, for some insurers they may be billed the same as an in-person visit.  Please reach out to your insurance provider with any questions.    If during the course of the call the physician/provider feels a telephone visit is not appropriate, you will not be charged for this service.\"    Patient has given verbal consent for Telephone visit?  Yes      Subjective     CC: Valorie Hahn  is a 64 year old female who presents to clinic today for the following health issues:   Chief Complaint   Patient presents with     Suspected Covid   Started low grade fever week ago, on and off, but the last couple of days has been 'down for the count.' Also endorses dry cough and muscle aches. Last week she had friends over for a backyard get-together but maintained at least 8 feet apart and wiped down all surfaces and door handles between use. 101.5 is highest temperature recorded. Has been up and down over the last week but things have progressed over last two days with significant cough, headache, and myalgias. No nausea, vomiting, or diarrhea.     Partner is at home with her and they've not left house for 2 months. She is getting groceries and essentials from others and is food secure.      History:  In the 14 days before your symptoms started, have you had close contact with a COVID-19 (Coronavirus) patient? No    In the 14 days before your symptoms started, have you traveled internationally or to a state with high rates of " COVID19? No    Do you have a fever? Yes, I have a low grade fever (less than 100.4 degrees)    Are you having difficulty breathing? No    Do you have a cough? Yes      Are you coughing up any mucus? No, it's a dry cough    Endorses: Fever, Cough, Muscle aches, Change in sense of smell and Loss of appetite    Denies: Conjunctivitis    Do you smoke? No     Have you ever smoked? I have never smoked       Are there people you know with similar symptoms? No    Have you recently been hospitalized? No    Are you pregnant or breastfeeding?: No    Are you or a household member a healthcare worker who provides direct patient care or a ? No    Do you live in a residential facility like a nursing home or group home? No    Do you have a way to get food/medications if quarantined? Yes: Others are bringing food and they are ordering takeout only from restaurants. Her and partner are retired.    Patient Active Problem List   Diagnosis     Herpes simplex vulvovaginitis     Mild intermittent asthma without complication     CARDIOVASCULAR SCREENING; LDL GOAL LESS THAN 160     Family history of thyroid disease     Advanced directives, counseling/discussion     SI (sacroiliac) joint dysfunction     Osteitis, condensans     Sacroiliac joint disease     SI (sacroiliac) pain     Colon polyps     Dyspnea     Pain in joint involving ankle and foot, right     Posterior vitreous detachment     CME (cystoid macular edema), right     Cystoid macular edema, left eye     Finger mass, right     Intermediate uveitis of both eyes     Pseudophakia of both eyes     Current Outpatient Medications   Medication     acyclovir (ZOVIRAX) 400 MG tablet     azithromycin (ZITHROMAX) 500 MG tablet     bromfenac (BROMDAY) 0.09 % ophthalmic solution     difluprednate (DUREZOL) 0.05 % ophthalmic emulsion     HYDROcodone-acetaminophen (NORCO) 5-325 MG tablet     meloxicam (MOBIC) 7.5 MG tablet     UNABLE TO FIND     No current facility-administered  medications for this visit.      Allergies   Allergen Reactions     Penicillin G Hives       Reviewed and updated as needed this visit by Provider    Review of Systems   Constitutional, HEENT, cardiovascular, pulmonary, gi and gu systems are negative, except as otherwise noted.      Objective    Gen: Patient is alert, oriented  Resp: is speaking full sentences, with cough,  without audible shortness of breath, without wheezing      Assessment/Plan:  COVID-19 Rule Out  Given that Valorie is having symptoms consistent with COVID-19 (fever, dry cough, loss of smell, fatigue, body aches) and does have a pre-existing (asthma) that puts her at increased risk of morbidity, it is reasonable to have her be tested. She has proactively setup testing appointment with another facility for today and does not need testing from . She is also already signed up for the [facility] and has been providing updates there as well. Partner is also having similar symptoms and will be getting tested as well from same testing location. Patient was concerned about low oximeter levels (89-90%) and was reassured that because she can speak in full sentences and can walk around without difficulty that this is a better sign than what the pulse ox shows. Patient reassured that her increased thirst is normal reponse to infection and encouraged to keep drinking fluids ad libitum. Recommended patient continue to follow physical distancing and personal hygiene guidelines per CDC and also to practice physical distancing from her now symptomatic partner for at least 2-3 days as well. Patient verbalized understanding of the plan and expressed agreement.     My preceptor, Dr. Salvatore Ramos, was present for the assessment and plan discussion with the patient. I gave the patient's HPI in brief, with the patient present to verify my assessment and update her presenting signs and symptoms to him.        Valorie meets [facility] criteria for  COVID-19 PCR testing based on:  Symptoms: temperature >100.0, respiratory symptoms (e.g. cough, shortness of breath) and new, unexplained, profound myalgia  Population: Chronic disease: Chronic Lung Disease (COPD, Asthma, ILD)    Phone call duration: 23 minutes    Bhavik Foy, MS-4  [employer] [facility] [employer]  05/11/208:58 AM

## 2020-05-11 NOTE — TELEPHONE ENCOUNTER
Valorie calls and says that 1 week ago, she had chills, a fever, and a dry cough. Pt. Says that she currently has a fever = 101.5-oral, muscle aches, a dry cough, and tight lungs. Pt. Was conferenced with [facility]  for assistance in scheduling a virtual telephone visit with a Dr. COVID 19 Nurse Triage Plan/Patient Instructions    Please be aware that novel coronavirus (COVID-19) may be circulating in the community. If you develop symptoms such as fever, cough, or SOB or if you have concerns about the presence of another infection including coronavirus (COVID-19), please contact your health care provider or visit [email or web address].     Disposition/Instructions    Patient to have scheduled Telephone Visit with a provider. Follow System Ambulatory Workflow for COVID 19.     The clinic staff will assist you to schedule an appointment to complete the Telephone Visit with a provider during normal clinic hours.       Call Back If: Your symptoms worsen before you are able to complete your Telephone Visit with a provider.    Thank you for limiting contact with others, wearing a simple mask to cover your cough, practice good hand hygiene habits and accessing our virtual services where possible to limit the spread of this virus.    For more information about COVID19 and options for caring for yourself at home, please visit the CDC website at https://www.cdc.gov/coronavirus/2019-ncov/about/steps-when-sick.html  For more options for care at [facility], please visit our website at [email or web address]    For more information, please use the [place] Department of Health COVID-19 Website: https://www.health.state.[place]/diseases/coronavirus/index.html  [place] Department of Health ([facility]) COVID-19 Hotlines (Interpreters available):      Health questions: Phone Number: [phone] or [phone] and Hours: 7 a.m. to 7 p.m.    Schools and  questions: Phone Number: [phone] or  [phone] and Hours 7 a.m. to 7 p.m.                  Reason for Disposition    [1] Fever > 101 F (38.3 C) AND [2] age > 60    Additional Information    Negative: SEVERE difficulty breathing (e.g., struggling for each breath, speaks in single words)    Negative: Difficult to awaken or acting confused (e.g., disoriented, slurred speech)    Negative: Bluish (or gray) lips or face now    Negative: Shock suspected (e.g., cold/pale/clammy skin, too weak to stand, low BP, rapid pulse)    Negative: Sounds like a life-threatening emergency to the triager    Negative: [1] COVID-19 suspected (e.g., cough, fever, shortness of breath) AND [2] mild symptoms AND [3] public health department recommends testing    Negative: [1] COVID-19 exposure AND [2] no symptoms    Negative: COVID-19 and Breastfeeding, questions about    Negative: SEVERE or constant chest pain (Exception: mild central chest pain, present only when coughing)    Negative: MODERATE difficulty breathing (e.g., speaks in phrases, SOB even at rest, pulse 100-120)    Negative: Patient sounds very sick or weak to the triager    Negative: MILD difficulty breathing (e.g., minimal/no SOB at rest, SOB with walking, pulse <100)    Negative: Chest pain    Negative: Fever > 103 F (39.4 C)    Protocols used: CORONAVIRUS (COVID-19) DIAGNOSED OR [id number] 4.22.20

## 2020-05-12 ENCOUNTER — MYC MEDICAL ADVICE (OUTPATIENT)
Dept: FAMILY MEDICINE | Facility: CLINIC | Age: 65
End: 2020-05-12

## 2020-05-14 ENCOUNTER — E-VISIT (OUTPATIENT)
Dept: FAMILY MEDICINE | Facility: CLINIC | Age: 65
End: 2020-05-14
Payer: COMMERCIAL

## 2020-05-14 DIAGNOSIS — Z20.822 SUSPECTED COVID-19 VIRUS INFECTION: Primary | ICD-10-CM

## 2020-05-14 PROCEDURE — 99422 OL DIG E/M SVC 11-20 MIN: CPT | Performed by: NURSE PRACTITIONER

## 2020-05-15 ENCOUNTER — MYC MEDICAL ADVICE (OUTPATIENT)
Dept: FAMILY MEDICINE | Facility: CLINIC | Age: 65
End: 2020-05-15

## 2020-05-15 ENCOUNTER — NURSE TRIAGE (OUTPATIENT)
Dept: NURSING | Facility: CLINIC | Age: 65
End: 2020-05-15

## 2020-05-15 NOTE — TELEPHONE ENCOUNTER
Zuly,    Did you speak to this pt today? See her note today. I have left message for her to call back to triage. Just wanted your input    Kerrie Moore, RN, BSN

## 2020-05-15 NOTE — TELEPHONE ENCOUNTER
Zuly Verdugo did evisit with pt yesterday. Told might have Covid and she entered an order for test    Pt was called on listed phone as well as [phone]    Attempted to reach, unable. Left message  to call back.  Kerrie Moore RN

## 2020-05-15 NOTE — TELEPHONE ENCOUNTER
Pt has tested positive for COVID-19.  Low grade fever daily.  Her oxygen dips down to 87-88%.  She bought a pulse oximeter when she was dxed.   She is worried b/c she has asthma and wants to avoid getting pneumonia.  The test was a non-[facility] facility.    Reason for Disposition    MODERATE difficulty breathing (e.g., speaks in phrases, SOB even at rest, pulse 100-120)    Additional Information    Negative: SEVERE difficulty breathing (e.g., struggling for each breath, speaks in single words)    Negative: Difficult to awaken or acting confused (e.g., disoriented, slurred speech)    Negative: Bluish (or gray) lips or face now    Negative: Shock suspected (e.g., cold/pale/clammy skin, too weak to stand, low BP, rapid pulse)    Negative: Sounds like a life-threatening emergency to the triager    Negative: SEVERE or constant chest pain (Exception: mild central chest pain, present only when coughing)    Protocols used: CORONAVIRUS (COVID-19) DIAGNOSED OR [id number] 4.22.20    geno britt RN on 5/15/2020 at 6:29 PM

## 2020-05-18 ENCOUNTER — TELEPHONE (OUTPATIENT)
Dept: FAMILY MEDICINE | Facility: CLINIC | Age: 65
End: 2020-05-18

## 2020-05-18 ENCOUNTER — MYC MEDICAL ADVICE (OUTPATIENT)
Dept: FAMILY MEDICINE | Facility: CLINIC | Age: 65
End: 2020-05-18

## 2020-05-18 NOTE — TELEPHONE ENCOUNTER
Please see 5/15 triage notes for details.  The writer was not able to addend triage notes. Please follow up with the patient.

## 2020-05-18 NOTE — TELEPHONE ENCOUNTER
I did not speak with this pt, I do not see positive covid test in results history.  oncare visit 5/7 states pt does not qualify for testing.  I would not recommend O2 unless she is having significant resp distress, in that case would recommend in person evaluation for physical exam and xray.    Zuly Verdugo, CNP

## 2020-05-19 NOTE — TELEPHONE ENCOUNTER
"Lisbeth/ providers-Please review and avdise:  1. Patient reports oxygen level at home is 89-90% and is requesting office visit for supplemental oxygen orders due to positive COVID-19 test (unclear where this testing occurred) and history of asthma   A. Per 5/11/20 virtual visit:  \"She has proactively setup testing appointment with another facility for today and does not need testing from .\"  2. Would it be possible to have patient come in for in-person visit with a provider this week and complete orders for oxygen?  It is writer's understanding patient's insurance will likely require particular forms be completed to show medical necessity  3. Patient sent multiple MyChart encounters (5/12/20, 5/15/20) regarding this topic and was encouraged to go to ER if having any difficulty breathing/concerns about oxygen levels.    Thank you!  BITA Leavitt, FIORDALIZAN, RN    "

## 2020-05-19 NOTE — TELEPHONE ENCOUNTER
Writer called patient, reviewed JOSE Pan's recommendation for video visit and offered first available opening on 5/20/20 at 1000.      Patient verbalized understanding and in agreement with plan.    Video visit scheduled at 1000 on 5/20/20.  Patient informed she will be contacted by clinic staff member to go through consent for video visit and to receive instructions on setting up [employer] gagan.    FOIRDALIZA MooreN, RN

## 2020-05-20 ENCOUNTER — VIRTUAL VISIT (OUTPATIENT)
Dept: FAMILY MEDICINE | Facility: CLINIC | Age: 65
End: 2020-05-20
Payer: COMMERCIAL

## 2020-05-20 DIAGNOSIS — U07.1 2019 NOVEL CORONAVIRUS DISEASE (COVID-19): Primary | ICD-10-CM

## 2020-05-20 DIAGNOSIS — J45.20 MILD INTERMITTENT ASTHMA WITHOUT COMPLICATION: ICD-10-CM

## 2020-05-20 PROCEDURE — 99213 OFFICE O/P EST LOW 20 MIN: CPT | Mod: 95 | Performed by: NURSE PRACTITIONER

## 2020-05-20 NOTE — PATIENT INSTRUCTIONS
"Patient Education   Coping with Life after COVID-19  Being in the hospital because of COVID-19 is scary. Going home can be scary, too. You may face changes to your life, the way you work or what you can eat.   It's hard to adjust to change, and it's normal to feel afraid, frustrated or even angry. These feelings usually go away over time. If your feelings don't start to get better, it's called \"adjustment disorder.\"   What signs should I look out for?  Adjustment disorder can happen to anyone in a time of stress. It makes it hard to cope with daily life. You may feel lonely or fight with loved ones, even if you're glad to be home.   Watch for these signs:    Fear or worry    Hard time focusing    Sadness or anger    Trouble talking to family or friends    Feeling like you don't fit in or isolating yourself    Problems with sleep    Drinking alcohol or taking drugs to cope  What can I do?  You can help yourself get better. Feeling you have control helps you move forward. You may wonder if you'll be able to do things you did before. Be patient. Do your best to make the most of every day. Try to build relationships, be as active as you can, eat right and keep a sense of humor. Avoid smoking and drinking too much alcohol.   Call your family doctor or clinic if you're not sure what to do. They can guide you to care or other services.  When should I get help?  Think about getting counseling if your sadness or frustration gets worse. Together with a trained counselor, you can talk about your problems adjusting to life after your hospital stay. You can come up with new ways to handle changes that give you more control.   Get help if you're thinking about hurting yourself. If you need help right away, call 911 or go to the nearest emergency room. You can also try the Crisis Text Line.  Crisis Text Line: 448-958 (http://www.crisistextline.org)  The Crisis Text Line serves anyone, in any crisis. It gives free, 24/7 support. " "Here's how it works:  1. Text HOME to 336016 from anywhere in the [place], anytime, about any type of crisis.  2. A live, trained Crisis Counselor will text you back quickly.  3. The volunteer Crisis Counselor can help you move from a \"hot moment\" to a \"cool moment.\" They can also help you work out a safety plan.  For informational purposes only. Not to replace the advice of your health care provider. Copyright   2020 [facility]. All rights reserved. Clinically reviewed by the Ambulatory COVID-19 Care Map Team. [employer] 651891 - 04/20.       "

## 2020-05-20 NOTE — PROGRESS NOTES
"Valorie Hahn is a 64 year old female who is being evaluated via a billable video visit.      The patient has been notified of following:     \"This video visit will be conducted via a call between you and your physician/provider. We have found that certain health care needs can be provided without the need for an in-person physical exam.  This service lets us provide the care you need with a video conversation.  If a prescription is necessary we can send it directly to your pharmacy.  If lab work is needed we can place an order for that and you can then stop by our lab to have the test done at a later time.    Video visits are billed at different rates depending on your insurance coverage.  Please reach out to your insurance provider with any questions.    If during the course of the call the physician/provider feels a video visit is not appropriate, you will not be charged for this service.\"    Patient has given verbal consent for Video visit? Yes    How would you like to obtain your AVS? Catalino    Patient would like the video invitation sent by: Send to e-mail at: [email or web address]    Will anyone else be joining your video visit? No    Subjective     Valorie Hahn is a 64 year old female who presents today via video visit for the following health issues:    HPI  Chief Complaint   Patient presents with     Breathing Problem     x2 weeks     Valorie has had flu like s for 2 weeks.  One week ago she was tested for coronavirus and she was positive.  Her partner, Lesley, also has tested positive for coronavirus but she is less ill.  Valorie is doing this video visit today due to lingering symptoms of her COVID    Today she is on Day 3 of no fever and overall she is feeling bettter.   Her oxygen levels have been 90-91%.  She had called our clinic several days in a row and was asking for an order for oxygen to use at home, and no one was responding to her or allowing her to have oxygen.  She is not " "sure why she cannot have a prescription for oxygen.    Treadmill one mile per day, \"It is all I can do.\"    She will feel good for a few hours and then \"I will feel like crap again.\"      Video Start Time: 10:06 AM        Patient Active Problem List   Diagnosis     Herpes simplex vulvovaginitis     Mild intermittent asthma without complication     CARDIOVASCULAR SCREENING; LDL GOAL LESS THAN 160     Family history of thyroid disease     Advanced directives, counseling/discussion     SI (sacroiliac) joint dysfunction     Osteitis, condensans     Sacroiliac joint disease     SI (sacroiliac) pain     Colon polyps     Dyspnea     Pain in joint involving ankle and foot, right     Posterior vitreous detachment     CME (cystoid macular edema), right     Cystoid macular edema, left eye     Finger mass, right     Intermediate uveitis of both eyes     Pseudophakia of both eyes     Past Surgical History:   Procedure Laterality Date     C APPENDECTOMY       C NONSPECIFIC PROCEDURE      Anal fissure repair     COLONOSCOPY      some polyps removed     COLONOSCOPY WITH CO2 INSUFFLATION N/A 1/3/2019    Procedure: COLONOSCOPY WITH CO2 INSUFFLATION;  Surgeon: Arnold Adler MD;  Location: [facility]     EXCISE MASS FINGER Right 12/12/2019    Procedure: RIght ring  finger mass excision;  Surgeon: Karel Valle MD;  Location: [facility] OR     HC TOOTH EXTRACTION W/FORCEP       PHACOEMULSIFICATION WITH STANDARD INTRAOCULAR LENS IMPLANT Left 3/1/2019    Procedure: Left Eye Cataract Removal with Intraocular Lens Implant;  Surgeon: Danna Garcia MD;  [facility]     PHACOEMULSIFICATION WITH STANDARD INTRAOCULAR LENS IMPLANT Right 3/8/2019    Procedure: Right Eye Cataract Removal with Intraocular Lens Implant;  Surgeon: Danna Garcia MD;  Location: [facility]       Social History     Tobacco Use     Smoking status: Former Smoker     Packs/day: 0.50     Years: 10.00     Pack years: 5.00     Types: Cigarettes     Start date: " 1990     Last attempt to quit: 2000     Years since quittin.0     Smokeless tobacco: Never Used   Substance Use Topics     Alcohol use: Yes     Comment: 1-2 glasses of wine per night     Family History   Problem Relation Age of Onset     Respiratory Mother         emphysema     Mental Illness Mother      Thyroid Disease Mother      Thyroid Disease Mother         hypothyroid, takes supplement     Cerebrovascular Disease Mother      Depression Mother      Mental Illness Mother      Glaucoma Mother      Mental Illness Maternal Grandmother      Depression Maternal Grandmother      Diabetes Father         type 2     Heart Disease Father         bypass surgeries x 2     Other Cancer Father         non hodgkins     Skin Cancer Father      Thyroid Disease Sister         hypothyroid     Thyroid Disease Sister         hypothyroid     Psychotic Disorder Sister         commited suicide, depression     Cancer Brother         esophogeal cancer, work exposure to chemicals     Blood Disease Brother          hiv  aids     Blood Disease Brother         hep c     Blood Disease Brother         hiv positive, passed away     Family History Negative Brother      Diabetes Brother      Mental Illness Sister      Thyroid Disease Sister      Thyroid Disease Sister      Diabetes Brother         type 2     Depression Sister      Depression Sister      Depression Brother      Melanoma No family hx of      Macular Degeneration No family hx of          Current Outpatient Medications   Medication Sig Dispense Refill     acyclovir (ZOVIRAX) 400 MG tablet Take 1 tablet (400 mg) by mouth 2 times daily For 3 days with each outbreak. 6 tablet 11     bromfenac (BROMDAY) 0.09 % ophthalmic solution Place 1 drop into the right eye daily 5 mL 3     difluprednate (DUREZOL) 0.05 % ophthalmic emulsion Place 1 drop into the right eye 2 times daily 1 Bottle 3     meloxicam (MOBIC) 7.5 MG tablet Take 1 tablet (7.5 mg) by mouth daily 30 tablet 11  "    azithromycin (ZITHROMAX) 500 MG tablet Take 1 tablet daily x 3 days for traveler's diarrhea 3 tablet 0     HYDROcodone-acetaminophen (NORCO) 5-325 MG tablet Take 1 tablet by mouth every 6 hours as needed for severe pain 10 tablet 0     UNABLE TO FIND Place 1 drop into both eyes At Bedtime Serum tears       Allergies   Allergen Reactions     Penicillin G Hives     Recent Labs   Lab Test 11/12/19  0958 09/04/19  1150 05/07/19  0830 03/29/19  0905 08/02/17  1502 08/10/16  1106 06/16/15  1010  05/14/13  1217   A1C 5.0  --   --   --   --   --   --   --   --    LDL  --   --   --   --   --  123* 96  --  96   HDL  --   --   --   --   --  69 67  --  68   TRIG  --   --   --   --   --  114 125  --  95   ALT  --  23  --  24 21  --   --   --   --    CR  --  0.86 0.76 0.63 0.60  --  0.67   < >  --    GFRESTIMATED  --  72 83 >90 >90  --  >90  Non  GFR Calc     < >  --    GFRESTBLACK  --  84 >90 >90 >90  --  >90  African American GFR Calc     < >  --    POTASSIUM  --  4.2 4.2  --  3.9  --  4.2   < >  --    TSH  --   --  4.02*  --   --  3.08 2.68   < >  --     < > = values in this interval not displayed.      BP Readings from Last 3 Encounters:   12/12/19 136/86   12/10/19 113/71   11/18/19 110/68    Wt Readings from Last 3 Encounters:   12/10/19 83 kg (183 lb)   11/26/19 73.5 kg (162 lb)   11/18/19 73.5 kg (162 lb)                    Reviewed and updated as needed this visit by Provider  Tobacco  Allergies  Meds  Problems  Med Hx  Surg Hx  Fam Hx         Review of Systems   Constitutional, HEENT, cardiovascular, pulmonary, GI, , musculoskeletal, neuro, skin, endocrine and psych systems are negative, except as otherwise noted.      Objective    LMP 02/14/2010   Estimated body mass index is 31.41 kg/m  as calculated from the following:    Height as of 12/10/19: 1.626 m (5' 4\").    Weight as of 12/10/19: 83 kg (183 lb).  Physical Exam     GENERAL: Healthy, alert and no distress  EYES: Eyes grossly normal " to inspection.  No discharge or erythema, or obvious scleral/conjunctival abnormalities.  RESP: No audible wheeze, cough, or visible cyanosis.  No visible retractions or increased work of breathing.    SKIN: Visible skin clear. No significant rash, abnormal pigmentation or lesions.  NEURO: Cranial nerves grossly intact.  Mentation and speech appropriate for age.  PSYCH: Mentation appears normal, affect normal/bright, judgement and insight intact, normal speech and appearance well-groomed.      Diagnostic Test Results:  Labs reviewed in Epic        Assessment & Plan     (U07.1) 2019 novel coronavirus disease (COVID-19)  (primary encounter diagnosis)  Comment: Improving  Plan:   I reassured Valorie that I am very happy today she is feeling better.  We talked at length about COVID-19 illness, recovery, and taking good care of her self.  I did tell her that I am not surprised she has had symptoms/feeling up and down with her energy level and not feeling well.  I did tell her I believe this will last for another couple of weeks.  In the meantime, I do want her to watch her symptoms closely.  In the event that she develops any respiratory distress or distress, she is to go to the ER.  Otherwise I reassured her today that her breathing seemed easy going, she is in good spirits, and I am not alarmed or worried about her breathing status today.  I did tell her that I am not surprised her energy level has been low when she did not tolerate her treadmill exercise.  I did talk to her about this with taking her time with the illness, doing only low impact/low strenuous activity.  I do think she will be able get back to her treadmill within the next 2 to 4 weeks I want her to take that slowly as well.    Regarding oxygen supplementation: I talked to her at length about adding oxygen supplementation, when and why is indicated, and why we proceed with caution.  I did talk to her about decreased respiratory drive when people have  "oxygen supplementation and since she is breathing so comfortably today, she does not need extra oxygen.  Instead, I do want her to manage her asthma with her inhalers, her daily inhaled corticosteroid, using her nebulizer intermittently etc.  She is comfortable with this and states she will keep take care of her asthma and will watch her breathing status closely.    Questions were answered.  She will follow-up with me as needed, otherwise I will expect that she is getting better if I do not hear from her or see her.    (J45.20) Mild intermittent asthma without complication  Comment: Chronic/controlled  Plan: As above         BMI:   Estimated body mass index is 31.41 kg/m  as calculated from the following:    Height as of 12/10/19: 1.626 m (5' 4\").    Weight as of 12/10/19: 83 kg (183 lb).         See Patient Instructions    Return in about 5 days (around 5/25/2020), or if symptoms worsen or fail to improve.    BC Cuevas CNP  [facility]      Video-Visit Details    Type of service:  Video Visit    Video End Time:10:28 AM    Originating Location (pt. Location): Home    Distant Location (provider location):  [facility]     Platform used for Video Visit: AmWell    Return in about 5 days (around 5/25/2020), or if symptoms worsen or fail to improve.       BC Cuevas CNP        "

## 2020-06-20 NOTE — PATIENT INSTRUCTIONS
Continue to stay home and monitor symptoms, please do not hesitate to call if symptoms worsen or if you feel that you cannot walk around your home without being severely short of breath, or if you feel you may have illness severe enough for you to warrant going to the hospital, call [phone]

## 2020-08-05 ENCOUNTER — OFFICE VISIT (OUTPATIENT)
Dept: OPHTHALMOLOGY | Facility: CLINIC | Age: 65
End: 2020-08-05
Attending: OPHTHALMOLOGY
Payer: COMMERCIAL

## 2020-08-05 DIAGNOSIS — H35.353 CYSTOID MACULAR EDEMA, BOTH EYES: Primary | ICD-10-CM

## 2020-08-05 DIAGNOSIS — H35.371 EPIRETINAL MEMBRANE, RIGHT EYE: ICD-10-CM

## 2020-08-05 DIAGNOSIS — Z96.1 PSEUDOPHAKIA, BOTH EYES: ICD-10-CM

## 2020-08-05 DIAGNOSIS — H30.23 INTERMEDIATE UVEITIS OF BOTH EYES: ICD-10-CM

## 2020-08-05 PROCEDURE — G0463 HOSPITAL OUTPT CLINIC VISIT: HCPCS | Mod: ZF

## 2020-08-05 PROCEDURE — 92134 CPTRZ OPH DX IMG PST SGM RTA: CPT | Mod: ZF | Performed by: OPHTHALMOLOGY

## 2020-08-05 RX ORDER — PREDNISOLONE ACETATE 10 MG/ML
1-2 SUSPENSION/ DROPS OPHTHALMIC 4 TIMES DAILY
Qty: 1 BOTTLE | Refills: 11 | Status: SHIPPED | OUTPATIENT
Start: 2020-08-05 | End: 2020-09-23

## 2020-08-05 ASSESSMENT — REFRACTION_WEARINGRX
OD_AXIS: 024
OS_ADD: +2.50
OD_SPHERE: -1.50
OD_CYLINDER: +0.25
OS_SPHERE: -0.75
OD_ADD: +2.50
OS_CYLINDER: +0.75
SPECS_TYPE: PAL
OS_AXIS: 150

## 2020-08-05 ASSESSMENT — EXTERNAL EXAM - LEFT EYE: OS_EXAM: NORMAL

## 2020-08-05 ASSESSMENT — SLIT LAMP EXAM - LIDS
COMMENTS: NORMAL
COMMENTS: NORMAL

## 2020-08-05 ASSESSMENT — TONOMETRY
OD_IOP_MMHG: 10
OS_IOP_MMHG: 12
IOP_METHOD: ICARE

## 2020-08-05 ASSESSMENT — CONF VISUAL FIELD
OD_NORMAL: 1
OS_NORMAL: 1

## 2020-08-05 ASSESSMENT — VISUAL ACUITY
CORRECTION_TYPE: GLASSES
METHOD_MR: DEFERRED BY PT
OD_CC: 20/20
METHOD: SNELLEN - LINEAR
OD_CC+: -2
OS_CC: 20/20

## 2020-08-05 ASSESSMENT — CUP TO DISC RATIO
OS_RATIO: 0.15
OD_RATIO: 0.25

## 2020-08-05 ASSESSMENT — EXTERNAL EXAM - RIGHT EYE: OD_EXAM: NORMAL

## 2020-08-05 NOTE — PROGRESS NOTES
CC: ERM and post op CME  HPI: Valorie Hahn is a  64 year old year-old patient with history of cme after cataract surgery in each eye. Complaining today of waviness in vision right eye.     OCULAR HISTORY  CEIOL 3/8/2019 OD, 3/1/2019 OS - Dr. Garcia   -Postop CME each eye, OD > OS  S/p YAG cap OD 4/23/20    Retinal Imaging:  OCT 8-5-20  OD - 1+ ERM with irregular foveal contour; trace CME - worse c/t 4/23/20.   OS - VMA. Retina normal - stable    Assessment & Plan:       1. Recurrent CME, OD   -Onset post CEIOL   -Recurrent despite prolonged taper   - discussed with patient treatment options: periocular kenalog vs restart topical medication vs observation vs intravitreal injections vs Epiretinal membrane peel    - patient symptomatic even with 20/20 vision   - after discussed with patient treatment options the decision was to restart eyedrops    -restart Predforte  Four times a day    - recommend  Predforte with slow Tapering and switching to pred- mild      2. H/o CME, OS   -Onset post-CEIOL   -No IRF today   -Monitor    3. ERM, OD   -VA 20/20   -Defer surgery due to good vision    4. VMA, OS   -Monitor    5. Pseudophakia, OU   -Dr. Garcia - OD 3/8/2019, OS 3/1/2019    -S/p YAG cap OD 4/2020   -Clear visual axis    - Predforte  Four times a day    - follow up in 4 weeks; sooner as needed     Jung Turcios MD  Vitreoretinal Surgery Fellow  [facility]     ~~~~~~~~~~~~~~~~~~~~~~~~~~~~~~~~~~   Complete documentation of historical and exam elements from today's encounter can be found in the full encounter summary report (not reduplicated in this progress note).  I personally obtained the chief complaint(s) and history of present illness.  I confirmed and edited as necessary the review of systems, past medical/surgical history, family history, social history, and examination findings as documented by others; and I examined the patient myself.  I personally reviewed the relevant tests, images,  and reports as documented above.  I personally reviewed the ophthalmic test(s) associated with this encounter, agree with the interpretation(s) as documented by the resident/fellow, and have edited the corresponding report(s) as necessary.   I formulated and edited as necessary the assessment and plan and discussed the findings and management plan with the patient and family    Priya Tabares MD   of Ophthalmology.  Retina Service   Department of Ophthalmology and Visual Neurosciences   [facility]  Phone: [phone]   Fax: [phone]     .

## 2020-08-05 NOTE — LETTER
8/5/2020       RE: Valorie Hahn  [address]  [place] [address]     Dear Colleague,    Thank you for referring your patient, Valorie Hahn, to the EYE CLINIC at [facility]. Please see a copy of my visit note below.       CC: ERM and post op CME  HPI: Valorie Hahn is a  64 year old year-old patient with history of cme after cataract surgery in each eye. Complaining today of waviness in vision right eye.     OCULAR HISTORY  CEIOL 3/8/2019 OD, 3/1/2019 OS - Dr. Garcia   -Postop CME each eye, OD > OS  S/p YAG cap OD 4/23/20    Retinal Imaging:  OCT 8-5-20  OD - 1+ ERM with irregular foveal contour; trace CME - worse c/t 4/23/20.   OS - VMA. Retina normal - stable    Assessment & Plan:       1. Recurrent CME, OD   -Onset post CEIOL   -Recurrent despite prolonged taper   - discussed with patient treatment options: periocular kenalog vs restart topical medication vs observation vs intravitreal injections vs Epiretinal membrane peel    - patient symptomatic even with 20/20 vision   - after discussed with patient treatment options the decision was to restart eyedrops    -restart Predforte  Four times a day    - recommend  Predforte with slow Tapering and switching to pred- mild      2. H/o CME, OS   -Onset post-CEIOL   -No IRF today   -Monitor    3. ERM, OD   -VA 20/20   -Defer surgery due to good vision    4. VMA, OS   -Monitor    5. Pseudophakia, OU   -Dr. Garcia - OD 3/8/2019, OS 3/1/2019    -S/p YAG cap OD 4/2020   -Clear visual axis    - Predforte  Four times a day    - follow up in 4 weeks; sooner as needed     Jung Turcios MD  Vitreoretinal Surgery Fellow  [facility]     ~~~~~~~~~~~~~~~~~~~~~~~~~~~~~~~~~~   Complete documentation of historical and exam elements from today's encounter can be found in the full encounter summary report (not reduplicated in this progress note).  I personally obtained the chief complaint(s) and  history of present illness.  I confirmed and edited as necessary the review of systems, past medical/surgical history, family history, social history, and examination findings as documented by others; and I examined the patient myself.  I personally reviewed the relevant tests, images, and reports as documented above.  I personally reviewed the ophthalmic test(s) associated with this encounter, agree with the interpretation(s) as documented by the resident/fellow, and have edited the corresponding report(s) as necessary.   I formulated and edited as necessary the assessment and plan and discussed the findings and management plan with the patient and family    Priya Tabares MD   of Ophthalmology.  Retina Service   Department of Ophthalmology and Visual Neurosciences   [facility]  Phone: [phone]   Fax: [phone]     .      Again, thank you for allowing me to participate in the care of your patient.      Sincerely,    Priya Tabares MD

## 2020-08-26 NOTE — PROGRESS NOTES
HPI:  Valorie Hahn is a 63 year old female s/p CEIOL left eye 3/1/19 and right eye 3/8/19. She notes her vision remains blurred in the right eye. Left eye is doing well. No pain, redness, discharge. No flashes/floaters.     POH: S/p CE/IOL both eyes 3/2019  PMH: Intermittent asthma  FH: Brother with strabismus and amblyopia s/p strabismus surgery as a child.    ASSESSMENT & PLAN:  (Z96.1) Pseudophakia, both eyes  (primary encounter diagnosis)  (H35.351) CME (cystoid macular edema), right  Comment: S/p CE/IOL left eye 3/1/19 with post-operative CME at 4/15/19 follow-up which resolved by 5/13/19 follow-up with topical drops.    S/p CE/IOL right eye 3/8/19 with possible late Justin Denise CME 8/5/19 which resolved with topical prednisolone and ketorolac on follow-up 9/11/19, but with ketorolac-related epitheliopathy, so gtts discontinued. Now with improved surface off ketorolac, but recurrent CME right eye today.    Plan: Avoid ketorolac for now, re-start topical prednisolone 4x daily. If resolved next visit, consider slow monthly taper. If does not resolve, consider retina referral.    Patient disposition: Return in about 4 weeks (around 11/5/2019) for dilation right eye and macula OCT OD .     Teaching statement:  Complete documentation of historical and exam elements from today's encounter can be found in the full encounter summary report (not reduplicated in this progress note). I personally obtained the chief complaint(s) and history of present illness.  I confirmed and edited as necessary the review of systems, past medical/surgical history, family history, social history, and examination findings as documented by others; and I examined the patient myself. I personally reviewed the relevant tests, images, and reports as documented above.     I formulated and edited as necessary the assessment and plan and discussed the findings and management plan with the patient and family.    Danna Garcia,  MD  Comprehensive Ophthalmology & Ocular Pathology  Department of Ophthalmology and Visual Neurosciences  [email or web address]  Pager [phone]       No

## 2020-09-23 ENCOUNTER — OFFICE VISIT (OUTPATIENT)
Dept: OPHTHALMOLOGY | Facility: CLINIC | Age: 65
End: 2020-09-23
Attending: OPHTHALMOLOGY
Payer: COMMERCIAL

## 2020-09-23 DIAGNOSIS — H35.353 CYSTOID MACULAR EDEMA, BOTH EYES: Primary | ICD-10-CM

## 2020-09-23 PROCEDURE — 92134 CPTRZ OPH DX IMG PST SGM RTA: CPT | Mod: ZF | Performed by: OPHTHALMOLOGY

## 2020-09-23 PROCEDURE — G0463 HOSPITAL OUTPT CLINIC VISIT: HCPCS | Mod: ZF

## 2020-09-23 ASSESSMENT — VISUAL ACUITY
METHOD: SNELLEN - LINEAR
OD_CC+: +1
OS_CC: 20/20
OD_PH_CC+: -3
OS_CC+: -3
OD_PH_CC: 20/25
OD_CC: 20/50

## 2020-09-23 ASSESSMENT — REFRACTION_WEARINGRX
OD_SPHERE: -1.50
OS_ADD: +2.50
OS_SPHERE: -0.75
OD_CYLINDER: +0.25
OD_ADD: +2.50
OD_AXIS: 024
OS_CYLINDER: +0.75
OS_AXIS: 150
SPECS_TYPE: PAL

## 2020-09-23 ASSESSMENT — TONOMETRY
OS_IOP_MMHG: 15
OD_IOP_MMHG: 15
IOP_METHOD: TONOPEN

## 2020-09-23 ASSESSMENT — CONF VISUAL FIELD
OD_NORMAL: 1
OS_NORMAL: 1
METHOD: COUNTING FINGERS

## 2020-09-23 ASSESSMENT — SLIT LAMP EXAM - LIDS
COMMENTS: NORMAL
COMMENTS: NORMAL

## 2020-09-23 ASSESSMENT — EXTERNAL EXAM - RIGHT EYE: OD_EXAM: NORMAL

## 2020-09-23 ASSESSMENT — EXTERNAL EXAM - LEFT EYE: OS_EXAM: NORMAL

## 2020-09-23 ASSESSMENT — CUP TO DISC RATIO
OD_RATIO: 0.25
OS_RATIO: 0.15

## 2020-09-23 NOTE — PROGRESS NOTES
"   CC: ERM and post op CME  HPI: Valorie Hahn is a  64 year old year-old patient with history of cme after cataract surgery in each eye. Complaining today of waviness in vision right eye.     Interval History:  Left eye vision is perfect.  Right eye vision \"still crappy but improved\" despite 20/25 on eye chart.        OCULAR HISTORY  CEIOL 3/8/2019 OD, 3/1/2019 OS - Dr. Garcia   -Postop CME each eye, OD > OS  S/p YAG cap OD 4/23/20    Retinal Imaging:  OCT 9-23-20  OD - 1+ ERM with irregular foveal contour; trace CME - better c/t 8/5/2020.   OS - VMA. Retina normal - stable    Assessment & Plan:       1. Recurrent CME, OD   -Onset post CEIOL   -Recurrent despite prolonged taper   - discussed with patient treatment options: periocular kenalog vs restart topical medication vs observation vs intravitreal injections vs Epiretinal membrane peel    - patient symptomatic even with 20/20 vision   - after discussed with patient treatment options the decision was to restart eyedrops    - improved s/p 4-3-2-1 predforte taper 9/23/2020   - begin pred mild daily right eye   - return in 2 months for VTD + Optical Coherence Tomography Mac + manifest refraction      2. H/o CME, OS   -Onset post-CEIOL   -No IRF today   -Monitor    3. ERM, OD   -VA 20/25-3; improves with pinhole   -Defer surgery due to good vision    4. VMA, OS   -Monitor    5. Pseudophakia, OU   -Dr. Garcia - OD 3/8/2019, OS 3/1/2019    -S/p YAG cap OD 4/2020   -Clear visual axis    return in 2 months for VTD + Optical Coherence Tomography Mac + manifest refraction     Lukas Bee MD  Ophthalmology Resident      ~~~~~~~~~~~~~~~~~~~~~~~~~~~~~~~~~~   Complete documentation of historical and exam elements from today's encounter can be found in the full encounter summary report (not reduplicated in this progress note).  I personally obtained the chief complaint(s) and history of present illness.  I confirmed and edited as necessary the review of systems, " past medical/surgical history, family history, social history, and examination findings as documented by others; and I examined the patient myself.  I personally reviewed the relevant tests, images, and reports as documented above.  I personally reviewed the ophthalmic test(s) associated with this encounter, agree with the interpretation(s) as documented by the resident/fellow, and have edited the corresponding report(s) as necessary.   I formulated and edited as necessary the assessment and plan and discussed the findings and management plan with the patient and family    Priya Tabares MD   of Ophthalmology.  Retina Service   Department of Ophthalmology and Visual Neurosciences   [facility]  Phone: [phone]   Fax: [phone]     .

## 2020-09-23 NOTE — NURSING NOTE
Chief Complaints and History of Present Illnesses   Patient presents with     Cystoid Macular Edema Follow Up     Chief Complaint(s) and History of Present Illness(es)     Cystoid Macular Edema Follow Up     Laterality: both eyes    Associated symptoms: dryness and floaters (no new ones).  Negative for eye pain, redness, flashes and foreign body sensation    Response to treatment: no improvement    Pain scale: 0/10              Comments     Valorie is here to continue care for Cystoid macular edema, both eyes. Sh feels vision is about the same as last visit.  She says since cataract surgery RE, she says when she moves her head from side to side, her vision sometimes takes time to change.     Jax Vallejo COT 2:39 PM September 23, 2020

## 2020-09-24 ENCOUNTER — TELEPHONE (OUTPATIENT)
Dept: OPHTHALMOLOGY | Facility: CLINIC | Age: 65
End: 2020-09-24

## 2020-09-24 RX ORDER — FLUOROMETHOLONE 0.1 %
1 SUSPENSION, DROPS(FINAL DOSAGE FORM)(ML) OPHTHALMIC (EYE) DAILY
Qty: 1 BOTTLE | Refills: 6 | Status: SHIPPED | OUTPATIENT
Start: 2020-09-24 | End: 2021-01-29

## 2020-09-24 NOTE — TELEPHONE ENCOUNTER
Central Prior Authorization Team   Phone: [phone]      PA Initiation    Medication: Pred Mild 0.12%-PA initiated  Insurance Company: [employer] - Phone [phone] Fax [phone]  Pharmacy Filling the Rx: [facility] - [place], [place] - [address]  Filling Pharmacy Phone: [phone]  Filling Pharmacy Fax:    Start Date: 9/24/2020

## 2020-09-24 NOTE — TELEPHONE ENCOUNTER
Prior Authorization Retail Medication Request    Medication/Dose: pred mild 0.12%  ICD code (if different than what is on RX):    Previously Tried and Failed:    Rationale:      Ins[facility] Name[facility]  [facility]  [facility] ID:  [id number]      Pharmacy Information (if different than what is on RX)  Name:  [facility]  Phone:  [phone]

## 2020-09-24 NOTE — TELEPHONE ENCOUNTER
PRIOR AUTHORIZATION DENIED    Medication: Pred Mild 0.12%-PA denied    Denial Date: 9/24/2020    Denial Rational:         Appeal Information:

## 2020-09-30 ENCOUNTER — MYC MEDICAL ADVICE (OUTPATIENT)
Dept: FAMILY MEDICINE | Facility: CLINIC | Age: 65
End: 2020-09-30

## 2020-09-30 DIAGNOSIS — Z12.31 VISIT FOR SCREENING MAMMOGRAM: ICD-10-CM

## 2020-09-30 PROCEDURE — 77067 SCR MAMMO BI INCL CAD: CPT

## 2020-10-01 NOTE — TELEPHONE ENCOUNTER
I marked it as discontinued - completed by outside facility.  Sent My Chart message to patient.  Josseline Esparza RN

## 2020-11-07 ENCOUNTER — HEALTH MAINTENANCE LETTER (OUTPATIENT)
Age: 65
End: 2020-11-07

## 2020-12-04 ENCOUNTER — MYC MEDICAL ADVICE (OUTPATIENT)
Dept: FAMILY MEDICINE | Facility: CLINIC | Age: 65
End: 2020-12-04

## 2020-12-04 DIAGNOSIS — M53.3 SI (SACROILIAC) JOINT DYSFUNCTION: ICD-10-CM

## 2020-12-04 DIAGNOSIS — A60.04 HERPES SIMPLEX VULVOVAGINITIS: ICD-10-CM

## 2020-12-04 NOTE — TELEPHONE ENCOUNTER
My chart message sent to pt to see if she is currently on these meds  They say d/c'd on 9/23/20  Thanks!     Allyn Cavazos RN

## 2020-12-04 NOTE — TELEPHONE ENCOUNTER
meloxicam (MOBIC) 7.5 MG tablet (Discontinued)      Last Written Prescription Date:  5-7-19  Last Fill Quantity: 30 tab,   # refills: 11  Last Office Visit: 9-30-20  Future Office visit:    Next 5 appointments (look out 90 days)    Dec 08, 2020  2:40 PM  MyCmadisont Short with BC Benedict CNP  [facility] ([facility]) [address] [place] [address]  [phone]           Routing refill request to provider for review/approval because:  Drug not active on patient's medication list    ____________________________________      acyclovir (ZOVIRAX) 400 MG tablet (Discontinued)      Last Written Prescription Date:  5-7-19  Last Fill Quantity: 6 tab,   # refills: 11  Last Office Visit: 9-30-20  Future Office visit:    Next 5 appointments (look out 90 days)    Dec 08, 2020  2:40 PM  Catalino Kaplan with BC Benedict CNP  [facility] ([facility]) [address] [place] [address]  [phone]           Routing refill request to provider for review/approval because:  Drug not active on patient's medication list

## 2020-12-08 RX ORDER — ACYCLOVIR 400 MG/1
400 TABLET ORAL 2 TIMES DAILY
Qty: 6 TABLET | Refills: 1 | Status: SHIPPED | OUTPATIENT
Start: 2020-12-08 | End: 2021-03-26

## 2020-12-08 NOTE — TELEPHONE ENCOUNTER
Lisbeth Ruiz CNP  Ok for a mobic refill?  Pt last office visit 5/20 for covid  I did let her know she is due for a cpe, as well.     Thanks!     Allyn Cavazos RN

## 2020-12-09 RX ORDER — MELOXICAM 7.5 MG/1
7.5 TABLET ORAL DAILY
Qty: 30 TABLET | Refills: 0 | Status: SHIPPED | OUTPATIENT
Start: 2020-12-09 | End: 2021-08-09

## 2020-12-16 DIAGNOSIS — H35.353 CYSTOID MACULAR EDEMA, BOTH EYES: Primary | ICD-10-CM

## 2021-01-27 ENCOUNTER — OFFICE VISIT (OUTPATIENT)
Dept: OPHTHALMOLOGY | Facility: CLINIC | Age: 66
End: 2021-01-27
Attending: OPHTHALMOLOGY
Payer: COMMERCIAL

## 2021-01-27 DIAGNOSIS — H35.353 CYSTOID MACULAR EDEMA, BOTH EYES: ICD-10-CM

## 2021-01-27 PROCEDURE — 92015 DETERMINE REFRACTIVE STATE: CPT

## 2021-01-27 PROCEDURE — 92012 INTRM OPH EXAM EST PATIENT: CPT | Mod: GC | Performed by: OPHTHALMOLOGY

## 2021-01-27 PROCEDURE — G0463 HOSPITAL OUTPT CLINIC VISIT: HCPCS

## 2021-01-27 PROCEDURE — 92134 CPTRZ OPH DX IMG PST SGM RTA: CPT | Performed by: OPHTHALMOLOGY

## 2021-01-27 ASSESSMENT — REFRACTION_MANIFEST
OS_CYLINDER: +0.75
OS_SPHERE: -0.75
OS_AXIS: 150
OD_CYLINDER: +0.50
OD_SPHERE: -1.75
OD_AXIS: 015
OD_ADD: +2.50
OS_ADD: +2.50

## 2021-01-27 ASSESSMENT — EXTERNAL EXAM - LEFT EYE: OS_EXAM: NORMAL

## 2021-01-27 ASSESSMENT — EXTERNAL EXAM - RIGHT EYE: OD_EXAM: NORMAL

## 2021-01-27 ASSESSMENT — REFRACTION_WEARINGRX
OS_SPHERE: -0.75
OD_SPHERE: -1.50
OS_ADD: +2.50
OD_AXIS: 024
OD_ADD: +2.50
OS_CYLINDER: +0.75
SPECS_TYPE: PAL
OS_AXIS: 150
OD_CYLINDER: +0.25

## 2021-01-27 ASSESSMENT — SLIT LAMP EXAM - LIDS
COMMENTS: NORMAL
COMMENTS: NORMAL

## 2021-01-27 ASSESSMENT — CONF VISUAL FIELD
METHOD: COUNTING FINGERS
OD_NORMAL: 1
OS_NORMAL: 1

## 2021-01-27 ASSESSMENT — VISUAL ACUITY
OD_CC: 20/30
METHOD: SNELLEN - LINEAR
CORRECTION_TYPE: GLASSES
OD_CC+: +1
OS_CC: 20/25

## 2021-01-27 ASSESSMENT — TONOMETRY
OD_IOP_MMHG: 14
IOP_METHOD: TONOPEN
OS_IOP_MMHG: 14

## 2021-01-27 ASSESSMENT — CUP TO DISC RATIO
OS_RATIO: 0.15
OD_RATIO: 0.25

## 2021-01-27 NOTE — NURSING NOTE
Chief Complaints and History of Present Illnesses   Patient presents with     Cystoid Macular Edema Follow Up     4 month follow up      Chief Complaint(s) and History of Present Illness(es)     Cystoid Macular Edema Follow Up     Comments: 4 month follow up               Comments     Pt states vision in RE is slightly cloudy today. Distortion in RE appears better.  Pt still seeing halos around lights with RE, but no changes.  No eye pain today.  No flashes or floaters.    ANA Noyola January 27, 2021 8:44 AM

## 2021-01-27 NOTE — PROGRESS NOTES
CC: ERM and post op CME  HPI: Valorie Hahn is a  64 year old year-old patient with history of cme after cataract surgery in each eye. Complaining today of waviness in vision right eye.     Interval History:  Left eye vision is perfect.  Right eye is a bit cloudy but not too bothered at this time. Not taking eye drops.      OCULAR HISTORY  CEIOL 3/8/2019 OD, 3/1/2019 OS - Dr. Garcia   -Postop CME each eye, OD > OS  S/p YAG cap OD 4/23/20    Retinal Imaging:  OCT 9-23-20  OD - 1+ ERM with irregular foveal contour; trace CME - better c/t 8/5/2020.   OS - VMA. Retina normal - stable    Assessment & Plan:       1. Recurrent CME, OD   -Onset post CEIOL   - patient mildly symptomatic even with 20/20 vision   - no CME off gtt since dec.2020     2. H/o CME, OS   -Onset post-CEIOL   -No IRF today   -Monitor    3. ERM, OD   -VA 20/25-3; improves with pinhole   -Defer surgery due to good vision    4. VMA, OS   -Monitor    5. Pseudophakia, OU   -Dr. Garcia - OD 3/8/2019, OS 3/1/2019    -S/p YAG cap OD 4/2020   -Clear visual axis    return in 6 mo     Jung Turcios MD  Vitreoretinal Surgery Fellow  [facility]     ~~~~~~~~~~~~~~~~~~~~~~~~~~~~~~~~~~   Complete documentation of historical and exam elements from today's encounter can be found in the full encounter summary report (not reduplicated in this progress note).  I personally obtained the chief complaint(s) and history of present illness.  I confirmed and edited as necessary the review of systems, past medical/surgical history, family history, social history, and examination findings as documented by others; and I examined the patient myself.  I personally reviewed the relevant tests, images, and reports as documented above.  I personally reviewed the ophthalmic test(s) associated with this encounter, agree with the interpretation(s) as documented by the resident/fellow, and have edited the corresponding report(s) as necessary.   I formulated and  edited as necessary the assessment and plan and discussed the findings and management plan with the patient and family    Priay Tabares MD   of Ophthalmology.  Retina Service   Department of Ophthalmology and Visual Neurosciences   [facility]  Phone: [phone]   Fax: [phone]     .

## 2021-01-28 ASSESSMENT — ENCOUNTER SYMPTOMS
CHILLS: 0
FEVER: 0
WEAKNESS: 0
COUGH: 1
HEARTBURN: 0
PARESTHESIAS: 0
HEADACHES: 0
ABDOMINAL PAIN: 0
DIARRHEA: 0
ARTHRALGIAS: 0
NAUSEA: 0
SHORTNESS OF BREATH: 1
CONSTIPATION: 0
FREQUENCY: 0
JOINT SWELLING: 0
EYE PAIN: 0
BREAST MASS: 0
PALPITATIONS: 0
SORE THROAT: 0
HEMATURIA: 0
DIZZINESS: 0
NERVOUS/ANXIOUS: 0
HEMATOCHEZIA: 0
DYSURIA: 0
MYALGIAS: 0

## 2021-01-29 ENCOUNTER — OFFICE VISIT (OUTPATIENT)
Dept: FAMILY MEDICINE | Facility: CLINIC | Age: 66
End: 2021-01-29
Payer: COMMERCIAL

## 2021-01-29 VITALS
TEMPERATURE: 97.3 F | OXYGEN SATURATION: 99 % | DIASTOLIC BLOOD PRESSURE: 78 MMHG | SYSTOLIC BLOOD PRESSURE: 125 MMHG | WEIGHT: 177 LBS | HEART RATE: 62 BPM | HEIGHT: 63 IN | BODY MASS INDEX: 31.36 KG/M2

## 2021-01-29 DIAGNOSIS — Z00.00 ROUTINE GENERAL MEDICAL EXAMINATION AT A HEALTH CARE FACILITY: Primary | ICD-10-CM

## 2021-01-29 DIAGNOSIS — J45.20 MILD INTERMITTENT ASTHMA WITHOUT COMPLICATION: ICD-10-CM

## 2021-01-29 DIAGNOSIS — Z86.16 HISTORY OF 2019 NOVEL CORONAVIRUS DISEASE (COVID-19): ICD-10-CM

## 2021-01-29 DIAGNOSIS — Z23 NEED FOR PROPHYLACTIC VACCINATION AND INOCULATION AGAINST INFLUENZA: ICD-10-CM

## 2021-01-29 LAB
CHOLEST SERPL-MCNC: 242 MG/DL
HDLC SERPL-MCNC: 84 MG/DL
LDLC SERPL CALC-MCNC: 139 MG/DL
NONHDLC SERPL-MCNC: 158 MG/DL
TRIGL SERPL-MCNC: 105 MG/DL

## 2021-01-29 PROCEDURE — 90682 RIV4 VACC RECOMBINANT DNA IM: CPT | Performed by: FAMILY MEDICINE

## 2021-01-29 PROCEDURE — 99213 OFFICE O/P EST LOW 20 MIN: CPT | Mod: 25 | Performed by: FAMILY MEDICINE

## 2021-01-29 PROCEDURE — 90472 IMMUNIZATION ADMIN EACH ADD: CPT | Performed by: FAMILY MEDICINE

## 2021-01-29 PROCEDURE — 90750 HZV VACC RECOMBINANT IM: CPT | Performed by: FAMILY MEDICINE

## 2021-01-29 PROCEDURE — 99396 PREV VISIT EST AGE 40-64: CPT | Mod: 25 | Performed by: FAMILY MEDICINE

## 2021-01-29 PROCEDURE — 90471 IMMUNIZATION ADMIN: CPT | Performed by: FAMILY MEDICINE

## 2021-01-29 PROCEDURE — 80061 LIPID PANEL: CPT | Performed by: FAMILY MEDICINE

## 2021-01-29 PROCEDURE — 36415 COLL VENOUS BLD VENIPUNCTURE: CPT | Performed by: FAMILY MEDICINE

## 2021-01-29 RX ORDER — ALBUTEROL SULFATE 90 UG/1
2 AEROSOL, METERED RESPIRATORY (INHALATION) EVERY 6 HOURS PRN
Qty: 1 INHALER | Refills: 0 | Status: SHIPPED | OUTPATIENT
Start: 2021-01-29 | End: 2021-08-09

## 2021-01-29 RX ORDER — FLUOROMETHOLONE 0.1 %
SUSPENSION, DROPS(FINAL DOSAGE FORM)(ML) OPHTHALMIC (EYE)
COMMUNITY
Start: 2020-09-24 | End: 2021-01-29

## 2021-01-29 RX ORDER — PREDNISOLONE ACETATE 10 MG/ML
SUSPENSION/ DROPS OPHTHALMIC
COMMUNITY
Start: 2020-08-06 | End: 2021-01-29

## 2021-01-29 RX ORDER — BUDESONIDE AND FORMOTEROL FUMARATE DIHYDRATE 80; 4.5 UG/1; UG/1
2 AEROSOL RESPIRATORY (INHALATION) 2 TIMES DAILY PRN
Qty: 10.2 G | Refills: 1 | Status: SHIPPED | OUTPATIENT
Start: 2021-01-29 | End: 2022-01-06

## 2021-01-29 ASSESSMENT — ENCOUNTER SYMPTOMS
HEADACHES: 0
BREAST MASS: 0
HEMATURIA: 0
NERVOUS/ANXIOUS: 0
FREQUENCY: 0
JOINT SWELLING: 0
HEMATOCHEZIA: 0
DIZZINESS: 0
CONSTIPATION: 0
PALPITATIONS: 0
NAUSEA: 0
DIARRHEA: 0
ABDOMINAL PAIN: 0
EYE PAIN: 0
CHILLS: 0
DYSURIA: 0
COUGH: 1
ARTHRALGIAS: 0
SHORTNESS OF BREATH: 1
HEARTBURN: 0
WEAKNESS: 0
FEVER: 0
PARESTHESIAS: 0
SORE THROAT: 0
MYALGIAS: 0

## 2021-01-29 ASSESSMENT — MIFFLIN-ST. JEOR: SCORE: 1323.58

## 2021-01-29 NOTE — PROGRESS NOTES
SUBJECTIVE:   CC: Valorie Hahn is an 65 year old woman who presents for preventive health visit.       Patient has been advised of split billing requirements and indicates understanding: Yes  Healthy Habits:     Getting at least 3 servings of Calcium per day:  Yes    Bi-annual eye exam:  Yes    Dental care twice a year:  Yes    Sleep apnea or symptoms of sleep apnea:  None    Diet:  Vegetarian/vegan and Breakfast skipped    Frequency of exercise:  4-5 days/week    Duration of exercise:  45-60 minutes    Taking medications regularly:  Yes    Barriers to taking medications:  Not applicable    Medication side effects:  Not applicable    PHQ-2 Total Score: 0    Additional concerns today:  No      PROBLEMS TO ADD ON...    Had COVID in May    Since that time has had more shortness of breath with exercise    Heart fluttering as well    Wonders about long term reaction to COVID?    Would like further eval.    Also - has asthma.  Always been mild  Lately been worse; needs inhaler.  Hasn't wanted to be on daily controller and too expensive.    Today's PHQ-2 Score:   PHQ-2 (  [employer]) 2021   Q1: Little interest or pleasure in doing things 0   Q2: Feeling down, depressed or hopeless 0   PHQ-2 Score 0   Q1: Little interest or pleasure in doing things Not at all   Q2: Feeling down, depressed or hopeless Not at all   PHQ-2 Score 0       Abuse: Current or Past (Physical, Sexual or Emotional) - No  Do you feel safe in your environment? Yes        Social History     Tobacco Use     Smoking status: Former Smoker     Packs/day: 0.50     Years: 10.00     Pack years: 5.00     Types: Cigarettes     Start date: 1990     Quit date: 2000     Years since quittin.6     Smokeless tobacco: Never Used   Substance Use Topics     Alcohol use: Yes     Comment: 1-2 glasses of wine per night     If you drink alcohol do you typically have >3 drinks per day or >7 drinks per week? No    Alcohol Use 2021   Prescreen:  >3 drinks/day or >7 drinks/week? No   Prescreen: >3 drinks/day or >7 drinks/week? -         Reviewed orders with patient.  Reviewed health maintenance and updated orders accordingly -       Breast CA Risk Screening:  Breast CA Risk Assessment (FHS-7) 1/28/2021   Do you have a family history of breast, colon, or ovarian cancer? No / Unknown         Mammogram Screening: Recommended mammography every 1-2 years with patient discussion and risk factor consideration  Pertinent mammograms are reviewed under the imaging tab.    History of abnormal Pap smear: NO - age 30-65 PAP every 5 years with negative HPV co-testing recommended  PAP / HPV Latest Ref Rng & Units 8/10/2016 5/31/2011 6/3/2008   PAP - OTHER-NIL, See Result NIL OTHER-NIL EM>40   HPV 16 DNA NEG Negative - -   HPV 18 DNA NEG Negative - -   OTHER HR HPV NEG Negative - -     Reviewed and updated as needed this visit by clinical staff  Tobacco  Allergies  Meds   Med Hx  Surg Hx  Fam Hx  Soc Hx        Reviewed and updated as needed this visit by Provider                    Review of Systems   Constitutional: Negative for chills and fever.   HENT: Negative for congestion, ear pain, hearing loss and sore throat.    Eyes: Negative for pain and visual disturbance.   Respiratory: Positive for cough and shortness of breath.    Cardiovascular: Negative for chest pain, palpitations and peripheral edema.   Gastrointestinal: Negative for abdominal pain, constipation, diarrhea, heartburn, hematochezia and nausea.   Breasts:  Negative for tenderness, breast mass and discharge.   Genitourinary: Negative for dysuria, frequency, genital sores, hematuria, pelvic pain, urgency, vaginal bleeding and vaginal discharge.   Musculoskeletal: Negative for arthralgias, joint swelling and myalgias.   Skin: Negative for rash.   Neurological: Negative for dizziness, weakness, headaches and paresthesias.   Psychiatric/Behavioral: Negative for mood changes. The patient is not  nervous/anxious.           OBJECTIVE:   LMP 02/14/2010   Physical Exam  GENERAL: healthy, alert and no distress  EYES: Eyes grossly normal to inspection, PERRL and conjunctivae and sclerae normal  HENT: ear canals and TM's normal, nose and mouth without ulcers or lesions  NECK: no adenopathy, no asymmetry, masses, or scars and thyroid normal to palpation  RESP: lungs clear to auscultation - no rales, rhonchi or wheezes  CV: regular rate and rhythm, normal S1 S2, no S3 or S4, no murmur, click or rub, no peripheral edema and peripheral pulses strong  ABDOMEN: soft, nontender, no hepatosplenomegaly, no masses and bowel sounds normal  MS: no gross musculoskeletal defects noted, no edema  SKIN: no suspicious lesions or rashes  NEURO: Normal strength and tone, mentation intact and speech normal  PSYCH: mentation appears normal, affect normal/bright    Diagnostic Test Results:  Labs reviewed in Epic    ASSESSMENT/PLAN:   Valorie was seen today for physical.    Diagnoses and all orders for this visit:    Routine general medical examination at a health care facility    up to date on mammo and colonoscopy, pap    Needs flu and shingrix; will do today.  -     Lipid panel reflex to direct LDL Non-fasting    Mild intermittent asthma without complication  History of 2019 novel coronavirus disease (COVID-19)  Comments:    ACT 20 today.    Usually rare use albuterol.    Now since COVID-19 worsened.    Will refill albuterol for prn use, and discussed recent LAMAR trial recommendations for ICS-LABA combo as prn rescue inhaler.    Will fill that as well - she'll see if she can get reasonably priced and try that instead of possible.    Additionally, given COVID infection will refer to post-covid clinic for pulmonology/cardiology eval.    Discussed with patient, all questions answered, in agreement with this plan, will return or seek further care if not improving or worsening.  Orders:  -     ADULT POST COVID CLINIC REFERRAL; Future  -  "    budesonide-formoterol (SYMBICORT) 80-4.5 MCG/ACT Inhaler; Inhale 2 puffs into the lungs 2 times daily as needed (wheeze, cough)  -     albuterol (PROAIR HFA/PROVENTIL HFA/VENTOLIN HFA) 108 (90 Base) MCG/ACT inhaler; Inhale 2 puffs into the lungs every 6 hours as needed for shortness of breath / dyspnea or wheezing    Other orders  -     ZOSTER VACCINE RECOMBINANT ADJUVANTED IM NJX        Patient has been advised of split billing requirements and indicates understanding: Yes  COUNSELING:  Reviewed preventive health counseling, as reflected in patient instructions    Estimated body mass index is 31.41 kg/m  as calculated from the following:    Height as of 12/10/19: 1.626 m (5' 4\").    Weight as of 12/10/19: 83 kg (183 lb).    Weight management plan: Discussed healthy diet and exercise guidelines    She reports that she quit smoking about 20 years ago. Her smoking use included cigarettes. She started smoking about 31 years ago. She has a 5.00 pack-year smoking history. She has never used smokeless tobacco.      Counseling Resources:  ATP IV Guidelines  Pooled Cohorts Equation Calculator  Breast Cancer Risk Calculator  BRCA-Related Cancer Risk Assessment: FHS-7 Tool  FRAX Risk Assessment  ICSI Preventive Guidelines  Dietary Guidelines for Americans, 2010  USDA's MyPlate  ASA Prophylaxis  Lung CA Screening    Anjali Kapadia MD  [facility]  "

## 2021-01-29 NOTE — NURSING NOTE
Prior to immunization administration, verified patients identity using patient s name and date of birth. Please see Immunization Activity for additional information.     Screening Questionnaire for Adult Immunization    Are you sick today?   No   Do you have allergies to medications, food, a vaccine component or latex?   Yes-penicillin   Have you ever had a serious reaction after receiving a vaccination?   No   Do you have a long-term health problem with heart, lung, kidney, or metabolic disease (e.g., diabetes), asthma, a blood disorder, no spleen, complement component deficiency, a cochlear implant, or a spinal fluid leak?  Are you on long-term aspirin therapy?   No   Do you have cancer, leukemia, HIV/AIDS, or any other immune system problem?   No   Do you have a parent, brother, or sister with an immune system problem?   No   In the past 3 months, have you taken medications that affect  your immune system, such as prednisone, other steroids, or anticancer drugs; drugs for the treatment of rheumatoid arthritis, Crohn s disease, or psoriasis; or have you had radiation treatments?   No   Have you had a seizure, or a brain or other nervous system problem?   No   During the past year, have you received a transfusion of blood or blood    products, or been given immune (gamma) globulin or antiviral drug?   No   For women: Are you pregnant or is there a chance you could become       pregnant during the next month?   No   Have you received any vaccinations in the past 4 weeks?   No     Immunization questionnaire was positive for at least one answer.  Notified Pollo Mckeon.        Per orders of Dr. Kapadia, injection of Flu and Shingles  given by Kulwant Garces. Patient instructed to remain in clinic for 15 minutes afterwards, and to report any adverse reaction to me immediately.      Due to injection administration, patient instructed to remain in clinic for 15 minutes  afterwards, and to report any adverse reaction to me  immediately.       Screening performed by Kulwant Garces on 1/29/2021 at 2:49 PM.

## 2021-01-30 ASSESSMENT — ASTHMA QUESTIONNAIRES: ACT_TOTALSCORE: 20

## 2021-01-31 NOTE — RESULT ENCOUNTER NOTE
Darin Eugene,  It was nice to meet you in clinic last week.  Your labs are back and show a mildly elevated bad LDL cholesterol, but a good HDL or heart protective cholesterol.  Continue exercise and heart healthy Mediterranean-style diet rich in fish, olive oil, whole grains, vegetables and low in animal fats and processed foods.  [email or web address]    Specifically:  -LDL(bad) cholesterol level is elevated which can increase your heart disease risk.  A diet high in fat and simple carbohydrates, genetics and being overweight can contribute to this. ADVISE: exercising 150 minutes of aerobic exercise per week (30 minutes for 5 days per week or 50 minutes for 3 days per week are options) and eating a low saturated fat/low carbohydrate diet are helpful to improve this. We should recheck this next year.    For additional lab test information, labtestsonline.org is an excellent reference.  Please let me know if you have any further questions or concerns.  Sincerely,  Dr. Anjali Kapadia MD    Thank you for choosing the [facility] as your health care provider. Please don't hesitate to call with any questions or concerns [phone].    The 10-year ASCVD risk score (Avocapablo KOCH Jr., et al., 2013) is: 4.4%    Values used to calculate the score:      Age: 65 years      Sex: Female      Is Non- : No      Diabetic: No      Tobacco smoker: No      Systolic Blood Pressure: 125 mmHg      Is BP treated: No      HDL Cholesterol: 84 mg/dL      Total Cholesterol: 242 mg/dL

## 2021-02-03 ENCOUNTER — VIRTUAL VISIT (OUTPATIENT)
Dept: PHYSICAL MEDICINE AND REHAB | Facility: CLINIC | Age: 66
End: 2021-02-03
Attending: FAMILY MEDICINE
Payer: COMMERCIAL

## 2021-02-03 DIAGNOSIS — Z86.16 HISTORY OF 2019 NOVEL CORONAVIRUS DISEASE (COVID-19): ICD-10-CM

## 2021-02-03 DIAGNOSIS — R06.09 DYSPNEA ON EXERTION: Primary | ICD-10-CM

## 2021-02-03 DIAGNOSIS — Z86.16 PERSONAL HISTORY OF COVID-19: ICD-10-CM

## 2021-02-03 DIAGNOSIS — R09.82 POSTNASAL DRIP: ICD-10-CM

## 2021-02-03 DIAGNOSIS — J45.20 MILD INTERMITTENT ASTHMA WITHOUT COMPLICATION: ICD-10-CM

## 2021-02-03 PROCEDURE — 99215 OFFICE O/P EST HI 40 MIN: CPT | Mod: 95 | Performed by: INTERNAL MEDICINE

## 2021-02-03 RX ORDER — FLUTICASONE PROPIONATE 50 MCG
2 SPRAY, SUSPENSION (ML) NASAL DAILY
Qty: 16 G | Refills: 4 | Status: SHIPPED | OUTPATIENT
Start: 2021-02-03 | End: 2022-01-06

## 2021-02-03 ASSESSMENT — ENCOUNTER SYMPTOMS
ENDOCRINE NEGATIVE: 1
HEMATOLOGIC/LYMPHATIC NEGATIVE: 1
NAUSEA: 0
COUGH: 1
FEVER: 0
PSYCHIATRIC NEGATIVE: 1
SINUS PAIN: 0
DYSURIA: 0
ALLERGIC/IMMUNOLOGIC NEGATIVE: 1
DIZZINESS: 0
DIARRHEA: 0
PALPITATIONS: 0
FATIGUE: 0
RHINORRHEA: 1
SHORTNESS OF BREATH: 1

## 2021-02-03 ASSESSMENT — ANXIETY QUESTIONNAIRES
7. FEELING AFRAID AS IF SOMETHING AWFUL MIGHT HAPPEN: NOT AT ALL
5. BEING SO RESTLESS THAT IT IS HARD TO SIT STILL: NOT AT ALL
1. FEELING NERVOUS, ANXIOUS, OR ON EDGE: NOT AT ALL
3. WORRYING TOO MUCH ABOUT DIFFERENT THINGS: NOT AT ALL
2. NOT BEING ABLE TO STOP OR CONTROL WORRYING: NOT AT ALL
GAD7 TOTAL SCORE: 0
6. BECOMING EASILY ANNOYED OR IRRITABLE: NOT AT ALL

## 2021-02-03 ASSESSMENT — PATIENT HEALTH QUESTIONNAIRE - PHQ9
SUM OF ALL RESPONSES TO PHQ QUESTIONS 1-9: 1
5. POOR APPETITE OR OVEREATING: NOT AT ALL

## 2021-02-03 NOTE — PROGRESS NOTES
Valorie is a 65 year old who is being evaluated via a billable video visit.        Video Start Time: 9:40 AM  Assessment & Plan     Mild intermittent asthma without complication  Patient reports history of mild intermittent asthma. See additional recommendations below.   - ADULT POST COVID CLINIC REFERRAL    History of 2019 novel coronavirus disease (COVID-19)    - ADULT POST COVID CLINIC REFERRAL    Dyspnea on exertion  Discussed causes of shortness of breath following COVID-19. Recommend further evaluation with PFT, 6 minute walk and chest CT given duration of symptoms over 6 months. Referral to Pulmonary Clinic was also placed.   - General PFT Lab (Please always keep checked); Future  - 6 minute walk test; Future  - Pulmonary Function Test; Future  - CT Chest Pulmonary Embolism w Contrast; Future  - TSH with free T4 reflex; Future  - CBC with Platelets Differential; Future  - PULMONARY MEDICINE REFERRAL    Personal history of covid-19  Recommend screening for vitamin D deficiency.   - 25- OH-Vitamin D; Future  - Comprehensive metabolic panel; Future    Postnasal drip  Patient was advised on symptoms, recommend starting steroid spray. Administration technique was discussed with patient. Referral to Otolaryngology was also placed.   - fluticasone (FLONASE) 50 MCG/ACT nasal spray; Spray 2 sprays into both nostrils daily  - OTOLARYNGOLOGY REFERRAL              60 minutes spent on the date of the encounter doing chart review, history and exam, documentation and further activities as noted above               Return in about 3 months (around 5/3/2021).    Sheron Gunter MD  [facility] PHYSICAL MEDICINE AND REHABILITATION CLINIC [place]    Emily Eugene is a 65 year old who presents to clinic today for the following health issues     HPI       Patient reports that she had COVID in May of 2020. She reports that she had lot of issues with hair loss. She also reports dyspnea on exertion. She  also reports chronic cough, possibly worse than usual.   Patient reports that her illness has started with muscle aches and muscle cramping. She was tested and was found positive. She reports that her oxygen saturation was dropping to the lower 90 - upper 80 (was as low as 87%). Over the next 10 days she has started to recover. It took about 2 weeks to start feeling better. She was doing some breathing exercises. She was using her albuterol inhaler, also was was using moist heat vaporizer.ot tried a Symbicort (has a prescription).   Patient also reports postnasal drip that started 6-8 weeks ago. She has been sleeping on 3 pillows because of it. She states that  She has nshe has tried cetirizine with good effect, however, has significant eye dryness with the medication. She also has been using neti pot.     Review of Systems   Constitutional: Negative for fatigue and fever.   HENT: Positive for postnasal drip and rhinorrhea. Negative for ear discharge and sinus pain.    Respiratory: Positive for cough and shortness of breath.    Cardiovascular: Negative for palpitations and peripheral edema.   Gastrointestinal: Negative for diarrhea and nausea.   Endocrine: Negative.    Breasts:  negative.    Genitourinary: Negative for dysuria.   Allergic/Immunologic: Negative.    Neurological: Negative for dizziness.   Hematological: Negative.    Psychiatric/Behavioral: Negative.       Past Medical History:   Diagnosis Date     Arthritis 2012    SI joint, osteoarthritis     Complication of anesthesia      Esophageal reflux      Genital herpes, unspecified      Kidney stone 09/2019     Mild intermittent asthma with exacerbation      PONV (postoperative nausea and vomiting)      Past Surgical History:   Procedure Laterality Date     C APPENDECTOMY       CATARACT IOL, RT/LT       COLONOSCOPY      some polyps removed     COLONOSCOPY WITH CO2 INSUFFLATION N/A 1/3/2019    Procedure: COLONOSCOPY WITH CO2 INSUFFLATION;  Surgeon: Vitor  Arnold CONDE MD;  [facility]     EXCISE MASS FINGER Right 2019    Procedure: RIght ring  finger mass excision;  Surgeon: Karel Valle MD;  [facility]     HC TOOTH EXTRACTION W/FORCEP       PHACOEMULSIFICATION WITH STANDARD INTRAOCULAR LENS IMPLANT Left 3/1/2019    Procedure: Left Eye Cataract Removal with Intraocular Lens Implant;  Surgeon: Danna Garcia MD;  [facility]     PHACOEMULSIFICATION WITH STANDARD INTRAOCULAR LENS IMPLANT Right 3/8/2019    Procedure: Right Eye Cataract Removal with Intraocular Lens Implant;  Surgeon: Danna Garcia MD;  [facility]     ZZC NONSPECIFIC PROCEDURE      Anal fissure repair     Family History   Problem Relation Age of Onset     Respiratory Mother         emphysema     Mental Illness Mother      Thyroid Disease Mother      Thyroid Disease Mother         hypothyroid, takes supplement     Cerebrovascular Disease Mother      Depression Mother      Mental Illness Mother      Glaucoma Mother      Mental Illness Maternal Grandmother      Depression Maternal Grandmother      Diabetes Father         type 2     Heart Disease Father         bypass surgeries x 2     Other Cancer Father         non hodgkins     Skin Cancer Father      Thyroid Disease Sister         hypothyroid     Thyroid Disease Sister         hypothyroid     Psychotic Disorder Sister         commited suicide, depression     Cancer Brother         esophogeal cancer, work exposure to chemicals     Blood Disease Brother          hiv  aids     Blood Disease Brother         hep c     Blood Disease Brother         hiv positive, passed away     Family History Negative Brother      Diabetes Brother      Mental Illness Sister      Thyroid Disease Sister      Thyroid Disease Sister      Diabetes Brother         type 2     Depression Sister      Depression Sister      Depression Brother      Melanoma No family hx of      Macular Degeneration No family hx of      Social History      Socioeconomic History     Marital status: Single     Spouse name: Not on file     Number of children: 0     Years of education: Not on file     Highest education level: Not on file   Occupational History     Occupation:      Employer: SELF   Social Needs     Financial resource strain: Not on file     Food insecurity     Worry: Not on file     Inability: Not on file     Transportation needs     Medical: Not on file     Non-medical: Not on file   Tobacco Use     Smoking status: Former Smoker     Packs/day: 0.50     Years: 10.00     Pack years: 5.00     Types: Cigarettes     Start date: 1990     Quit date: 2000     Years since quittin.7     Smokeless tobacco: Never Used   Substance and Sexual Activity     Alcohol use: Yes     Comment: 1-2 glasses of wine per night     Drug use: No     Sexual activity: Not Currently     Partners: Female   Lifestyle     Physical activity     Days per week: Not on file     Minutes per session: Not on file     Stress: Not on file   Relationships     Social connections     Talks on phone: Not on file     Gets together: Not on file     Attends Catholic service: Not on file     Active member of club or organization: Not on file     Attends meetings of clubs or organizations: Not on file     Relationship status: Not on file     Intimate partner violence     Fear of current or ex partner: Not on file     Emotionally abused: Not on file     Physically abused: Not on file     Forced sexual activity: Not on file   Other Topics Concern      Service No     Blood Transfusions Yes     Caffeine Concern No     Occupational Exposure No     Hobby Hazards No     Sleep Concern No     Stress Concern No     Weight Concern Yes     Comment: gained 30 pounds in last 3 to 4 years     Special Diet No     Back Care Yes     Comment: pain in recent years     Exercise Yes     Comment: gym and bike riding     Bike Helmet Yes     Seat Belt Yes     Self-Exams Yes      Parent/sibling w/ CABG, MI or angioplasty before 65F 55M? No     Comment: Father, aged late 40's   Social History Narrative    Balanced Diet - Yes    Osteoporosis Prevention Measures - Dairy servings per day: 2 servings    Regular Exercise -  Yes Describe walks daily for 1 hour    Dental Exam - YES - Date: 6 months ago    Eye Exam - YES - Date: 5-09    Self Breast Exam - No    Abuse: Current or Past (Physical, Sexual or Emotional)- No    Do you feel safe in your environment - Yes    Guns stored in the home - No    Sunscreen used - Yes    Seatbelts used - Yes    Lipids -  YES - Date: 5-05    Glucose -  YES - Date: 8-05    Colon Cancer Screening - Colonoscopy 12-07(date completed)    Hemoccults -     Pap Test -  YES - Date: 6-08    Do you have any concerns about STD's -  No    Mammography - YES - Date: 9-08    DEXA - NO    Immunizations reviewed and up to date - Yes, td 7-05 6-11-99 BETTE Rodriguez [facility]         Current Outpatient Medications   Medication     fluticasone (FLONASE) 50 MCG/ACT nasal spray     acyclovir (ZOVIRAX) 400 MG tablet     albuterol (PROAIR HFA/PROVENTIL HFA/VENTOLIN HFA) 108 (90 Base) MCG/ACT inhaler     budesonide-formoterol (SYMBICORT) 80-4.5 MCG/ACT Inhaler     meloxicam (MOBIC) 7.5 MG tablet     No current facility-administered medications for this visit.              Objective    Vitals - Patient Reported  Pain Score: No Pain (0)        Physical Exam   GENERAL: Healthy, alert and no distress  RESP: No audible wheeze, cough, or visible cyanosis.  No visible retractions or increased work of breathing.    SKIN: Visible skin clear. No significant rash, abnormal pigmentation or lesions.  NEURO: Cranial nerves grossly intact.  Mentation and speech appropriate for age.  PSYCH: Mentation appears normal, affect normal/bright, judgement and insight intact, normal speech and appearance well-groomed.                Video-Visit Details    Type of service:  Video Visit    Video End Time:10:00  AM    Originating Location (pt. Location): Home    Distant Location (provider location):  [facility] PHYSICAL MEDICINE AND REHABILITATION CLINIC [place]     Platform used for Video Visit: Skye

## 2021-02-03 NOTE — LETTER
2/3/2021       RE: Valorie Hahn  [address]  [place] [address]     Dear Colleague,    Thank you for referring your patient, Valorie Hahn, to the [facility] PHYSICAL MEDICINE AND REHABILITATION CLINIC [place] at [facility]. Please see a copy of my visit note below.    Valorie is a 65 year old who is being evaluated via a billable video visit.      How would you like to obtain your AVS? MyChart  If the video visit is dropped, the invitation should be resent by: Text to cell phone: [phone]  Will anyone else be joining your video visit? No          Valorie is a 65 year old who is being evaluated via a billable video visit.        Video Start Time: 9:40 AM  Assessment & Plan     Mild intermittent asthma without complication  Patient reports history of mild intermittent asthma. See additional recommendations below.   - ADULT POST COVID CLINIC REFERRAL    History of 2019 novel coronavirus disease (COVID-19)    - ADULT POST COVID CLINIC REFERRAL    Dyspnea on exertion  Discussed causes of shortness of breath following COVID-19. Recommend further evaluation with PFT, 6 minute walk and chest CT given duration of symptoms over 6 months. Referral to Pulmonary Clinic was also placed.   - General PFT Lab (Please always keep checked); Future  - 6 minute walk test; Future  - Pulmonary Function Test; Future  - CT Chest Pulmonary Embolism w Contrast; Future  - TSH with free T4 reflex; Future  - CBC with Platelets Differential; Future  - PULMONARY MEDICINE REFERRAL    Personal history of covid-19  Recommend screening for vitamin D deficiency.   - 25- OH-Vitamin D; Future  - Comprehensive metabolic panel; Future    Postnasal drip  Patient was advised on symptoms, recommend starting steroid spray. Administration technique was discussed with patient. Referral to Otolaryngology was also placed.   - fluticasone (FLONASE) 50 MCG/ACT nasal spray; Spray  2 sprays into both nostrils daily  - OTOLARYNGOLOGY REFERRAL              60 minutes spent on the date of the encounter doing chart review, history and exam, documentation and further activities as noted above               Return in about 3 months (around 5/3/2021).    Sheron Gunter MD  [facility] PHYSICAL MEDICINE AND REHABILITATION CLINIC SHEREEN Eugene is a 65 year old who presents to clinic today for the following health issues     HPI       Patient reports that she had COVID in May of 2020. She reports that she had lot of issues with hair loss. She also reports dyspnea on exertion. She also reports chronic cough, possibly worse than usual.   Patient reports that her illness has started with muscle aches and muscle cramping. She was tested and was found positive. She reports that her oxygen saturation was dropping to the lower 90 - upper 80 (was as low as 87%). Over the next 10 days she has started to recover. It took about 2 weeks to start feeling better. She was doing some breathing exercises. She was using her albuterol inhaler, also was was using moist heat vaporizer.ot tried a Symbicort (has a prescription).   Patient also reports postnasal drip that started 6-8 weeks ago. She has been sleeping on 3 pillows because of it. She states that  She has nshe has tried cetirizine with good effect, however, has significant eye dryness with the medication. She also has been using neti pot.     Review of Systems   Constitutional: Negative for fatigue and fever.   HENT: Positive for postnasal drip and rhinorrhea. Negative for ear discharge and sinus pain.    Respiratory: Positive for cough and shortness of breath.    Cardiovascular: Negative for palpitations and peripheral edema.   Gastrointestinal: Negative for diarrhea and nausea.   Endocrine: Negative.    Breasts:  negative.    Genitourinary: Negative for dysuria.   Allergic/Immunologic: Negative.    Neurological: Negative for  dizziness.   Hematological: Negative.    Psychiatric/Behavioral: Negative.       Past Medical History:   Diagnosis Date     Arthritis 2012    SI joint, osteoarthritis     Complication of anesthesia      Esophageal reflux      Genital herpes, unspecified      Kidney stone 09/2019     Mild intermittent asthma with exacerbation      PONV (postoperative nausea and vomiting)      Past Surgical History:   Procedure Laterality Date     C APPENDECTOMY       CATARACT IOL, RT/LT       COLONOSCOPY      some polyps removed     COLONOSCOPY WITH CO2 INSUFFLATION N/A 1/3/2019    Procedure: COLONOSCOPY WITH CO2 INSUFFLATION;  Surgeon: Arnold Adler MD;  [facility]     EXCISE MASS FINGER Right 12/12/2019    Procedure: RIght ring  finger mass excision;  Surgeon: Karel Valle MD;  [facility]     HC TOOTH EXTRACTION W/FORCEP       PHACOEMULSIFICATION WITH STANDARD INTRAOCULAR LENS IMPLANT Left 3/1/2019    Procedure: Left Eye Cataract Removal with Intraocular Lens Implant;  Surgeon: Danna Garcia MD;  [facility]     PHACOEMULSIFICATION WITH STANDARD INTRAOCULAR LENS IMPLANT Right 3/8/2019    Procedure: Right Eye Cataract Removal with Intraocular Lens Implant;  Surgeon: Danna Garcia MD;  [facility]     ZZC NONSPECIFIC PROCEDURE      Anal fissure repair     Family History   Problem Relation Age of Onset     Respiratory Mother         emphysema     Mental Illness Mother      Thyroid Disease Mother      Thyroid Disease Mother         hypothyroid, takes supplement     Cerebrovascular Disease Mother      Depression Mother      Mental Illness Mother      Glaucoma Mother      Mental Illness Maternal Grandmother      Depression Maternal Grandmother      Diabetes Father         type 2     Heart Disease Father         bypass surgeries x 2     Other Cancer Father         non hodgkins     Skin Cancer Father      Thyroid Disease Sister         hypothyroid     Thyroid Disease Sister          hypothyroid     Psychotic Disorder Sister         commited suicide, depression     Cancer Brother         esophogeal cancer, work exposure to chemicals     Blood Disease Brother          hiv  aids     Blood Disease Brother         hep c     Blood Disease Brother         hiv positive, passed away     Family History Negative Brother      Diabetes Brother      Mental Illness Sister      Thyroid Disease Sister      Thyroid Disease Sister      Diabetes Brother         type 2     Depression Sister      Depression Sister      Depression Brother      Melanoma No family hx of      Macular Degeneration No family hx of      Social History     Socioeconomic History     Marital status: Single     Spouse name: Not on file     Number of children: 0     Years of education: Not on file     Highest education level: Not on file   Occupational History     Occupation:      Employer: SELF   Social Needs     Financial resource strain: Not on file     Food insecurity     Worry: Not on file     Inability: Not on file     Transportation needs     Medical: Not on file     Non-medical: Not on file   Tobacco Use     Smoking status: Former Smoker     Packs/day: 0.50     Years: 10.00     Pack years: 5.00     Types: Cigarettes     Start date: 1990     Quit date: 2000     Years since quittin.7     Smokeless tobacco: Never Used   Substance and Sexual Activity     Alcohol use: Yes     Comment: 1-2 glasses of wine per night     Drug use: No     Sexual activity: Not Currently     Partners: Female   Lifestyle     Physical activity     Days per week: Not on file     Minutes per session: Not on file     Stress: Not on file   Relationships     Social connections     Talks on phone: Not on file     Gets together: Not on file     Attends Confucianism service: Not on file     Active member of club or organization: Not on file     Attends meetings of clubs or organizations: Not on file     Relationship status: Not on file      Intimate partner violence     Fear of current or ex partner: Not on file     Emotionally abused: Not on file     Physically abused: Not on file     Forced sexual activity: Not on file   Other Topics Concern      Service No     Blood Transfusions Yes     Caffeine Concern No     Occupational Exposure No     Hobby Hazards No     Sleep Concern No     Stress Concern No     Weight Concern Yes     Comment: gained 30 pounds in last 3 to 4 years     Special Diet No     Back Care Yes     Comment: pain in recent years     Exercise Yes     Comment: gym and bike riding     Bike Helmet Yes     Seat Belt Yes     Self-Exams Yes     Parent/sibling w/ CABG, MI or angioplasty before 65F 55M? No     Comment: Father, aged late 40's   Social History Narrative    Balanced Diet - Yes    Osteoporosis Prevention Measures - Dairy servings per day: 2 servings    Regular Exercise -  Yes Describe walks daily for 1 hour    Dental Exam - YES - Date: 6 months ago    Eye Exam - YES - Date: 5-09    Self Breast Exam - No    Abuse: Current or Past (Physical, Sexual or Emotional)- No    Do you feel safe in your environment - Yes    Guns stored in the home - No    Sunscreen used - Yes    Seatbelts used - Yes    Lipids -  YES - Date: 5-05    Glucose -  YES - Date: 8-05    Colon Cancer Screening - Colonoscopy 12-07(date completed)    Hemoccults -     Pap Test -  YES - Date: 6-08    Do you have any concerns about STD's -  No    Mammography - YES - Date: 9-08    DEXA - NO    Immunizations reviewed and up to date - Yes, td 7-05 6-11-99 BETTE Rodriguez CMA         Current Outpatient Medications   Medication     fluticasone (FLONASE) 50 MCG/ACT nasal spray     acyclovir (ZOVIRAX) 400 MG tablet     albuterol (PROAIR HFA/PROVENTIL HFA/VENTOLIN HFA) 108 (90 Base) MCG/ACT inhaler     budesonide-formoterol (SYMBICORT) 80-4.5 MCG/ACT Inhaler     meloxicam (MOBIC) 7.5 MG tablet     No current facility-administered medications for this visit.               Objective    Vitals - Patient Reported  Pain Score: No Pain (0)        Physical Exam   GENERAL: Healthy, alert and no distress  RESP: No audible wheeze, cough, or visible cyanosis.  No visible retractions or increased work of breathing.    SKIN: Visible skin clear. No significant rash, abnormal pigmentation or lesions.  NEURO: Cranial nerves grossly intact.  Mentation and speech appropriate for age.  PSYCH: Mentation appears normal, affect normal/bright, judgement and insight intact, normal speech and appearance well-groomed.                Video-Visit Details    Type of service:  Video Visit    Video End Time:10:00 AM    Originating Location (pt. Location): Home    Distant Location (provider location):  [facility] PHYSICAL MEDICINE AND REHABILITATION CLINIC [place]     Platform used for Video Visit: Elieimdarrell      Again, thank you for allowing me to participate in the care of your patient.      Sincerely,    Sheron Gunter MD

## 2021-02-03 NOTE — PROGRESS NOTES
Valorie is a 65 year old who is being evaluated via a billable video visit.      How would you like to obtain your AVS? MyChart  If the video visit is dropped, the invitation should be resent by: Text to cell phone: [phone]  Will anyone else be joining your video visit? No

## 2021-02-04 ENCOUNTER — TELEPHONE (OUTPATIENT)
Dept: OTOLARYNGOLOGY | Facility: CLINIC | Age: 66
End: 2021-02-04

## 2021-02-04 ASSESSMENT — ANXIETY QUESTIONNAIRES: GAD7 TOTAL SCORE: 0

## 2021-02-04 NOTE — TELEPHONE ENCOUNTER
LVM for patient to schedule next available appt with a laryngologist (Dr. Bocanegra, Dr. Fernandez, or Dr. Hamlin) per referral from Dr. Gunter.    Left call center number for scheduling.

## 2021-02-06 ENCOUNTER — ANCILLARY PROCEDURE (OUTPATIENT)
Dept: CT IMAGING | Facility: CLINIC | Age: 66
End: 2021-02-06
Attending: INTERNAL MEDICINE
Payer: COMMERCIAL

## 2021-02-06 DIAGNOSIS — R06.09 DYSPNEA ON EXERTION: ICD-10-CM

## 2021-02-06 PROCEDURE — 71275 CT ANGIOGRAPHY CHEST: CPT | Performed by: RADIOLOGY

## 2021-02-06 RX ORDER — IOPAMIDOL 755 MG/ML
100 INJECTION, SOLUTION INTRAVASCULAR ONCE
Status: COMPLETED | OUTPATIENT
Start: 2021-02-06 | End: 2021-02-06

## 2021-02-06 RX ADMIN — IOPAMIDOL 62 ML: 755 INJECTION, SOLUTION INTRAVASCULAR at 12:58

## 2021-02-17 NOTE — TELEPHONE ENCOUNTER
RECORDS RECEIVED FROM: Internal    DATE RECEIVED: 3.17.21   NOTES STATUS DETAILS   OFFICE NOTE from referring provider Internal  Sheron Gunter   OFFICE NOTE from other specialist Internal  5.20.20 Joseph     DISCHARGE SUMMARY from hospital na    DISCHARGE REPORT from the ER na    MEDICATION LIST Internal     IMAGING  (NEED IMAGES AND REPORTS)     CT SCAN Internal  2.6.21    CHEST XRAY (CXR) na    TESTS     PULMONARY FUNCTION TESTING (PFT) Internal  Scheduled 3.17.21

## 2021-02-22 ENCOUNTER — TELEPHONE (OUTPATIENT)
Dept: PHYSICAL MEDICINE AND REHAB | Facility: CLINIC | Age: 66
End: 2021-02-22

## 2021-02-22 DIAGNOSIS — R06.09 DYSPNEA ON EXERTION: Primary | ICD-10-CM

## 2021-02-22 NOTE — TELEPHONE ENCOUNTER
Echocardiogram is recommended. Discussed with patient today, she is in agreement with the plan.   Sheron Gunter MD

## 2021-02-25 ENCOUNTER — ANCILLARY PROCEDURE (OUTPATIENT)
Dept: CARDIOLOGY | Facility: CLINIC | Age: 66
End: 2021-02-25
Attending: INTERNAL MEDICINE
Payer: COMMERCIAL

## 2021-02-25 DIAGNOSIS — R06.09 DYSPNEA ON EXERTION: ICD-10-CM

## 2021-02-25 PROCEDURE — 93306 TTE W/DOPPLER COMPLETE: CPT | Performed by: INTERNAL MEDICINE

## 2021-03-17 ENCOUNTER — OFFICE VISIT (OUTPATIENT)
Dept: PULMONOLOGY | Facility: CLINIC | Age: 66
End: 2021-03-17
Attending: INTERNAL MEDICINE
Payer: COMMERCIAL

## 2021-03-17 ENCOUNTER — PRE VISIT (OUTPATIENT)
Dept: PULMONOLOGY | Facility: CLINIC | Age: 66
End: 2021-03-17

## 2021-03-17 VITALS
SYSTOLIC BLOOD PRESSURE: 112 MMHG | WEIGHT: 179 LBS | BODY MASS INDEX: 31.61 KG/M2 | HEART RATE: 74 BPM | RESPIRATION RATE: 18 BRPM | DIASTOLIC BLOOD PRESSURE: 76 MMHG | OXYGEN SATURATION: 96 %

## 2021-03-17 DIAGNOSIS — J45.20 MILD INTERMITTENT ASTHMA, UNSPECIFIED WHETHER COMPLICATED: Primary | ICD-10-CM

## 2021-03-17 DIAGNOSIS — R06.09 DYSPNEA ON EXERTION: ICD-10-CM

## 2021-03-17 PROCEDURE — 94729 DIFFUSING CAPACITY: CPT | Performed by: INTERNAL MEDICINE

## 2021-03-17 PROCEDURE — 99204 OFFICE O/P NEW MOD 45 MIN: CPT | Mod: GC | Performed by: INTERNAL MEDICINE

## 2021-03-17 PROCEDURE — 94726 PLETHYSMOGRAPHY LUNG VOLUMES: CPT | Performed by: INTERNAL MEDICINE

## 2021-03-17 PROCEDURE — G0463 HOSPITAL OUTPT CLINIC VISIT: HCPCS | Mod: 25

## 2021-03-17 PROCEDURE — 94375 RESPIRATORY FLOW VOLUME LOOP: CPT | Performed by: INTERNAL MEDICINE

## 2021-03-17 RX ORDER — LEVALBUTEROL TARTRATE 45 UG/1
2 AEROSOL, METERED ORAL EVERY 4 HOURS PRN
Qty: 1 G | Refills: 11 | Status: SHIPPED | OUTPATIENT
Start: 2021-03-17 | End: 2022-05-11

## 2021-03-17 ASSESSMENT — ENCOUNTER SYMPTOMS
WHEEZING: 0
SHORTNESS OF BREATH: 0
COUGH DISTURBING SLEEP: 1
SNORES LOUDLY: 0
HEMOPTYSIS: 0
COUGH: 1
SPUTUM PRODUCTION: 1
DYSPNEA ON EXERTION: 1
POSTURAL DYSPNEA: 0

## 2021-03-17 ASSESSMENT — PAIN SCALES - GENERAL: PAINLEVEL: NO PAIN (0)

## 2021-03-17 NOTE — PROGRESS NOTES
"Pulmonary Clinic Initial Visit Note    CC: \"dyspnea\"       HPI:   65F with PMHx of asthma, GERD, COVID-19 in , former smoker, who is presenting for evaluation of dyspnea. Patient states she has known asthma and has been taking only KARYN for the past 6 months. She had mild COVID-19 infection back in . Since then she feels her breathing has been off. Her main concern is not able to catch her breath when she is riding her bike or performing other work-outs. She was previously prescribed Symbicort, however discontinued due to cost. She denies any recent fevers, chills, cough, increase sputum, wheezing, chest tightness, arthralgias or myalgias. OF note she had a chest CT-PE back in  which showed no PE or parenchymal disease.        PMH:  Past Medical History:   Diagnosis Date     Arthritis     SI joint, osteoarthritis     Complication of anesthesia      Esophageal reflux      Genital herpes, unspecified      Kidney stone 2019     Mild intermittent asthma with exacerbation      PONV (postoperative nausea and vomiting)        Allergies:  Allergies   Allergen Reactions     Penicillin G Hives       Social History:  Social History     Socioeconomic History     Marital status: Single     Spouse name: Not on file     Number of children: 0     Years of education: Not on file     Highest education level: Not on file   Occupational History     Occupation:      Employer: SELF   Social Needs     Financial resource strain: Not on file     Food insecurity     Worry: Not on file     Inability: Not on file     Transportation needs     Medical: Not on file     Non-medical: Not on file   Tobacco Use     Smoking status: Former Smoker     Packs/day: 0.50     Years: 10.00     Pack years: 5.00     Types: Cigarettes     Start date: 1990     Quit date: 2000     Years since quittin.8     Smokeless tobacco: Never Used   Substance and Sexual Activity     Alcohol use: Yes     Comment: 1-2 glasses of " wine per night     Drug use: No     Sexual activity: Not Currently     Partners: Female   Lifestyle     Physical activity     Days per week: Not on file     Minutes per session: Not on file     Stress: Not on file   Relationships     Social connections     Talks on phone: Not on file     Gets together: Not on file     Attends Voodoo service: Not on file     Active member of club or organization: Not on file     Attends meetings of clubs or organizations: Not on file     Relationship status: Not on file     Intimate partner violence     Fear of current or ex partner: Not on file     Emotionally abused: Not on file     Physically abused: Not on file     Forced sexual activity: Not on file   Other Topics Concern      Service No     Blood Transfusions Yes     Caffeine Concern No     Occupational Exposure No     Hobby Hazards No     Sleep Concern No     Stress Concern No     Weight Concern Yes     Comment: gained 30 pounds in last 3 to 4 years     Special Diet No     Back Care Yes     Comment: pain in recent years     Exercise Yes     Comment: gym and bike riding     Bike Helmet Yes     Seat Belt Yes     Self-Exams Yes     Parent/sibling w/ CABG, MI or angioplasty before 65F 55M? No     Comment: Father, aged late 40's   Social History Narrative    Balanced Diet - Yes    Osteoporosis Prevention Measures - Dairy servings per day: 2 servings    Regular Exercise -  Yes Describe walks daily for 1 hour    Dental Exam - YES - Date: 6 months ago    Eye Exam - YES - Date: 5-09    Self Breast Exam - No    Abuse: Current or Past (Physical, Sexual or Emotional)- No    Do you feel safe in your environment - Yes    Guns stored in the home - No    Sunscreen used - Yes    Seatbelts used - Yes    Lipids -  YES - Date: 5-05    Glucose -  YES - Date: 8-05    Colon Cancer Screening - Colonoscopy 12-07(date completed)    Hemoccults -     Pap Test -  YES - Date: 6-08    Do you have any concerns about STD's -  No    Mammography -  YES - Date:     DEXA - NO    Immunizations reviewed and up to date - Yes, td 7-99 BETTE Rodriguez CMA           Medications:  Current Outpatient Medications   Medication Sig Dispense Refill     acyclovir (ZOVIRAX) 400 MG tablet Take 1 tablet (400 mg) by mouth 2 times daily For 3 days with each outbreak. 6 tablet 1     albuterol (PROAIR HFA/PROVENTIL HFA/VENTOLIN HFA) 108 (90 Base) MCG/ACT inhaler Inhale 2 puffs into the lungs every 6 hours as needed for shortness of breath / dyspnea or wheezing 1 Inhaler 0     budesonide-formoterol (SYMBICORT) 80-4.5 MCG/ACT Inhaler Inhale 2 puffs into the lungs 2 times daily as needed (wheeze, cough) 10.2 g 1     fluticasone (FLONASE) 50 MCG/ACT nasal spray Spray 2 sprays into both nostrils daily 16 g 4     meloxicam (MOBIC) 7.5 MG tablet Take 1 tablet (7.5 mg) by mouth daily APPOINTMENT REQUIRED PRIOR TO ADD'L REFILLS. 30 tablet 0       Family History:  Family History   Problem Relation Age of Onset     Respiratory Mother         emphysema     Mental Illness Mother      Thyroid Disease Mother      Thyroid Disease Mother         hypothyroid, takes supplement     Cerebrovascular Disease Mother      Depression Mother      Mental Illness Mother      Glaucoma Mother      Mental Illness Maternal Grandmother      Depression Maternal Grandmother      Diabetes Father         type 2     Heart Disease Father         bypass surgeries x 2     Other Cancer Father         non hodgkins     Skin Cancer Father      Thyroid Disease Sister         hypothyroid     Thyroid Disease Sister         hypothyroid     Psychotic Disorder Sister         commited suicide, depression     Cancer Brother         esophogeal cancer, work exposure to chemicals     Blood Disease Brother          hiv  aids     Blood Disease Brother         hep c     Blood Disease Brother         hiv positive, passed away     Family History Negative Brother      Diabetes Brother      Mental Illness Sister      Thyroid  Disease Sister      Thyroid Disease Sister      Diabetes Brother         type 2     Depression Sister      Depression Sister      Depression Brother      Melanoma No family hx of      Macular Degeneration No family hx of        ROS: Complete 10 point ROS negative unless mentioned in HPI    Physical Exam:  LMP 02/14/2010     General: Sitting in the chair in NAD  HEENT: anicteric, moist mucosa  Neck: no palpable lymphadenopathy, no JVD noted  Chest: CTAB, no wheezing  Cardiac: RRR no murmurs  Abdomen: Soft, flat, non tender, active BS  Extremities: No LE Edema  Neuro: A&Ox3, no focal defecits  Skin: no rash noted        Labs and Radiology:  CT-PE 2/6/21  1. Exam is negative for acute pulmonary embolism.    Evidence for right heart strain or increased right heart pressures?   Is present.  2. Prominent right atrium and right ventricles without pulmonary  embolism. Central pulmonary artery is also normal caliber. Borderline  enlargement of the right atrium and right ventricle were present on CT  in 2004. Measurement for right heart strain is borderline. There is  however reflux into the hepatic veins. Cardiac echo in 2007 suggested  that the right atrium and right ventricle were of normal size.    PFT's:  PFT Latest Ref Rng & Units 3/17/2021   FVC L 2.71   FEV1 L 2.37   FVC% % 92   FEV1% % 102         Assessment and Plan:  65F with PMHx of asthma, GERD, COVID-19 in 5/20, former smoker, who is presenting for evaluation of dyspnea. Patient had recent CT-CHEST which showed no parenchymal abnormalities. Her PFT's today are within normal range. Her clinic history fits with mild intermittent asthma. Would continue her on KARYN, switch to Xopenex given that she complained of tachycardia. She does not want to use LABA/ICS combination due to cost of inhalers. Given her current mild disease, would hold additional inhalers for now. If her sxs worsen would add ICS/LABA.   Follow up in 6 months.     # Mild intermittent asthma     Plan    - Xopenex PRN   - Follow up in 6 months       Seen and staffed with Micah Weeks MD  Pulmonary and Critical Care Fellow      I saw and evaluated patient with Fellow.  Case discussed - agree with note.  I reviewed chest CT: no parenchymal lung disease.  I reviewed PFT: normal spirometry, lung volumes and diffusion.  Consider ICS/LABA in future in symptoms worsne.  DAVIN SIMPSON M.D.              Answers for HPI/ROS submitted by the patient on 3/17/2021   General Symptoms: No  Skin Symptoms: No  HENT Symptoms: No  EYE SYMPTOMS: No  HEART SYMPTOMS: No  LUNG SYMPTOMS: Yes  INTESTINAL SYMPTOMS: No  URINARY SYMPTOMS: No  GYNECOLOGIC SYMPTOMS: No  BREAST SYMPTOMS: No  SKELETAL SYMPTOMS: No  BLOOD SYMPTOMS: No  NERVOUS SYSTEM SYMPTOMS: No  MENTAL HEALTH SYMPTOMS: No  Cough: Yes  Sputum or phlegm: Yes  Coughing up blood: No  Difficulty breating or shortness of breath: No  Snoring: No  Wheezing: No  Difficulty breathing on exertion: Yes  Nighttime Cough: Yes  Difficulty breathing when lying flat: No

## 2021-03-17 NOTE — NURSING NOTE
Chief Complaint   Patient presents with     Consult     Dyspnea on exertion     Medications reviewed and updated.  Vitals taken  Sanjuanita Kenny CMA

## 2021-03-17 NOTE — LETTER
"    3/17/2021         RE: Valorie Hahn  [address]  [facility] [address]        Dear Colleague,    Thank you for referring your patient, Valorie Hahn, to the [facility] [place]. Please see a copy of my visit note below.    Pulmonary Clinic Initial Visit Note    CC: \"dyspnea\"       HPI:   65F with PMHx of asthma, GERD, COVID-19 in 5/20, former smoker, who is presenting for evaluation of dyspnea. Patient states she has known asthma and has been taking only KARYN for the past 6 months. She had mild COVID-19 infection back in 5/20. Since then she feels her breathing has been off. Her main concern is not able to catch her breath when she is riding her bike or performing other work-outs. She was previously prescribed Symbicort, however discontinued due to cost. She denies any recent fevers, chills, cough, increase sputum, wheezing, chest tightness, arthralgias or myalgias. OF note she had a chest CT-PE back in 2/21 which showed no PE or parenchymal disease.        PMH:  Past Medical History:   Diagnosis Date     Arthritis 2012    SI joint, osteoarthritis     Complication of anesthesia      Esophageal reflux      Genital herpes, unspecified      Kidney stone 09/2019     Mild intermittent asthma with exacerbation      PONV (postoperative nausea and vomiting)        Allergies:  Allergies   Allergen Reactions     Penicillin G Hives       Social History:  Social History     Socioeconomic History     Marital status: Single     Spouse name: Not on file     Number of children: 0     Years of education: Not on file     Highest education level: Not on file   Occupational History     Occupation:      Employer: SELF   Social Needs     Financial resource strain: Not on file     Food insecurity     Worry: Not on file     Inability: Not on file     Transportation needs     Medical: Not on file     Non-medical: Not on file   Tobacco Use "     Smoking status: Former Smoker     Packs/day: 0.50     Years: 10.00     Pack years: 5.00     Types: Cigarettes     Start date: 1990     Quit date: 2000     Years since quittin.8     Smokeless tobacco: Never Used   Substance and Sexual Activity     Alcohol use: Yes     Comment: 1-2 glasses of wine per night     Drug use: No     Sexual activity: Not Currently     Partners: Female   Lifestyle     Physical activity     Days per week: Not on file     Minutes per session: Not on file     Stress: Not on file   Relationships     Social connections     Talks on phone: Not on file     Gets together: Not on file     Attends Taoist service: Not on file     Active member of club or organization: Not on file     Attends meetings of clubs or organizations: Not on file     Relationship status: Not on file     Intimate partner violence     Fear of current or ex partner: Not on file     Emotionally abused: Not on file     Physically abused: Not on file     Forced sexual activity: Not on file   Other Topics Concern      Service No     Blood Transfusions Yes     Caffeine Concern No     Occupational Exposure No     Hobby Hazards No     Sleep Concern No     Stress Concern No     Weight Concern Yes     Comment: gained 30 pounds in last 3 to 4 years     Special Diet No     Back Care Yes     Comment: pain in recent years     Exercise Yes     Comment: gym and bike riding     Bike Helmet Yes     Seat Belt Yes     Self-Exams Yes     Parent/sibling w/ CABG, MI or angioplasty before 65F 55M? No     Comment: Father, aged late 40's   Social History Narrative    Balanced Diet - Yes    Osteoporosis Prevention Measures - Dairy servings per day: 2 servings    Regular Exercise -  Yes Describe walks daily for 1 hour    Dental Exam - YES - Date: 6 months ago    Eye Exam - YES - Date:     Self Breast Exam - No    Abuse: Current or Past (Physical, Sexual or Emotional)- No    Do you feel safe in your environment - Yes    Guns  stored in the home - No    Sunscreen used - Yes    Seatbelts used - Yes    Lipids -  YES - Date: 5-05    Glucose -  YES - Date: 8-05    Colon Cancer Screening - Colonoscopy 12-07(date completed)    Hemoccults -     Pap Test -  YES - Date: 6-08    Do you have any concerns about STD's -  No    Mammography - YES - Date: 9-08    DEXA - NO    Immunizations reviewed and up to date - Yes, td 7-05 6-11-99 BETTE Rodriguez [facility]           Medications:  Current Outpatient Medications   Medication Sig Dispense Refill     acyclovir (ZOVIRAX) 400 MG tablet Take 1 tablet (400 mg) by mouth 2 times daily For 3 days with each outbreak. 6 tablet 1     albuterol (PROAIR HFA/PROVENTIL HFA/VENTOLIN HFA) 108 (90 Base) MCG/ACT inhaler Inhale 2 puffs into the lungs every 6 hours as needed for shortness of breath / dyspnea or wheezing 1 Inhaler 0     budesonide-formoterol (SYMBICORT) 80-4.5 MCG/ACT Inhaler Inhale 2 puffs into the lungs 2 times daily as needed (wheeze, cough) 10.2 g 1     fluticasone (FLONASE) 50 MCG/ACT nasal spray Spray 2 sprays into both nostrils daily 16 g 4     meloxicam (MOBIC) 7.5 MG tablet Take 1 tablet (7.5 mg) by mouth daily APPOINTMENT REQUIRED PRIOR TO ADD'L REFILLS. 30 tablet 0       Family History:  Family History   Problem Relation Age of Onset     Respiratory Mother         emphysema     Mental Illness Mother      Thyroid Disease Mother      Thyroid Disease Mother         hypothyroid, takes supplement     Cerebrovascular Disease Mother      Depression Mother      Mental Illness Mother      Glaucoma Mother      Mental Illness Maternal Grandmother      Depression Maternal Grandmother      Diabetes Father         type 2     Heart Disease Father         bypass surgeries x 2     Other Cancer Father         non hodgkins     Skin Cancer Father      Thyroid Disease Sister         hypothyroid     Thyroid Disease Sister         hypothyroid     Psychotic Disorder Sister         commited suicide, depression     Cancer Brother          esophogeal cancer, work exposure to chemicals     Blood Disease Brother          hiv  aids     Blood Disease Brother         hep c     Blood Disease Brother         hiv positive, passed away     Family History Negative Brother      Diabetes Brother      Mental Illness Sister      Thyroid Disease Sister      Thyroid Disease Sister      Diabetes Brother         type 2     Depression Sister      Depression Sister      Depression Brother      Melanoma No family hx of      Macular Degeneration No family hx of        ROS: Complete 10 point ROS negative unless mentioned in HPI    Physical Exam:  LMP 2010     General: Sitting in the chair in NAD  HEENT: anicteric, moist mucosa  Neck: no palpable lymphadenopathy, no JVD noted  Chest: CTAB, no wheezing  Cardiac: RRR no murmurs  Abdomen: Soft, flat, non tender, active BS  Extremities: No LE Edema  Neuro: A&Ox3, no focal defecits  Skin: no rash noted        Labs and Radiology:  CT-PE 21  1. Exam is negative for acute pulmonary embolism.    Evidence for right heart strain or increased right heart pressures?   Is present.  2. Prominent right atrium and right ventricles without pulmonary  embolism. Central pulmonary artery is also normal caliber. Borderline  enlargement of the right atrium and right ventricle were present on CT  in . Measurement for right heart strain is borderline. There is  however reflux into the hepatic veins. Cardiac echo in  suggested  that the right atrium and right ventricle were of normal size.    PFT's:  PFT Latest Ref Rng & Units 3/17/2021   FVC L 2.71   FEV1 L 2.37   FVC% % 92   FEV1% % 102         Assessment and Plan:  65F with PMHx of asthma, GERD, COVID-19 in , former smoker, who is presenting for evaluation of dyspnea. Patient had recent CT-CHEST which showed no parenchymal abnormalities. Her PFT's today are within normal range. Her clinic history fits with mild intermittent asthma. Would continue her on KARYN,  switch to Xopenex given that she complained of tachycardia. She does not want to use LABA/ICS combination due to cost of inhalers. Given her current mild disease, would hold additional inhalers for now. If her sxs worsen would add ICS/LABA.   Follow up in 6 months.     # Mild intermittent asthma     Plan   - Xopenex PRN   - Follow up in 6 months       Seen and staffed with Micah Weeks MD  Pulmonary and Critical Care Fellow      I saw and evaluated patient with Fellow.  Case discussed - agree with note.  I reviewed chest CT: no parenchymal lung disease.  I reviewed PFT: normal spirometry, lung volumes and diffusion.  Consider ICS/LABA in future in symptoms worsne.  DAVIN SIMPSON M.D.              Answers for HPI/ROS submitted by the patient on 3/17/2021   General Symptoms: No  Skin Symptoms: No  HENT Symptoms: No  EYE SYMPTOMS: No  HEART SYMPTOMS: No  LUNG SYMPTOMS: Yes  INTESTINAL SYMPTOMS: No  URINARY SYMPTOMS: No  GYNECOLOGIC SYMPTOMS: No  BREAST SYMPTOMS: No  SKELETAL SYMPTOMS: No  BLOOD SYMPTOMS: No  NERVOUS SYSTEM SYMPTOMS: No  MENTAL HEALTH SYMPTOMS: No  Cough: Yes  Sputum or phlegm: Yes  Coughing up blood: No  Difficulty breating or shortness of breath: No  Snoring: No  Wheezing: No  Difficulty breathing on exertion: Yes  Nighttime Cough: Yes  Difficulty breathing when lying flat: No        Again, thank you for allowing me to participate in the care of your patient.        Sincerely,        Yi Obrien MD

## 2021-03-17 NOTE — PATIENT INSTRUCTIONS
We will give you a new rescue inhaler Xopenex.   If your breathing symptoms worsen please call the clinic to update.   Follow up 6 months.

## 2021-03-18 LAB
DLCOUNC-%PRED-PRE: 119 %
DLCOUNC-PRE: 22.94 ML/MIN/MMHG
DLCOUNC-PRED: 19.24 ML/MIN/MMHG
ERV-%PRED-PRE: 35 %
ERV-PRE: 0.17 L
ERV-PRED: 0.48 L
EXPTIME-PRE: 5.48 SEC
FEF2575-%PRED-PRE: 153 %
FEF2575-PRE: 3.15 L/SEC
FEF2575-PRED: 2.06 L/SEC
FEFMAX-%PRED-PRE: 111 %
FEFMAX-PRE: 6.61 L/SEC
FEFMAX-PRED: 5.91 L/SEC
FEV1-%PRED-PRE: 102 %
FEV1-PRE: 2.37 L
FEV1FEV6-PRE: 87 %
FEV1FEV6-PRED: 80 %
FEV1FVC-PRE: 87 %
FEV1FVC-PRED: 79 %
FEV1SVC-PRE: 82 %
FEV1SVC-PRED: 75 %
FIFMAX-PRE: 4.88 L/SEC
FRCPLETH-%PRED-PRE: 86 %
FRCPLETH-PRE: 2.28 L
FRCPLETH-PRED: 2.65 L
FVC-%PRED-PRE: 92 %
FVC-PRE: 2.71 L
FVC-PRED: 2.94 L
IC-%PRED-PRE: 105 %
IC-PRE: 2.73 L
IC-PRED: 2.58 L
RVPLETH-%PRED-PRE: 109 %
RVPLETH-PRE: 2.11 L
RVPLETH-PRED: 1.92 L
TLCPLETH-%PRED-PRE: 104 %
TLCPLETH-PRE: 5.01 L
TLCPLETH-PRED: 4.77 L
VA-%PRED-PRE: 96 %
VA-PRE: 4.4 L
VC-%PRED-PRE: 94 %
VC-PRE: 2.9 L
VC-PRED: 3.06 L

## 2021-03-26 ENCOUNTER — MYC MEDICAL ADVICE (OUTPATIENT)
Dept: FAMILY MEDICINE | Facility: CLINIC | Age: 66
End: 2021-03-26

## 2021-03-26 DIAGNOSIS — A60.04 HERPES SIMPLEX VULVOVAGINITIS: ICD-10-CM

## 2021-03-26 RX ORDER — ACYCLOVIR 400 MG/1
400 TABLET ORAL 2 TIMES DAILY
Qty: 6 TABLET | Refills: 1 | Status: SHIPPED | OUTPATIENT
Start: 2021-03-26 | End: 2021-09-20

## 2021-05-11 DIAGNOSIS — M53.3 SI (SACROILIAC) JOINT DYSFUNCTION: ICD-10-CM

## 2021-05-13 RX ORDER — MELOXICAM 7.5 MG/1
TABLET ORAL
Qty: 30 TABLET | Refills: 0 | OUTPATIENT
Start: 2021-05-13

## 2021-08-05 ENCOUNTER — MYC MEDICAL ADVICE (OUTPATIENT)
Dept: FAMILY MEDICINE | Facility: CLINIC | Age: 66
End: 2021-08-05

## 2021-08-09 ENCOUNTER — MYC REFILL (OUTPATIENT)
Dept: FAMILY MEDICINE | Facility: CLINIC | Age: 66
End: 2021-08-09

## 2021-08-09 DIAGNOSIS — J45.20 MILD INTERMITTENT ASTHMA WITHOUT COMPLICATION: ICD-10-CM

## 2021-08-10 RX ORDER — ALBUTEROL SULFATE 90 UG/1
2 AEROSOL, METERED RESPIRATORY (INHALATION) EVERY 6 HOURS PRN
Qty: 8 G | Refills: 0 | Status: SHIPPED | OUTPATIENT
Start: 2021-08-10 | End: 2023-10-24

## 2021-08-10 NOTE — TELEPHONE ENCOUNTER
Can you reach out to patient and let her know I filled 1 falguni inhaler,  But needs to establish care with new PCP for further refills?    Thanks,  Dr. Anjali Kapadia MD / [facility]

## 2021-09-01 ENCOUNTER — TELEPHONE (OUTPATIENT)
Dept: PULMONOLOGY | Facility: CLINIC | Age: 66
End: 2021-09-01

## 2021-09-01 NOTE — TELEPHONE ENCOUNTER
Spoke with pt about scheduling follow up appt in pulmonary, acknowledging that Dr. Obrien, who she has previously seen, is no longer with our clinic and we would set her up with new provider. Pt states she is going to be switching to medicare in a couple weeks and requesting waiting into coverage changes. She states she has our phone number to call when ready.

## 2021-09-05 ENCOUNTER — HEALTH MAINTENANCE LETTER (OUTPATIENT)
Age: 66
End: 2021-09-05

## 2021-09-16 ENCOUNTER — MYC MEDICAL ADVICE (OUTPATIENT)
Dept: FAMILY MEDICINE | Facility: CLINIC | Age: 66
End: 2021-09-16

## 2021-09-16 DIAGNOSIS — A60.04 HERPES SIMPLEX VULVOVAGINITIS: ICD-10-CM

## 2021-09-20 RX ORDER — ACYCLOVIR 400 MG/1
TABLET ORAL
Qty: 6 TABLET | Refills: 1 | Status: SHIPPED | OUTPATIENT
Start: 2021-09-20 | End: 2022-05-11

## 2021-09-20 RX ORDER — ACYCLOVIR 400 MG/1
400 TABLET ORAL 2 TIMES DAILY
Qty: 30 TABLET | Refills: 1 | Status: SHIPPED | OUTPATIENT
Start: 2021-09-20 | End: 2022-05-11

## 2021-09-20 NOTE — TELEPHONE ENCOUNTER
Routing refill request to provider for review/approval because:  --Labs not current:  Scr last drawn 9/4/19.            --Last visit:  1/29/2021 Kang for CPE.    --Future Visit:   Next 5 appointments (look out 90 days)    Oct 06, 2021 11:00 AM  Screening Mammogram with [id number]  [facility] ([facility] ) [address] [place] [address], [facility] [address]  [facility] [address] [phone]          Creatinine   Date Value Ref Range Status   09/04/2019 0.86 0.52 - 1.04 mg/dL Final   05/07/2019 0.76 0.52 - 1.04 mg/dL Final   03/29/2019 0.63 0.52 - 1.04 mg/dL Final   08/02/2017 0.60 0.52 - 1.04 mg/dL Final   06/16/2015 0.67 0.52 - 1.04 mg/dL Final

## 2021-10-11 ENCOUNTER — E-VISIT (OUTPATIENT)
Dept: URGENT CARE | Facility: URGENT CARE | Age: 66
End: 2021-10-11
Payer: COMMERCIAL

## 2021-10-11 DIAGNOSIS — B02.9 HERPES ZOSTER WITHOUT COMPLICATION: Primary | ICD-10-CM

## 2021-10-11 PROCEDURE — 99422 OL DIG E/M SVC 11-20 MIN: CPT | Performed by: PHYSICIAN ASSISTANT

## 2021-10-11 RX ORDER — VALACYCLOVIR HYDROCHLORIDE 1 G/1
1000 TABLET, FILM COATED ORAL 3 TIMES DAILY
Qty: 21 TABLET | Refills: 0 | Status: SHIPPED | OUTPATIENT
Start: 2021-10-11 | End: 2022-05-11

## 2021-10-11 NOTE — PATIENT INSTRUCTIONS
Dear Valorie Hahn,    Based on the history provided and picture, I have diagnosed you with shingles which is caused by a virus. Take medication that I sen to the pharmacy. To prevent the spread make sure you avoid direct contact with others. Keep rash covered if possible until rash has completely crusted over. Conservative measures discussed including over-the-counter analgesics (Tylenol or Ibuprofen every hours) and lidocaine patches. See your primary care provider if symptoms worsen or do not improve in 7 days. Seek emergency care if you develop severe pain/redness, shortness of breath, or confusion.       Thanks for choosing us as your health care partner,    Nicole Berrios PA-C    Patient Education     Shingles  Shingles is a viral infection caused by the same virus that causes chicken pox. Anyone who has had chicken pox may get shingles later in life. The virus stays in the body, but remains asleep (dormant). Shingles often occurs in older persons or persons with lowered immunity. But it can affect anyone at any age.  Shingles starts as a tingling patch of skin on one side of the body. Small, painful blisters may then appear. The rash rarely spreads to other parts of the body.  Exposure to shingles can't cause shingles. However, it can cause chicken pox in anyone who has not had chicken pox or has not been vaccinated. The contagious period ends when all blisters have crusted over, generally 1 to 2 weeks after the illness starts.  After the blisters heal, the affected skin may be sensitive or painful for weeks or months, gradually resolving over time. But, sometimes this can last longer and be permanent (called postherpetic neuralgia.)  Shingles vaccines are available. Vaccination can help prevent shingles or make it less painful. It is generally recommended for adults older than 50, even if you've had singles in the past. Talk with your healthcare provider about when to get vaccinated and which vaccine  is best for you.  Home care    Medicines may be prescribed to help relieve pain. Take these medicines as directed. Ask your healthcare provider or pharmacist before using over-the-counter medicines for helping treat pain and itching.    In certain cases, antiviral medicines may be prescribed to reduce pain, shorten the illness, and prevent neuralgia. Take these medicines as directed.    Compresses made from a solution of cool water mixed with cornstarch or baking soda may help relieve pain and itching.     Gently wash skin daily with soap and water to help prevent infection. Be certain to rinse off all of the soap, which can be irritating.    Trim fingernails and try not to scratch. Scratching the sores may leave scars.    Stay home from work or school until all blisters have formed a crust and you are no longer contagious.  Follow-up care  Follow up with your healthcare provider, or as directed.  When to seek medical advice    Fever of 100.4 F (38 C) or higher, or as directed by your healthcare provider    Affected skin is on the face or neck and any of the following occur:  ? Headache  ? Eye pain  ? Changes in vision  ? Sores near the eye  ? Weakness of facial muscles    Blisters occurring on new areas of the body    Pain, redness, or swelling of a joint    Signs of skin infection: colored drainage from the sores, warmth, increasing redness, fever, or increasing pain  Lucía last reviewed this educational content on 4/1/2018 2000-2021 [employer]. All rights reserved. This information is not intended as a substitute for professional medical care. Always follow your healthcare professional's instructions.

## 2021-11-04 ENCOUNTER — ANCILLARY PROCEDURE (OUTPATIENT)
Dept: MAMMOGRAPHY | Facility: CLINIC | Age: 66
End: 2021-11-04
Attending: NURSE PRACTITIONER
Payer: COMMERCIAL

## 2021-11-04 DIAGNOSIS — Z12.31 VISIT FOR SCREENING MAMMOGRAM: ICD-10-CM

## 2021-11-04 PROCEDURE — 77063 BREAST TOMOSYNTHESIS BI: CPT

## 2021-11-04 PROCEDURE — 77067 SCR MAMMO BI INCL CAD: CPT | Mod: 26 | Performed by: RADIOLOGY

## 2021-11-04 PROCEDURE — 77063 BREAST TOMOSYNTHESIS BI: CPT | Mod: 26 | Performed by: RADIOLOGY

## 2021-12-14 ENCOUNTER — MYC MEDICAL ADVICE (OUTPATIENT)
Dept: FAMILY MEDICINE | Facility: CLINIC | Age: 66
End: 2021-12-14
Payer: COMMERCIAL

## 2021-12-14 DIAGNOSIS — J32.9 RECURRENT SINUS INFECTIONS: Primary | ICD-10-CM

## 2021-12-28 NOTE — TELEPHONE ENCOUNTER
FUTURE VISIT INFORMATION      FUTURE VISIT INFORMATION:    Date: 2/23/2022    Time: 10AM    Location: [facility]  REFERRAL INFORMATION:    Referring provider:  Anjali Kapadia MD    Referring providers clinic:  [facility]     Reason for visit/diagnosis  Referred by Anjali Kapadia dx: Recurrent Sinus infection.     RECORDS REQUESTED FROM:       Clinic name Comments Records Status Imaging Status   [facility]  12/14/2021 referral  Epic

## 2022-01-03 ENCOUNTER — TELEPHONE (OUTPATIENT)
Dept: OPHTHALMOLOGY | Facility: CLINIC | Age: 67
End: 2022-01-03
Payer: COMMERCIAL

## 2022-01-03 NOTE — TELEPHONE ENCOUNTER
Called and LVM for Valorie to call me in regards to her flashes of light in the left eye and any other symptoms     Chela Cullen Communication Facilitator on 1/3/2022 at 11:04 AM

## 2022-01-03 NOTE — TELEPHONE ENCOUNTER
Valorie called me in regards to her symptoms.     Per Valorie her Flashes of light started on Sat 1/1     This is the first time Valorie has experienced Flashes of Light     Her Flashes of light happen interment      She has had 3-4 episodes     The flashes look like maryellen tree bulbs     The flashes are at the very left edge of the eyeball     No pain    No drainage     Able to read / watch tv     Made an appt with Dr. Tabares for 1/ 6 at 2:30 pm

## 2022-01-06 ENCOUNTER — OFFICE VISIT (OUTPATIENT)
Dept: OPHTHALMOLOGY | Facility: CLINIC | Age: 67
End: 2022-01-06
Attending: OPHTHALMOLOGY
Payer: COMMERCIAL

## 2022-01-06 DIAGNOSIS — H35.353 CYSTOID MACULAR EDEMA, BOTH EYES: ICD-10-CM

## 2022-01-06 DIAGNOSIS — H35.353 CYSTOID MACULAR EDEMA, BOTH EYES: Primary | ICD-10-CM

## 2022-01-06 PROCEDURE — 92134 CPTRZ OPH DX IMG PST SGM RTA: CPT | Performed by: OPHTHALMOLOGY

## 2022-01-06 PROCEDURE — 92012 INTRM OPH EXAM EST PATIENT: CPT | Performed by: OPHTHALMOLOGY

## 2022-01-06 PROCEDURE — 92250 FUNDUS PHOTOGRAPHY W/I&R: CPT | Performed by: OPHTHALMOLOGY

## 2022-01-06 PROCEDURE — 99207 FUNDUS PHOTOS OU (BOTH EYES): CPT | Mod: 26 | Performed by: OPHTHALMOLOGY

## 2022-01-06 PROCEDURE — G0463 HOSPITAL OUTPT CLINIC VISIT: HCPCS

## 2022-01-06 ASSESSMENT — CUP TO DISC RATIO
OD_RATIO: 0.25
OS_RATIO: 0.15

## 2022-01-06 ASSESSMENT — VISUAL ACUITY
METHOD: SNELLEN - LINEAR
OD_CC: 20/20
CORRECTION_TYPE: GLASSES
OD_CC+: -2
OS_CC: 20/25

## 2022-01-06 ASSESSMENT — EXTERNAL EXAM - LEFT EYE: OS_EXAM: NORMAL

## 2022-01-06 ASSESSMENT — CONF VISUAL FIELD
OS_NORMAL: 1
OD_NORMAL: 1
METHOD: COUNTING FINGERS

## 2022-01-06 ASSESSMENT — EXTERNAL EXAM - RIGHT EYE: OD_EXAM: NORMAL

## 2022-01-06 ASSESSMENT — TONOMETRY
OS_IOP_MMHG: 10
OD_IOP_MMHG: 10
IOP_METHOD: TONOPEN

## 2022-01-06 ASSESSMENT — SLIT LAMP EXAM - LIDS
COMMENTS: NORMAL
COMMENTS: NORMAL

## 2022-01-06 NOTE — PROGRESS NOTES
CC: ERM and post op CME  HPI: Valorie Hahn is a  66 year old year-old patient with history of cme after cataract surgery in each eye. Complaining today of waviness in vision right eye.     Interval History: Since 1/1/22, she has noticed intermittent small flashes of light in her left eye when she is in the dark. She notes small black squiggle floaters in the left eye, stable vs mildly worsened. Since onset, she notes flashes and floaters have decreased. No change in visual acuity, eye pain, or curtain.    OCULAR HISTORY  CEIOL 3/8/2019 OD, 3/1/2019 OS - Dr. Garcia   -Postop CME each eye, OD > OS  S/p YAG cap OD 4/23/20    Retinal Imaging:  OCT 01/06/22   OD - 1+ ERM with irregular foveal contour worsened, no CME  OS - Normal central retina, PHF detached     Assessment & Plan:        # Posterior vitreous detachment (PVD), left eye   - Onset 1/1/22   - No holes/tears/detachment on  360 (1/6/22)   - Retinal tear/detachment precautions given, to return to clinic if those occur   - Otherwise recheck with dilated fundus exam and  in 2 weeks    #. Recurrent CME, right eye, resolved   -Onset post CEIOL   - patient mildly symptomatic even with 20/20 vision   - no CME off gtt since dec.2020     #. H/o CME, left eye, resolved   -Onset post-CEIOL   -No IRF today   -Monitor    # ERM, OD   -VA 20/20, notes mild distortion   -Would like to defer surgery at this time    # Pseudophakia, OU   -Dr. Garcia - OD 3/8/2019, OS 3/1/2019    -S/p YAG cap OD 4/2020   -Clear visual axis    return to clinic: 4 weeks for dilated fundus exam, optos left eye     ~~~~~~~~~~~~~~~~~~~~~~~~~~~~~~~~~~   Complete documentation of historical and exam elements from today's encounter can be found in the full encounter summary report (not reduplicated in this progress note).  I personally obtained the chief complaint(s) and history of present illness.  I confirmed and edited as necessary the review of systems, past medical/surgical history,  family history, social history, and examination findings as documented by others; and I examined the patient myself.  I personally reviewed the relevant tests, images, and reports as documented above.  I formulated and edited as necessary the assessment and plan and discussed the findings and management plan with the patient and family    Priya Tabares MD   of Ophthalmology.  Retina Service   Department of Ophthalmology and Visual Neurosciences   [facility]  Phone: [phone]   Fax: [phone]     .

## 2022-01-06 NOTE — NURSING NOTE
Chief Complaints and History of Present Illnesses   Patient presents with     Flashes Left Eye     Chief Complaint(s) and History of Present Illness(es)     Flashes Left Eye     Quality: Like a string of white maryellen lights per pt    Duration: 4 days    Characteristics: intermittent    Course: gradually improving    Associated symptoms: Negative for floaters, shade, eye pain, photophobia, vomiting, nausea and dizziness    Pain scale: 0/10              Comments     Flashing lights in periphery of LE since Saturday. Noting that they are still intermittently appearing. LE has 'actually been intermittently sore, like an ache. Denies associated blurred vision.  States had stopped Restasis a few days ago in BE because wonders if there is a connection. Restasis was prescribed Nov 4th by [facility], can't recall physician.  Otherwise she was taking BID to BE / PFAT BID to TID to BE.  Courtney Fletcher, COT COT 2:50 PM 01/06/2022        Courtney Fletcher, COT COT 2:46 PM 01/06/2022

## 2022-02-23 ENCOUNTER — PRE VISIT (OUTPATIENT)
Dept: OTOLARYNGOLOGY | Facility: CLINIC | Age: 67
End: 2022-02-23

## 2022-04-07 DIAGNOSIS — H35.353 CYSTOID MACULAR EDEMA, BOTH EYES: Primary | ICD-10-CM

## 2022-04-17 ENCOUNTER — HEALTH MAINTENANCE LETTER (OUTPATIENT)
Age: 67
End: 2022-04-17

## 2022-04-25 NOTE — ADDENDUM NOTE
Addended by: MIKE FONTAINE on: 3/17/2021 03:10 PM     Modules accepted: Orders     From: Nevaeh Barton  To: Erika Miller  Sent: 4/24/2022 10:36 PM CDT  Subject: hip injection/refill diclofenac    On Friday my lateral left hip pain came roaring back. On Saturday and Sunday it went from major stiffness to having major difficulty getting in and out of the car, shower and just walking. It is causing me to walk differently which is causing issue with my right hip and knees. I would rate the pain a 10. With Tylenol it declines to an 8. I am wondering if it has been long enough since my last injection to have another left lateral hip injection as soon as you could fit me into your schedule. I could do [place] or the [facility]. Also could you refill my Diclofenac at the [facility]. Thanks!

## 2022-05-04 ENCOUNTER — OFFICE VISIT (OUTPATIENT)
Dept: OPHTHALMOLOGY | Facility: CLINIC | Age: 67
End: 2022-05-04
Attending: OPHTHALMOLOGY
Payer: COMMERCIAL

## 2022-05-04 DIAGNOSIS — Z53.9 NO SHOW: Primary | ICD-10-CM

## 2022-05-04 PROCEDURE — 999N000103 HC STATISTIC NO CHARGE FACILITY FEE

## 2022-05-04 RX ORDER — CYCLOSPORINE 0.5 MG/ML
EMULSION OPHTHALMIC
COMMUNITY
Start: 2022-04-04 | End: 2023-05-23

## 2022-05-04 ASSESSMENT — VISUAL ACUITY
OD_CC+: -2
OS_CC: 20/25
OD_CC: 20/20
CORRECTION_TYPE: GLASSES
METHOD: SNELLEN - LINEAR

## 2022-05-04 ASSESSMENT — REFRACTION_WEARINGRX
OD_ADD: +2.50
SPECS_TYPE: PAL
OD_AXIS: 024
OS_CYLINDER: +0.75
OS_ADD: +2.50
OD_CYLINDER: +0.25
OD_SPHERE: -1.50
OS_SPHERE: -0.75
OS_AXIS: 150

## 2022-05-04 ASSESSMENT — TONOMETRY
IOP_METHOD: TONOPEN
OD_IOP_MMHG: 9
OS_IOP_MMHG: 12

## 2022-05-04 ASSESSMENT — CONF VISUAL FIELD
METHOD: COUNTING FINGERS
OS_NORMAL: 1
OD_NORMAL: 1

## 2022-05-04 NOTE — PROGRESS NOTES
No charge  Pt unable to stay for duration of appt today as she has another appt elsewhere - deferred dilation and testing.  Requests that her Restasis rx be changed to Xiidra  Technician stressed importance of RSC'ing for her follow-up (to which she is overdue)    AYLIN Carballo 11:03 AM May 4, 2022

## 2022-05-04 NOTE — NURSING NOTE
Chief Complaints and History of Present Illnesses   Patient presents with     Dry Eye(s) Both Eyes     Chief Complaint(s) and History of Present Illness(es)     Dry Eye(s) Both Eyes     Laterality: both eyes    Characteristics: chronic    Associated symptoms: dryness.  Negative for red eyes, eye pain, burning and itching    Frequency: intermittently    Course: stable    Pain scale: 0/10              Comments     Patient presents to clinic for her dry eyes. Her vision in both eyes have been fluctuating. She wants to address her current eye drop of Restasis. Otherwise, she prefers no dilation and testings today - she is okay setting up another appointment for that follow-up.     Bert RUSSELL 10:41 AM May 4, 2022

## 2022-05-06 ASSESSMENT — EXTERNAL EXAM - LEFT EYE: OS_EXAM: NORMAL

## 2022-05-06 ASSESSMENT — EXTERNAL EXAM - RIGHT EYE: OD_EXAM: NORMAL

## 2022-05-08 DIAGNOSIS — H04.129 DRY EYE: Primary | ICD-10-CM

## 2022-05-08 ASSESSMENT — ENCOUNTER SYMPTOMS
DYSURIA: 0
SHORTNESS OF BREATH: 0
JOINT SWELLING: 0
CHILLS: 0
WEAKNESS: 0
COUGH: 0
HEMATURIA: 0
HEADACHES: 0
DIARRHEA: 0
HEMATOCHEZIA: 0
PARESTHESIAS: 0
NERVOUS/ANXIOUS: 0
FEVER: 0
BREAST MASS: 0
FREQUENCY: 0
ABDOMINAL PAIN: 0
SORE THROAT: 0
NAUSEA: 0
CONSTIPATION: 0
DIZZINESS: 0
EYE PAIN: 0
MYALGIAS: 0
PALPITATIONS: 0
HEARTBURN: 0
ARTHRALGIAS: 1

## 2022-05-08 ASSESSMENT — ACTIVITIES OF DAILY LIVING (ADL): CURRENT_FUNCTION: NO ASSISTANCE NEEDED

## 2022-05-11 ENCOUNTER — OFFICE VISIT (OUTPATIENT)
Dept: FAMILY MEDICINE | Facility: CLINIC | Age: 67
End: 2022-05-11
Payer: COMMERCIAL

## 2022-05-11 VITALS
HEIGHT: 63 IN | WEIGHT: 177 LBS | OXYGEN SATURATION: 97 % | BODY MASS INDEX: 31.36 KG/M2 | HEART RATE: 66 BPM | SYSTOLIC BLOOD PRESSURE: 110 MMHG | RESPIRATION RATE: 15 BRPM | DIASTOLIC BLOOD PRESSURE: 68 MMHG | TEMPERATURE: 98.1 F

## 2022-05-11 DIAGNOSIS — A60.04 HERPES SIMPLEX VULVOVAGINITIS: ICD-10-CM

## 2022-05-11 DIAGNOSIS — Z83.49 FAMILY HISTORY OF THYROID DISEASE: ICD-10-CM

## 2022-05-11 DIAGNOSIS — R94.6 ABNORMAL RESULTS OF THYROID FUNCTION STUDIES: ICD-10-CM

## 2022-05-11 DIAGNOSIS — M53.3 SI (SACROILIAC) JOINT DYSFUNCTION: ICD-10-CM

## 2022-05-11 DIAGNOSIS — Z78.0 ASYMPTOMATIC MENOPAUSAL STATE: ICD-10-CM

## 2022-05-11 DIAGNOSIS — Z00.00 ENCOUNTER FOR MEDICARE ANNUAL WELLNESS EXAM: Primary | ICD-10-CM

## 2022-05-11 PROBLEM — M25.571 PAIN IN JOINT INVOLVING ANKLE AND FOOT, RIGHT: Status: RESOLVED | Noted: 2018-01-25 | Resolved: 2022-05-11

## 2022-05-11 PROCEDURE — 99213 OFFICE O/P EST LOW 20 MIN: CPT | Mod: 25 | Performed by: FAMILY MEDICINE

## 2022-05-11 PROCEDURE — 90732 PPSV23 VACC 2 YRS+ SUBQ/IM: CPT | Performed by: FAMILY MEDICINE

## 2022-05-11 PROCEDURE — 84443 ASSAY THYROID STIM HORMONE: CPT | Performed by: FAMILY MEDICINE

## 2022-05-11 PROCEDURE — 36415 COLL VENOUS BLD VENIPUNCTURE: CPT | Performed by: FAMILY MEDICINE

## 2022-05-11 PROCEDURE — G0009 ADMIN PNEUMOCOCCAL VACCINE: HCPCS | Performed by: FAMILY MEDICINE

## 2022-05-11 PROCEDURE — G0402 INITIAL PREVENTIVE EXAM: HCPCS | Performed by: FAMILY MEDICINE

## 2022-05-11 PROCEDURE — 80048 BASIC METABOLIC PNL TOTAL CA: CPT | Performed by: FAMILY MEDICINE

## 2022-05-11 RX ORDER — MELOXICAM 7.5 MG/1
7.5 TABLET ORAL DAILY
Qty: 30 TABLET | Refills: 1 | Status: SHIPPED | OUTPATIENT
Start: 2022-05-11 | End: 2022-12-29

## 2022-05-11 RX ORDER — ACYCLOVIR 400 MG/1
400 TABLET ORAL 2 TIMES DAILY
Qty: 30 TABLET | Refills: 1 | Status: SHIPPED | OUTPATIENT
Start: 2022-05-11 | End: 2022-12-23

## 2022-05-11 ASSESSMENT — ENCOUNTER SYMPTOMS
SHORTNESS OF BREATH: 0
CONSTIPATION: 0
FREQUENCY: 0
PARESTHESIAS: 0
WEAKNESS: 0
NAUSEA: 0
CHILLS: 0
HEARTBURN: 0
DIZZINESS: 0
MYALGIAS: 0
EYE PAIN: 0
BREAST MASS: 0
HEADACHES: 0
HEMATOCHEZIA: 0
DYSURIA: 0
PALPITATIONS: 0
DIARRHEA: 0
JOINT SWELLING: 0
FEVER: 0
HEMATURIA: 0
ABDOMINAL PAIN: 0
ARTHRALGIAS: 1
NERVOUS/ANXIOUS: 0
COUGH: 0
SORE THROAT: 0

## 2022-05-11 ASSESSMENT — ACTIVITIES OF DAILY LIVING (ADL): CURRENT_FUNCTION: NO ASSISTANCE NEEDED

## 2022-05-11 ASSESSMENT — ASTHMA QUESTIONNAIRES: ACT_TOTALSCORE: 25

## 2022-05-11 NOTE — PATIENT INSTRUCTIONS
Remember Nature is like multivitamin!  Make an effort to spend some time outside every day.  If you spend [place]ak in [place] vitamin D 400-100 daily is a good idea October-April.  Social connections are also like a multivitamin and give us micro-boosts that keep us healthy and happy.  Make an effort to maintain and sustain social connections in your life.  The MIND or Mediterranean diet are the best for heart and brain health as well as have a lower lifetime cancer risk.  Add strength training in 2-3x per week (body pump).    You are due for your bone density or Dexa Scan.  Call to schedule this at one of the following locations:  [facility] or [facility] ([address]) Radiology [phone]  [facility] Radiology call [phone].    You are due for your Shingrix or Zoster vaccination and TDAP.  Insurance (especially Medicare) doesn't always cover this vaccine when it is done at the doctor's office.  Instead, you can have this done at your [facility] pharmacy so you don't get a surprise bill.      Patient Education      Patient Education   Personalized Prevention Plan  You are due for the preventive services outlined below.  Your care team is available to assist you in scheduling these services.  If you have already completed any of these items, please share that information with your care team to update in your medical record.  Health Maintenance Due   Topic Date Due    Osteoporosis Screening  Never done    ANNUAL REVIEW OF HM ORDERS  Never done    Asthma Action Plan - yearly  08/02/2018    Zoster (Shingles) Vaccine (2 of 2) 03/26/2021    FALL RISK ASSESSMENT  Never done    Pneumococcal Vaccine (1 - PCV) 11/13/2021    Diptheria Tetanus Pertussis (DTAP/TDAP/TD) Vaccine (3 - Td or Tdap) 04/27/2022

## 2022-05-11 NOTE — PROGRESS NOTES
"SUBJECTIVE:   Valorie Hahn is a 66 year old female who presents for Preventive Visit.      Patient has been advised of split billing requirements and indicates understanding: Yes  Are you in the first 12 months of your Medicare coverage?  Yes,  Visual Acuity:  Right Eye: 10/40   Left Eye: 10/10  Both Eyes: 10/10 - not wearing corrective lens/ cataract on one eye     Healthy Habits:     In general, how would you rate your overall health?  Good    Frequency of exercise:  2-3 days/week    Duration of exercise:  45-60 minutes    Do you usually eat at least 4 servings of fruit and vegetables a day, include whole grains    & fiber and avoid regularly eating high fat or \"junk\" foods?  Yes    Taking medications regularly:  Yes    Medication side effects:  Not applicable    Ability to successfully perform activities of daily living:  No assistance needed    Home Safety:  No safety concerns identified    Hearing Impairment:  No hearing concerns    In the past 6 months, have you been bothered by leaking of urine?  No    In general, how would you rate your overall mental or emotional health?  Good      PHQ-2 Total Score: 0    Additional concerns today:  No    Do you feel safe in your environment? Yes    Have you ever done Advance Care Planning? (For example, a Health Directive, POLST, or a discussion with a medical provider or your loved ones about your wishes): Yes, advance care planning is on file.       Fall risk  Fallen 2 or more times in the past year?: No  Any fall with injury in the past year?: No    Cognitive Screening   1) Repeat 3 items (Leader, Season, Table)    2) Clock draw: NORMAL  3) 3 item recall: Recalls 1 object   Results: NORMAL clock, 1-2 items recalled: COGNITIVE IMPAIRMENT LESS LIKELY    Mini-CogTM Copyright ERIC Rust. Licensed by the author for use in [facility]; reprinted with permission (samantha@.[email or web address]). All rights reserved.      Do you have sleep apnea, excessive snoring or daytime " drowsiness?: no    Reviewed and updated as needed this visit by clinical staff   Tobacco  Allergies  Meds   Med Hx  Surg Hx  Fam Hx  Soc Hx          Reviewed and updated as needed this visit by Provider                   Social History     Tobacco Use     Smoking status: Former Smoker     Packs/day: 0.50     Years: 10.00     Pack years: 5.00     Types: Cigarettes     Start date: 1990     Quit date: 2000     Years since quittin.9     Smokeless tobacco: Never Used   Substance Use Topics     Alcohol use: Yes     Comment: 1-2 glasses of wine per night     If you drink alcohol do you typically have >3 drinks per day or >7 drinks per week? No    Alcohol Use 2022   Prescreen: >3 drinks/day or >7 drinks/week? No   Prescreen: >3 drinks/day or >7 drinks/week? -               Current providers sharing in care for this patient include:   Patient Care Team:  Lisbeth Ruiz APRN CNP (Inactive) as PCP - General (Nurse Practitioner)  Saul Lu MD as MD (Family Medicine - Sports Medicine)  Joan Sher MD as Referring Physician (Family Practice)  Sanjay Plummer MD as MD (Cardiology)  Amy Winter MD as MD (Cardiology)  [facility] as Physician (Optometry)  Danna Garcia MD as MD (Ophthalmology)  Priya Tabares MD as Assigned Surgical Provider  Yi Obrien MD as Referring Physician (Pulmonary Disease)  Sheron Gunter MD as Referring Physician (Internal Medicine)  Anjali Kapadia MD as Assigned PCP  Aaron Hamlin MD as MD (Otolaryngology)    The following health maintenance items are reviewed in Epic and correct as of today:  Health Maintenance Due   Topic Date Due     DEXA  Never done     ANNUAL REVIEW OF HM ORDERS  Never done     ASTHMA ACTION PLAN  2018     ZOSTER IMMUNIZATION (2 of 2) 2021     FALL RISK ASSESSMENT  Never done     MEDICARE ANNUAL WELLNESS VISIT  2022      "Pneumococcal Vaccine: 65+ Years (1 - PCV) 11/13/2021     DTAP/TDAP/TD IMMUNIZATION (3 - Td or Tdap) 04/27/2022           Breast CA Risk Assessment (FHS-7) 1/28/2021   Do you have a family history of breast, colon, or ovarian cancer? No / Unknown         Mammogram Screening: Recommended mammography every 1-2 years with patient discussion and risk factor consideration  Pertinent mammograms are reviewed under the imaging tab.    Review of Systems   Constitutional: Negative for chills and fever.   HENT: Negative for congestion, ear pain, hearing loss and sore throat.    Eyes: Negative for pain and visual disturbance.   Respiratory: Negative for cough and shortness of breath.    Cardiovascular: Negative for chest pain, palpitations and peripheral edema.   Gastrointestinal: Negative for abdominal pain, constipation, diarrhea, heartburn, hematochezia and nausea.   Breasts:  Negative for tenderness, breast mass and discharge.   Genitourinary: Negative for dysuria, frequency, genital sores, hematuria, pelvic pain, urgency, vaginal bleeding and vaginal discharge.   Musculoskeletal: Positive for arthralgias. Negative for joint swelling and myalgias.   Skin: Negative for rash.   Neurological: Negative for dizziness, weakness, headaches and paresthesias.   Psychiatric/Behavioral: Negative for mood changes. The patient is not nervous/anxious.          OBJECTIVE:   /68 (BP Location: Left arm, Patient Position: Chair, Cuff Size: Adult Regular)   Pulse 66   Temp 98.1  F (36.7  C) (Temporal)   Resp 15   Ht 1.6 m (5' 3\")   Wt 80.3 kg (177 lb)   LMP 02/14/2010   SpO2 97%   BMI 31.35 kg/m   Estimated body mass index is 31.35 kg/m  as calculated from the following:    Height as of this encounter: 1.6 m (5' 3\").    Weight as of this encounter: 80.3 kg (177 lb).  Physical Exam  Constitutional:       General: She is not in acute distress.     Appearance: She is well-developed.   HENT:      Right Ear: Tympanic membrane and " external ear normal.      Left Ear: Tympanic membrane and external ear normal.      Nose: Nose normal.      Mouth/Throat:      Pharynx: No oropharyngeal exudate.   Eyes:      General:         Right eye: No discharge.         Left eye: No discharge.      Conjunctiva/sclera: Conjunctivae normal.      Pupils: Pupils are equal, round, and reactive to light.   Neck:      Thyroid: No thyromegaly.      Trachea: No tracheal deviation.   Cardiovascular:      Rate and Rhythm: Normal rate and regular rhythm.      Pulses: Normal pulses.      Heart sounds: Normal heart sounds, S1 normal and S2 normal. No murmur heard.    No friction rub. No S3 or S4 sounds.   Pulmonary:      Effort: Pulmonary effort is normal. No respiratory distress.      Breath sounds: Normal breath sounds. No wheezing or rales.   Abdominal:      General: Bowel sounds are normal.      Palpations: Abdomen is soft. There is no mass.      Tenderness: There is no abdominal tenderness.   Musculoskeletal:         General: Normal range of motion.      Cervical back: Neck supple.   Lymphadenopathy:      Cervical: No cervical adenopathy.   Skin:     General: Skin is warm and dry.      Findings: No rash.   Neurological:      Mental Status: She is alert and oriented to person, place, and time.      Motor: No abnormal muscle tone.      Deep Tendon Reflexes: Reflexes are normal and symmetric.   Psychiatric:         Thought Content: Thought content normal.         Judgment: Judgment normal.               ASSESSMENT / PLAN:   Valorie was seen today for physical.    Diagnoses and all orders for this visit:    Encounter for Medicare annual wellness exam  Comments:  Mammo: Up-to-date due in November  Colonoscopy: Up-to-date  Immunizations: Due for pneumococcal and Shingrix  Labs: Up-to-date, follow-up yearly  Orders:  -     DEXA HIP/PELVIS/SPINE - Future; Future    Asymptomatic menopausal state   Comments:  DEXA scan ordered    Orders:  -     DEXA HIP/PELVIS/SPINE - Future;  Future  -     TSH with free T4 reflex; Future    SI (sacroiliac) joint dysfunction  Comments:  Uses rare Mobic  Refilled check BMP for Mobic use  Orders:  -     meloxicam (MOBIC) 7.5 MG tablet; Take 1 tablet (7.5 mg) by mouth daily APPOINTMENT REQUIRED PRIOR TO ADD'L REFILLS.  -     Basic metabolic panel  (Ca, Cl, CO2, Creat, Gluc, K, Na, BUN); Future    Herpes simplex vulvovaginitis  Comments:  Acyclovir, refilled  Stable  Orders:  -     acyclovir (ZOVIRAX) 400 MG tablet; Take 1 tablet (400 mg) by mouth 2 times daily For 3 days with each outbreak.    Family history of thyroid disease  Comments:  Family history of thyroid problems  Check yearly  Ordered today  Orders:  -     TSH with free T4 reflex; Future    Other orders  -     REVIEW OF HEALTH MAINTENANCE PROTOCOL ORDERS  -     PNEUMOCOCCAL VACCINE,ADULT,SQ OR IM ([id number])        Patient Instructions         1. Remember Nature is like multivitamin!  Make an effort to spend some time outside every day.  2. If you spend [place] in [place] vitamin D 400-100 daily is a good idea October-April.  3. Social connections are also like a multivitamin and give us micro-boosts that keep us healthy and happy.  Make an effort to maintain and sustain social connections in your life.  4. The MIND or Mediterranean diet are the best for heart and brain health as well as have a lower lifetime cancer risk.  5. Add strength training in 2-3x per week (body pump).    You are due for your bone density or Dexa Scan.  Call to schedule this at one of the following locations:  [facility] or [facility] ([address]) Radiology [phone]  [facility] Radiology call [phone].    You are due for your Shingrix or Zoster vaccination and TDAP.  Insurance (especially Medicare) doesn't always cover this vaccine when it is done at the doctor's office.  Instead, you can have this done at your [facility] pharmacy so you don't get a surprise bill.      Patient has been advised of split  "billing requirements and indicates understanding: Yes    COUNSELING:  Reviewed preventive health counseling, as reflected in patient instructions    Estimated body mass index is 31.35 kg/m  as calculated from the following:    Height as of this encounter: 1.6 m (5' 3\").    Weight as of this encounter: 80.3 kg (177 lb).    Weight management plan: Discussed healthy diet and exercise guidelines    She reports that she quit smoking about 21 years ago. Her smoking use included cigarettes. She started smoking about 32 years ago. She has a 5.00 pack-year smoking history. She has never used smokeless tobacco.      Appropriate preventive services were discussed with this patient, including applicable screening as appropriate for cardiovascular disease, diabetes, osteopenia/osteoporosis, and glaucoma.  As appropriate for age/gender, discussed screening for colorectal cancer, prostate cancer, breast cancer, and cervical cancer. Checklist reviewing preventive services available has been given to the patient.    Reviewed patients plan of care and provided an AVS. The Basic Care Plan (routine screening as documented in Health Maintenance) for Valorie meets the Care Plan requirement. This Care Plan has been established and reviewed with the Patient.    Counseling Resources:  ATP IV Guidelines  Pooled Cohorts Equation Calculator  Breast Cancer Risk Calculator  Breast Cancer: Medication to Reduce Risk  FRAX Risk Assessment  ICSI Preventive Guidelines  Dietary Guidelines for Americans, 2010  Goombal's MyPlate  ASA Prophylaxis  Lung CA Screening    Anjali Kapadia MD  [facility]    Identified Health Risks:    "

## 2022-05-12 LAB
ANION GAP SERPL CALCULATED.3IONS-SCNC: 5 MMOL/L (ref 3–14)
BUN SERPL-MCNC: 14 MG/DL (ref 7–30)
CALCIUM SERPL-MCNC: 8.8 MG/DL (ref 8.5–10.1)
CHLORIDE BLD-SCNC: 110 MMOL/L (ref 94–109)
CO2 SERPL-SCNC: 26 MMOL/L (ref 20–32)
CREAT SERPL-MCNC: 0.63 MG/DL (ref 0.52–1.04)
GFR SERPL CREATININE-BSD FRML MDRD: >90 ML/MIN/1.73M2
GLUCOSE BLD-MCNC: 90 MG/DL (ref 70–99)
POTASSIUM BLD-SCNC: 4.3 MMOL/L (ref 3.4–5.3)
SODIUM SERPL-SCNC: 141 MMOL/L (ref 133–144)
TSH SERPL DL<=0.005 MIU/L-ACNC: 1.82 MU/L (ref 0.4–4)

## 2022-05-13 DIAGNOSIS — H04.129 DRY EYE: ICD-10-CM

## 2022-05-13 NOTE — TELEPHONE ENCOUNTER
lifitegrast (XIIDRA) 5 % opthalmic solution    Last Written Prescription Date:  5/8/2022  Last Fill Quantity: 60,   # refills: 3  Last Office Visit :   1/65/2022  (No show 5/4/2022)  Future Office visit:  None     Priya Tabares MD    Ophthalmology       Assessment & Plan      CC: ERM and post op CME  HPI: Valorie Hahn is a  66 year old year-old patient with history of cme after cataract surgery in each eye. Complaining today of waviness in vision right eye.      Interval History: Since 1/1/22, she has noticed intermittent small flashes of light in her left eye when she is in the dark. She notes small black squiggle floaters in the left eye, stable vs mildly worsened. Since onset, she notes flashes and floaters have decreased. No change in visual acuity, eye pain, or curtain.     OCULAR HISTORY  CEIOL 3/8/2019 OD, 3/1/2019 OS - Dr. Garcia              -Postop CME each eye, OD > OS  S/p YAG cap OD 4/23/20     Retinal Imaging:  OCT 01/06/22   OD - 1+ ERM with irregular foveal contour worsened, no CME  OS - Normal central retina, PHF detached      Assessment & Plan:         # Posterior vitreous detachment (PVD), left eye              - Onset 1/1/22              - No holes/tears/detachment on  360 (1/6/22)              - Retinal tear/detachment precautions given, to return to clinic if those occur              - Otherwise recheck with dilated fundus exam and  in 2 weeks     #. Recurrent CME, right eye, resolved              -Onset post CEIOL              - patient mildly symptomatic even with 20/20 vision              - no CME off gtt since dec.2020                #. H/o CME, left eye, resolved              -Onset post-CEIOL              -No IRF today              -Monitor     # ERM, OD              -VA 20/20, notes mild distortion              -Would like to defer surgery at this time     # Pseudophakia, OU              -Dr. Garcia - OD 3/8/2019, OS 3/1/2019               -S/p YAG cap OD  4/2020              -Clear visual axis     return to clinic: 4 weeks for dilated fundus exam, optos left eye     Routing refill request to provider for review/approval because:  Drug not on the [facility], [facility] or [facility] refill protocol or controlled substance      Joan Sal RN  Central Triage Red Flags/Med Refills

## 2022-05-31 ENCOUNTER — ANCILLARY PROCEDURE (OUTPATIENT)
Dept: BONE DENSITY | Facility: CLINIC | Age: 67
End: 2022-05-31
Attending: FAMILY MEDICINE
Payer: COMMERCIAL

## 2022-05-31 DIAGNOSIS — Z00.00 ENCOUNTER FOR MEDICARE ANNUAL WELLNESS EXAM: ICD-10-CM

## 2022-05-31 DIAGNOSIS — Z78.0 ASYMPTOMATIC MENOPAUSAL STATE: ICD-10-CM

## 2022-05-31 PROCEDURE — 77080 DXA BONE DENSITY AXIAL: CPT | Performed by: INTERNAL MEDICINE

## 2022-06-03 PROBLEM — M85.89 OSTEOPENIA OF MULTIPLE SITES: Status: ACTIVE | Noted: 2022-06-03

## 2022-06-03 NOTE — RESULT ENCOUNTER NOTE
Darin Eugene,  Your DEXA/bone density scan shows you have osteopenia.  This is a mild thinning/weakening of the bones, but not as severe as osteoporosis.  The following recommendations help to keep your bones as strong as possible:  Ensure adequate dietary calcium (3-4 servings a day or 1200 mg) and vitamin D (1000 IU daily) intake.  Weight bearing exercise 3-4 times a week or more.  Let me know if you have any questions.  Best,  Dr. Anjali Kapadia MD/[facility]

## 2022-10-23 ENCOUNTER — HEALTH MAINTENANCE LETTER (OUTPATIENT)
Age: 67
End: 2022-10-23

## 2022-12-01 NOTE — TELEPHONE ENCOUNTER
DIAGNOSIS: Left knee locks up, painfully, once or twice a day. Intermittent but feels unstable. Never had knee issues before .   APPOINTMENT DATE: 12.13.22   NOTES STATUS DETAILS   MEDICATION LIST Internal    XRAYS (IMAGES & REPORTS) Internal 4.27.15 B knee

## 2022-12-05 ENCOUNTER — ANCILLARY PROCEDURE (OUTPATIENT)
Dept: MAMMOGRAPHY | Facility: CLINIC | Age: 67
End: 2022-12-05
Attending: FAMILY MEDICINE
Payer: COMMERCIAL

## 2022-12-05 DIAGNOSIS — Z12.31 VISIT FOR SCREENING MAMMOGRAM: ICD-10-CM

## 2022-12-05 PROCEDURE — 77067 SCR MAMMO BI INCL CAD: CPT | Mod: 26 | Performed by: RADIOLOGY

## 2022-12-05 PROCEDURE — 77063 BREAST TOMOSYNTHESIS BI: CPT | Mod: 26 | Performed by: RADIOLOGY

## 2022-12-05 PROCEDURE — 77067 SCR MAMMO BI INCL CAD: CPT

## 2022-12-07 DIAGNOSIS — M25.562 LEFT KNEE PAIN: Primary | ICD-10-CM

## 2022-12-07 NOTE — PROGRESS NOTES
"[facility]  Sports Medicine Clinic  Cl[facility] Center           SUBJECTIVE       Valorie Hahn is a 67 year old female presenting to clinic today for left knee pain.    Background:   Date of injury: No injury  Duration of symptoms: 1 week   Mechanism of Injury: Pt was walking and felt her knee \"seeze\" up  Intensity: 7/10   Aggravating factors: Sitting to standing, walking   Relieving Factors: ice, ibuprofen   Prior Evaluation: None    Study Result    Narrative & Impression   3 views right knee radiographs 9/27/2018 9:05 AM     History:  Right knee pain, unspecified chronicity     Comparison: 4/27/2015     Findings:     AP , patellofemoral, and lateral views of the right knee were  obtained. Minimal bilateral medial femorotibial joint space narrowing  and sclerosis. Right knee; patellofemoral and medial compartmental  osteophyte formation. Left knee; medial compartment osteophyte  formation. Peaking of the medial tibial spines bilaterally. Mild joint  effusion. Soft tissue grossly unremarkable. No fractures or  dislocation.                                                                      Impression:  1. Mild osteoarthritis evidenced by mild medial joint space narrowing  and sclerosis. Left knee medial compartment osteophytosis and right  knee patellofemoral and medial compartment osteophytosis.   2. No fracture or dislocation.            PMH, Medications and Allergies were reviewed and updated as needed.    ROS:  As noted above otherwise negative.    Patient Active Problem List   Diagnosis     Herpes simplex vulvovaginitis     Mild intermittent asthma without complication     Family history of thyroid disease     Advanced directives, counseling/discussion     SI (sacroiliac) joint dysfunction     Osteitis, condensans     Sacroiliac joint disease     SI (sacroiliac) pain     Colon polyps     Dyspnea     Posterior vitreous detachment     CME (cystoid macular edema), right     Cystoid " macular edema, left eye     Finger mass, right     Intermediate uveitis of both eyes     Pseudophakia of both eyes     History of 2019 novel coronavirus disease (COVID-19)     Osteopenia of multiple sites       Current Outpatient Medications   Medication Sig Dispense Refill     diclofenac (VOLTAREN) 1 % topical gel Apply 4 g topically 4 times daily as needed for moderate pain (4-6) 350 g 0     acyclovir (ZOVIRAX) 400 MG tablet Take 1 tablet (400 mg) by mouth 2 times daily For 3 days with each outbreak. 30 tablet 1     albuterol (PROAIR HFA/PROVENTIL HFA/VENTOLIN HFA) 108 (90 Base) MCG/ACT inhaler Inhale 2 puffs into the lungs every 6 hours as needed for shortness of breath / dyspnea or wheezing 8 g 0     lifitegrast (XIIDRA) 5 % opthalmic solution Place 1 drop into both eyes 2 times daily 5 each 18     meloxicam (MOBIC) 7.5 MG tablet Take 1 tablet (7.5 mg) by mouth daily APPOINTMENT REQUIRED PRIOR TO ADD'L REFILLS. 30 tablet 1     RESTASIS 0.05 % ophthalmic emulsion               OBJECTIVE:       Vitals: There were no vitals filed for this visit.  BMI: There is no height or weight on file to calculate BMI.    Gen:  Well nourished and in no acute distress  HEENT: Extraocular movement intact  Neck: Supple  Pulm:  Breathing Comfortably. No increased respiratory effort.  Psych: Euthymic. Appropriately answers questions    MSK: Left knee without evidence of effusion anterior posterior.  Range of motion full from 0 to 140 degrees.  No quad tendon, patella tendon, or joint line tenderness to palpation.  Some tenderness to palpation at the biceps for Dread distal attachment on the radial head.  Eversion of the foot with resisted flexion causes pain in the same area.  No hamstring mid muscle belly tenderness to palpation.  Negative Lachman, posterior drawer, and Brennon.  No varus or valgus stress testing pain.  Negative patellar compression or apprehension.      XRAY : Mild degenerative changes of the medial compartment,  consistent with previous x-rays from 2015.          ASSESSMENT and PLAN:     Valorie was seen today for pain.    Diagnoses and all orders for this visit:    Strain of left hamstring muscle, sequela  -     diclofenac (VOLTAREN) 1 % topical gel; Apply 4 g topically 4 times daily as needed for moderate pain (4-6)      67-year-old otherwise healthy female presenting to clinic with chronic left knee pain, posterior.  Patient does have intermittent pain in behind the knee worsened with walking and then stretching out the leg.  She is not having any evidence of instability, swelling, catching, or locking.  Based on my examination, the patient is not having any intra-articular pain at the moment, all of her pain does seem to be coming posteriorly and extra-articular in the area of the biceps for Dread of the hamstring muscle.  Have discussed options for management with the patient extensively.  Given the fact that this is a chronic, and has been causing her intermittent pain with activities that she enjoys, we have placed her in physical therapy.  I have also provided a topical Voltaren to be used up to 4 times daily as needed.  Would like him to return to clinic in 6 weeks to evaluate her improvement.  If she has none, would consider MRI of the left knee for evaluation given the distal hamstring pain.    Options for treatment and/or follow-up care were reviewed with the patient was actively involved in the decision making process. Patient verbalized understanding and was in agreement with the plan.    Alex Byrne DO  , Sports Medicine  Department of Family Medicine and [facility]

## 2022-12-08 ENCOUNTER — ANCILLARY PROCEDURE (OUTPATIENT)
Dept: GENERAL RADIOLOGY | Facility: CLINIC | Age: 67
End: 2022-12-08
Attending: STUDENT IN AN ORGANIZED HEALTH CARE EDUCATION/TRAINING PROGRAM
Payer: COMMERCIAL

## 2022-12-08 ENCOUNTER — OFFICE VISIT (OUTPATIENT)
Dept: ORTHOPEDICS | Facility: CLINIC | Age: 67
End: 2022-12-08
Payer: COMMERCIAL

## 2022-12-08 DIAGNOSIS — M25.562 LEFT KNEE PAIN: ICD-10-CM

## 2022-12-08 DIAGNOSIS — S76.312S STRAIN OF LEFT HAMSTRING MUSCLE, SEQUELA: Primary | ICD-10-CM

## 2022-12-08 PROCEDURE — 73562 X-RAY EXAM OF KNEE 3: CPT | Mod: LT | Performed by: RADIOLOGY

## 2022-12-08 PROCEDURE — 99204 OFFICE O/P NEW MOD 45 MIN: CPT | Performed by: STUDENT IN AN ORGANIZED HEALTH CARE EDUCATION/TRAINING PROGRAM

## 2022-12-08 NOTE — LETTER
"12/8/2022    RE: Valorie Hahn  [address]  [facility] [address]     Dear Colleague,    Thank you for referring your patient, Valorie Hahn, to the [facility] [place]. Please see a copy of my visit note below.    [facility]  Sports Medicine Clinic  Clinics and Surgery Center           SUBJECTIVE       Valorie Hahn is a 67 year old female presenting to clinic today for left knee pain.    Background:   Date of injury: No injury  Duration of symptoms: 1 week   Mechanism of Injury: Pt was walking and felt her knee \"seeze\" up  Intensity: 7/10   Aggravating factors: Sitting to standing, walking   Relieving Factors: ice, ibuprofen   Prior Evaluation: None    Study Result    Narrative & Impression   3 views right knee radiographs 9/27/2018 9:05 AM     History:  Right knee pain, unspecified chronicity     Comparison: 4/27/2015     Findings:     AP , patellofemoral, and lateral views of the right knee were  obtained. Minimal bilateral medial femorotibial joint space narrowing  and sclerosis. Right knee; patellofemoral and medial compartmental  osteophyte formation. Left knee; medial compartment osteophyte  formation. Peaking of the medial tibial spines bilaterally. Mild joint  effusion. Soft tissue grossly unremarkable. No fractures or  dislocation.                                                                      Impression:  1. Mild osteoarthritis evidenced by mild medial joint space narrowing  and sclerosis. Left knee medial compartment osteophytosis and right  knee patellofemoral and medial compartment osteophytosis.   2. No fracture or dislocation.            PMH, Medications and Allergies were reviewed and updated as needed.    ROS:  As noted above otherwise negative.    Patient Active Problem List   Diagnosis     Herpes simplex vulvovaginitis     Mild intermittent asthma without complication     Family history of thyroid disease     " Advanced directives, counseling/discussion     SI (sacroiliac) joint dysfunction     Osteitis, condensans     Sacroiliac joint disease     SI (sacroiliac) pain     Colon polyps     Dyspnea     Posterior vitreous detachment     CME (cystoid macular edema), right     Cystoid macular edema, left eye     Finger mass, right     Intermediate uveitis of both eyes     Pseudophakia of both eyes     History of 2019 novel coronavirus disease (COVID-19)     Osteopenia of multiple sites       Current Outpatient Medications   Medication Sig Dispense Refill     diclofenac (VOLTAREN) 1 % topical gel Apply 4 g topically 4 times daily as needed for moderate pain (4-6) 350 g 0     acyclovir (ZOVIRAX) 400 MG tablet Take 1 tablet (400 mg) by mouth 2 times daily For 3 days with each outbreak. 30 tablet 1     albuterol (PROAIR HFA/PROVENTIL HFA/VENTOLIN HFA) 108 (90 Base) MCG/ACT inhaler Inhale 2 puffs into the lungs every 6 hours as needed for shortness of breath / dyspnea or wheezing 8 g 0     lifitegrast (XIIDRA) 5 % opthalmic solution Place 1 drop into both eyes 2 times daily 5 each 18     meloxicam (MOBIC) 7.5 MG tablet Take 1 tablet (7.5 mg) by mouth daily APPOINTMENT REQUIRED PRIOR TO ADD'L REFILLS. 30 tablet 1     RESTASIS 0.05 % ophthalmic emulsion               OBJECTIVE:       Vitals: There were no vitals filed for this visit.  BMI: There is no height or weight on file to calculate BMI.    Gen:  Well nourished and in no acute distress  HEENT: Extraocular movement intact  Neck: Supple  Pulm:  Breathing Comfortably. No increased respiratory effort.  Psych: Euthymic. Appropriately answers questions    MSK: Left knee without evidence of effusion anterior posterior.  Range of motion full from 0 to 140 degrees.  No quad tendon, patella tendon, or joint line tenderness to palpation.  Some tenderness to palpation at the biceps for Dread distal attachment on the radial head.  Eversion of the foot with resisted flexion causes pain in  the same area.  No hamstring mid muscle belly tenderness to palpation.  Negative Lachman, posterior drawer, and Brennon.  No varus or valgus stress testing pain.  Negative patellar compression or apprehension.      XRAY : Mild degenerative changes of the medial compartment, consistent with previous x-rays from 2015.          ASSESSMENT and PLAN:     Valorie was seen today for pain.    Diagnoses and all orders for this visit:    Strain of left hamstring muscle, sequela  -     diclofenac (VOLTAREN) 1 % topical gel; Apply 4 g topically 4 times daily as needed for moderate pain (4-6)      67-year-old otherwise healthy female presenting to clinic with chronic left knee pain, posterior.  Patient does have intermittent pain in behind the knee worsened with walking and then stretching out the leg.  She is not having any evidence of instability, swelling, catching, or locking.  Based on my examination, the patient is not having any intra-articular pain at the moment, all of her pain does seem to be coming posteriorly and extra-articular in the area of the biceps for Dread of the hamstring muscle.  Have discussed options for management with the patient extensively.  Given the fact that this is a chronic, and has been causing her intermittent pain with activities that she enjoys, we have placed her in physical therapy.  I have also provided a topical Voltaren to be used up to 4 times daily as needed.  Would like him to return to clinic in 6 weeks to evaluate her improvement.  If she has none, would consider MRI of the left knee for evaluation given the distal hamstring pain.    Options for treatment and/or follow-up care were reviewed with the patient was actively involved in the decision making process. Patient verbalized understanding and was in agreement with the plan.    Alex Byrne DO  , Sports Medicine  Department of Family Medicine and [facility]

## 2022-12-13 ENCOUNTER — PRE VISIT (OUTPATIENT)
Dept: ORTHOPEDICS | Facility: CLINIC | Age: 67
End: 2022-12-13

## 2022-12-19 ENCOUNTER — THERAPY VISIT (OUTPATIENT)
Dept: PHYSICAL THERAPY | Facility: CLINIC | Age: 67
End: 2022-12-19
Payer: COMMERCIAL

## 2022-12-19 DIAGNOSIS — S76.312A STRAIN OF LEFT HAMSTRING MUSCLE: ICD-10-CM

## 2022-12-19 PROCEDURE — 97110 THERAPEUTIC EXERCISES: CPT | Mod: GP | Performed by: PHYSICAL THERAPIST

## 2022-12-19 PROCEDURE — 97161 PT EVAL LOW COMPLEX 20 MIN: CPT | Mod: GP | Performed by: PHYSICAL THERAPIST

## 2022-12-19 ASSESSMENT — ACTIVITIES OF DAILY LIVING (ADL)
RISE FROM A CHAIR: ACTIVITY IS SOMEWHAT DIFFICULT
AS_A_RESULT_OF_YOUR_KNEE_INJURY,_HOW_WOULD_YOU_RATE_YOUR_CURRENT_LEVEL_OF_DAILY_ACTIVITY?: ABNORMAL
KNEE_ACTIVITY_OF_DAILY_LIVING_SUM: 55
HOW_WOULD_YOU_RATE_THE_CURRENT_FUNCTION_OF_YOUR_KNEE_DURING_YOUR_USUAL_DAILY_ACTIVITIES_ON_A_SCALE_FROM_0_TO_100_WITH_100_BEING_YOUR_LEVEL_OF_KNEE_FUNCTION_PRIOR_TO_YOUR_INJURY_AND_0_BEING_THE_INABILITY_TO_PERFORM_ANY_OF_YOUR_USUAL_DAILY_ACTIVITIES?: 25
KNEEL ON THE FRONT OF YOUR KNEE: ACTIVITY IS NOT DIFFICULT
KNEE_ACTIVITY_OF_DAILY_LIVING_SCORE: 78.57
STIFFNESS: I DO NOT HAVE THE SYMPTOM
RAW_SCORE: 55
SQUAT: ACTIVITY IS NOT DIFFICULT
SWELLING: THE SYMPTOM AFFECTS MY ACTIVITY SLIGHTLY
STAND: ACTIVITY IS SOMEWHAT DIFFICULT
LIMPING: THE SYMPTOM AFFECTS MY ACTIVITY SLIGHTLY
PAIN: THE SYMPTOM AFFECTS MY ACTIVITY SLIGHTLY
WEAKNESS: I DO NOT HAVE THE SYMPTOM
WALK: ACTIVITY IS FAIRLY DIFFICULT
GIVING WAY, BUCKLING OR SHIFTING OF KNEE: THE SYMPTOM AFFECTS MY ACTIVITY SLIGHTLY
SIT WITH YOUR KNEE BENT: ACTIVITY IS NOT DIFFICULT
GO DOWN STAIRS: ACTIVITY IS NOT DIFFICULT
GO UP STAIRS: ACTIVITY IS NOT DIFFICULT
HOW_WOULD_YOU_RATE_THE_OVERALL_FUNCTION_OF_YOUR_KNEE_DURING_YOUR_USUAL_DAILY_ACTIVITIES?: ABNORMAL

## 2022-12-19 NOTE — PROGRESS NOTES
[facility]    OUTPATIENT Physical Therapy ORTHOPEDIC EVALUATION  PLAN OF TREATMENT FOR OUTPATIENT REHABILITATION  (COMPLETE FOR INITIAL CLAIMS ONLY)  Patient's Last Name, First Name, M.I.  YOB: 1955  Valorie Hahn    Provider s Name:  [facility]   Medical Record No.  [id number]   Start of Care Date:  12/19/22   Onset Date:   12/05/22   Treatment Diagnosis:  left hamstring strain Medical Diagnosis:  Strain of left hamstring muscle       Goals:     12/19/22 0500   Body Part   Goals listed below are for knee   Goal #1   Goal #1 ambulation   Previous Functional Level No restrictions   Current Functional Level Minutes patient can walk   Performance Level 10   STG Target Performance Minutes patient will be able to walk   Performance Level 20   Rationale for safe household ambulation;for safe outdoor household ambulation;for safe community ambulation   Due Date 01/09/23    LTG Target Performance Minutes patient will be able to  walk   Performance Level 45   Rationale for safe community ambulation;for safe outdoor household ambulation   Due Date 02/22/23       Therapy Frequency:  1 time per week  Predicted Duration of Therapy Intervention:  8 weeks    Carmelo Valladares, PT                 I CERTIFY THE NEED FOR THESE SERVICES FURNISHED UNDER        THIS PLAN OF TREATMENT AND WHILE UNDER MY CARE     (Physician attestation of this document indicates review and certification of the therapy plan).                     Certification Date From:  12/19/22   Certification Date To:  02/27/23    Referring Provider:  Alex Byrne    Initial Assessment        See Epic Evaluation SOC Date: 12/19/22

## 2022-12-19 NOTE — PROGRESS NOTES
Physical Therapy Initial Evaluation  Subjective:  HPI                  Physical Therapy Initial Examination/Evaluation  December 19, 2022    Valorie Hahn is a 67 year old female referred to physical therapy by Antonino Byrne for treatment of left postknee with Precautions/Restrictions/MD instructions none.  Hx of sxs-like a muscle spasm-seizes up knee.  Shaking leg helps.  Increased aggravating after walking 2 weeks (3-4 miles).    Subjective:  DOI/onset: 12/5/22   Acute Injury or Gradual Onset?: chronic in nature  Mechanism of Injury: walking, chronic in nature  Related PMH: on and off post knee pain, occasional swelling Previous Treatment: Rest Effect of prior treatment: fair  Imaging: x-ray, DJD  Chief Complaint/Functional Limitations:   Pain arising from a chair and after walking and see below in therapy evaluation codes   Pain: rest 0 /10, activity 6/10 Location: left post, medial knee Frequency: Intermittent Described as: aching and sharp Alleviated by: Rest and shaking leg Progression of Symptoms: Unchanged Time of day when pain is worse: Activity related  Sleeping: No issues/uninterrupted   Occupation: retired    Current HEP/exercise regimen: walking  Patient's goals are see chief complaints no pain with walking, arising from sitting    Other pertinent PMH/Red Flags: unremarkable  Barriers at home/work: none  Pertinent Surgical History: none  Medications: None as reported by patient  General health as reported by patient: good      Objective:  System  decr left hamstring flexibility, fair flexibility and guarding  TTP: left distal medial hamstring, no baker's cyst  No pain with resisted hamstring MMT strength left4/5 right 4+/5  No pain with hip add MMT 4+/5 RANDY  Quad contraction-pain at posterior knee on the left but good contraction  Knee AROM is full and painfree  Hip AROM flex, ext, Add/abd is full and painfree  SLS right 60 seconds  Left 30 seconds  2L squat no pain and good form    Physical  Exam    General     ROS    Assessment/Plan:    Patient is a 67 year old female with left side knee complaints.    Patient has the following significant findings with corresponding treatment plan.                Diagnosis 1:  Left hamstring tendinopathy  Pain -  hot/cold therapy, manual therapy, self management, education and home program  Decreased ROM/flexibility - manual therapy and therapeutic exercise  Decreased strength - therapeutic exercise and therapeutic activities  Impaired balance - neuro re-education and therapeutic activities  Decreased function - therapeutic activities    Therapy Evaluation Codes:   1) History comprised of:   Personal factors that impact the plan of care:      None.    Comorbidity factors that impact the plan of care are:      None.     Medications impacting care: None.  2) Examination of Body Systems comprised of:   Body structures and functions that impact the plan of care:      Knee.   Activity limitations that impact the plan of care are:      Sitting and Walking.  3) Clinical presentation characteristics are:   Stable/Uncomplicated.  4) Decision-Making    Low complexity using standardized patient assessment instrument and/or measureable assessment of functional outcome.  Cumulative Therapy Evaluation is: Low complexity.    Previous and current functional limitations:  (See Goal Flow Sheet for this information)    Short term and Long term goals: (See Goal Flow Sheet for this information)     Communication ability:  Patient appears to be able to clearly communicate and understand verbal and written communication and follow directions correctly.  Treatment Explanation - The following has been discussed with the patient:   RX ordered/plan of care  Anticipated outcomes  Possible risks and side effects  This patient would benefit from PT intervention to resume normal activities.   Rehab potential is good.    Frequency:  1 X week, once daily  Duration:  for 8 weeks  Discharge Plan:   Achieve all LTG.  Independent in home treatment program.  Reach maximal therapeutic benefit.    Please refer to the daily flowsheet for treatment today, total treatment time and time spent performing 1:1 timed codes.

## 2022-12-23 DIAGNOSIS — A60.04 HERPES SIMPLEX VULVOVAGINITIS: ICD-10-CM

## 2022-12-23 RX ORDER — ACYCLOVIR 400 MG/1
400 TABLET ORAL 2 TIMES DAILY
Qty: 30 TABLET | Refills: 1 | Status: SHIPPED | OUTPATIENT
Start: 2022-12-23 | End: 2023-05-11

## 2022-12-23 NOTE — TELEPHONE ENCOUNTER
Prescription approved per [facility] Refill Protocol.    Ashley Ruelas, BSN RN  [facility]

## 2022-12-26 ENCOUNTER — THERAPY VISIT (OUTPATIENT)
Dept: PHYSICAL THERAPY | Facility: CLINIC | Age: 67
End: 2022-12-26
Attending: STUDENT IN AN ORGANIZED HEALTH CARE EDUCATION/TRAINING PROGRAM
Payer: COMMERCIAL

## 2022-12-26 DIAGNOSIS — S76.312A STRAIN OF LEFT HAMSTRING MUSCLE: Primary | ICD-10-CM

## 2022-12-26 PROCEDURE — 97110 THERAPEUTIC EXERCISES: CPT | Mod: GP

## 2022-12-26 PROCEDURE — 97140 MANUAL THERAPY 1/> REGIONS: CPT | Mod: GP

## 2022-12-26 PROCEDURE — 97530 THERAPEUTIC ACTIVITIES: CPT | Mod: GP

## 2022-12-27 DIAGNOSIS — M53.3 SI (SACROILIAC) JOINT DYSFUNCTION: ICD-10-CM

## 2022-12-29 RX ORDER — MELOXICAM 7.5 MG/1
7.5 TABLET ORAL DAILY
Qty: 30 TABLET | Refills: 1 | Status: SHIPPED | OUTPATIENT
Start: 2022-12-29 | End: 2023-05-11

## 2022-12-29 NOTE — TELEPHONE ENCOUNTER
"Routing refill request to provider for review/approval because:  Normal ALT on file in past 12 months    Normal AST on file in past 12 months    Patient is age 6-64 years    Normal CBC on file in past 12 months   Marked \"appt req prior to additional refills\"    Last Written Prescription Date:  5/11/22  Last Fill Quantity: 30,  # refills: 1   Last office visit: 5/11/2022 with prescribing provider:  Kang   Future Office Visit:   Next 5 appointments (look out 90 days)    Jan 19, 2023  9:00 AM  (Arrive by 8:45 AM)  Return Visit with Alex Byrne DO  [facility] [place] ([facility] ) [address] [place] [address]  [address] [facility] [address] [phone]         Scheduling: please reach out for monica Singh RN  [facility]                "

## 2023-01-02 ENCOUNTER — THERAPY VISIT (OUTPATIENT)
Dept: PHYSICAL THERAPY | Facility: CLINIC | Age: 68
End: 2023-01-02
Attending: STUDENT IN AN ORGANIZED HEALTH CARE EDUCATION/TRAINING PROGRAM
Payer: COMMERCIAL

## 2023-01-02 DIAGNOSIS — S76.312A STRAIN OF LEFT HAMSTRING MUSCLE: Primary | ICD-10-CM

## 2023-01-02 PROCEDURE — 97530 THERAPEUTIC ACTIVITIES: CPT | Mod: GP

## 2023-01-02 PROCEDURE — 97140 MANUAL THERAPY 1/> REGIONS: CPT | Mod: GP

## 2023-01-02 PROCEDURE — 97110 THERAPEUTIC EXERCISES: CPT | Mod: GP

## 2023-03-27 ENCOUNTER — NURSE TRIAGE (OUTPATIENT)
Dept: FAMILY MEDICINE | Facility: CLINIC | Age: 68
End: 2023-03-27
Payer: COMMERCIAL

## 2023-03-27 NOTE — TELEPHONE ENCOUNTER
Can be seen in urgent care if not getting better  Discuss more with dr akers in an apt too  Go to er if worse  Try pepcid 20 mg twice a day

## 2023-03-27 NOTE — TELEPHONE ENCOUNTER
Patient has been having the pain on and off and truly believes it is heartburn.  It is relieved by antacids.  Patient willing to see a different provider if needed.  Is due for annual with Dr Kapadia and was hoping to combine visits  Josseline Esparza RN  [facility]    Reason for Disposition    Chest pain or 'angina' comes and goes and is happening more often (increasing in frequency) or getting worse (increasing in severity) (Exception: chest pains that last only a few seconds)    Additional Information    Negative: SEVERE difficulty breathing (e.g., struggling for each breath, speaks in single words)    Negative: Passed out (i.e., fainted, collapsed and was not responding)    Negative: Difficult to awaken or acting confused (e.g., disoriented, slurred speech)    Negative: Shock suspected (e.g., cold/pale/clammy skin, too weak to stand, low BP, rapid pulse)    Negative: Chest pain lasting longer than 5 minutes and ANY of the following:* Over 44 years old* Over 30 years old and at least one cardiac risk factor (e.g., diabetes mellitus, high blood pressure, high cholesterol, smoker, or strong family history of heart disease)* History of heart disease (i.e., angina, heart attack, heart failure, bypass surgery, takes nitroglycerin)* Pain is crushing, pressure-like, or heavy    Negative: Heart beating < 50 beats per minute OR > 140 beats per minute    Negative: Visible sweat on face or sweat dripping down face    Negative: Sounds like a life-threatening emergency to the triager    Negative: Followed an injury to chest    Negative: SEVERE chest pain    Negative: Pain also in shoulder(s) or arm(s) or jaw    Negative: Difficulty breathing    Negative: Cocaine use within last 3 days    Negative: Major surgery in the past month    Negative: Hip or leg fracture (broken bone) in past month (or had cast on leg or ankle in past month)    Negative: Illness requiring prolonged bedrest in past month (e.g.,  "immobilization, long hospital stay)    Negative: Long-distance travel in past month (e.g., car, bus, train, plane; with trip lasting 6 or more hours)    Negative: History of prior 'blood clot' in leg or lungs (i.e., deep vein thrombosis, pulmonary embolism)    Negative: History of inherited increased risk of blood clots (e.g., Factor 5 Leiden, Anti-thrombin 3, Protein C or Protein S deficiency, Prothrombin mutation)    Negative: Cancer treatment in the past two months (or has cancer now)    Negative: Heart beating irregularly or very rapidly    Negative: Dizziness or lightheadedness    Negative: Coughing up blood    Negative: Patient sounds very sick or weak to the triager    Negative: Chest pain lasting longer than 5 minutes and occurred in last 3 days (72 hours) (Exception: feels exactly the same as previously diagnosed heartburn and has accompanying sour taste in mouth)    Answer Assessment - Initial Assessment Questions  1. LOCATION: \"Where does it hurt?\"        Esophageal area  2. RADIATION: \"Does the pain go anywhere else?\" (e.g., into neck, jaw, arms, back)      no  3. ONSET: \"When did the chest pain begin?\" (Minutes, hours or days)       3 to 4 months  4. PATTERN \"Does the pain come and go, or has it been constant since it started?\"  \"Does it get worse with exertion?\"       Comes and goes, not related to meals, hasn't thought about exertion but hasn't noticed it either  5. DURATION: \"How long does it last\" (e.g., seconds, minutes, hours)      minutes  6. SEVERITY: \"How bad is the pain?\"  (e.g., Scale 1-10; mild, moderate, or severe)     - MILD (1-3): doesn't interfere with normal activities      - MODERATE (4-7): interferes with normal activities or awakens from sleep     - SEVERE (8-10): excruciating pain, unable to do any normal activities        4/10  7. CARDIAC RISK FACTORS: \"Do you have any history of heart problems or risk factors for heart disease?\" (e.g., angina, prior heart attack; diabetes, high " "blood pressure, high cholesterol, smoker, or strong family history of heart disease)      Past smoker  8. PULMONARY RISK FACTORS: \"Do you have any history of lung disease?\"  (e.g., blood clots in lung, asthma, emphysema, birth control pills)      History of asthma  9. CAUSE: \"What do you think is causing the chest pain?\"      Acid reflux, helped with antacids  10. OTHER SYMPTOMS: \"Do you have any other symptoms?\" (e.g., dizziness, nausea, vomiting, sweating, fever, difficulty breathing, cough)        none  11. PREGNANCY: \"Is there any chance you are pregnant?\" \"When was your last menstrual period?\"        none    Protocols used: CHEST PAIN-A-OH      "

## 2023-03-27 NOTE — TELEPHONE ENCOUNTER
I called pt and gave direction as noted below    FIORDALIZA AldanaN RN  [facility]

## 2023-05-10 PROBLEM — S76.312A STRAIN OF LEFT HAMSTRING MUSCLE: Status: RESOLVED | Noted: 2022-12-19 | Resolved: 2023-05-10

## 2023-05-11 ENCOUNTER — OFFICE VISIT (OUTPATIENT)
Dept: FAMILY MEDICINE | Facility: CLINIC | Age: 68
End: 2023-05-11
Payer: COMMERCIAL

## 2023-05-11 VITALS
HEART RATE: 69 BPM | HEIGHT: 64 IN | TEMPERATURE: 97.4 F | RESPIRATION RATE: 16 BRPM | OXYGEN SATURATION: 98 % | DIASTOLIC BLOOD PRESSURE: 70 MMHG | SYSTOLIC BLOOD PRESSURE: 100 MMHG | WEIGHT: 182.01 LBS | BODY MASS INDEX: 31.07 KG/M2

## 2023-05-11 DIAGNOSIS — J45.20 MILD INTERMITTENT ASTHMA WITHOUT COMPLICATION: ICD-10-CM

## 2023-05-11 DIAGNOSIS — Z23 NEED FOR DIPHTHERIA-TETANUS-PERTUSSIS (TDAP) VACCINE: ICD-10-CM

## 2023-05-11 DIAGNOSIS — I25.84 CORONARY ARTERY DISEASE DUE TO CALCIFIED CORONARY LESION: ICD-10-CM

## 2023-05-11 DIAGNOSIS — Z23 NEED FOR SHINGLES VACCINE: ICD-10-CM

## 2023-05-11 DIAGNOSIS — Z82.49 FAMILY HISTORY OF ISCHEMIC HEART DISEASE: ICD-10-CM

## 2023-05-11 DIAGNOSIS — Z83.49 FAMILY HISTORY OF THYROID DISEASE: ICD-10-CM

## 2023-05-11 DIAGNOSIS — Z00.00 ENCOUNTER FOR MEDICARE ANNUAL WELLNESS EXAM: Primary | ICD-10-CM

## 2023-05-11 DIAGNOSIS — Z23 NEED FOR COVID-19 VACCINE: ICD-10-CM

## 2023-05-11 DIAGNOSIS — A60.04 HERPES SIMPLEX VULVOVAGINITIS: ICD-10-CM

## 2023-05-11 DIAGNOSIS — Z23 NEED FOR PNEUMOCOCCAL 20-VALENT CONJUGATE VACCINATION: ICD-10-CM

## 2023-05-11 DIAGNOSIS — I25.10 CORONARY ARTERY DISEASE DUE TO CALCIFIED CORONARY LESION: ICD-10-CM

## 2023-05-11 DIAGNOSIS — R07.9 CHEST PAIN, UNSPECIFIED TYPE: ICD-10-CM

## 2023-05-11 LAB
CHOLEST SERPL-MCNC: 192 MG/DL
CREAT SERPL-MCNC: 0.64 MG/DL (ref 0.51–0.95)
GFR SERPL CREATININE-BSD FRML MDRD: >90 ML/MIN/1.73M2
HDLC SERPL-MCNC: 70 MG/DL
LDLC SERPL CALC-MCNC: 108 MG/DL
NONHDLC SERPL-MCNC: 122 MG/DL
TRIGL SERPL-MCNC: 70 MG/DL
TSH SERPL DL<=0.005 MIU/L-ACNC: 2.47 UIU/ML (ref 0.3–4.2)

## 2023-05-11 PROCEDURE — 82565 ASSAY OF CREATININE: CPT | Performed by: FAMILY MEDICINE

## 2023-05-11 PROCEDURE — 0124A COVID-19 BIVALENT 12+ (PFIZER): CPT | Performed by: FAMILY MEDICINE

## 2023-05-11 PROCEDURE — 36415 COLL VENOUS BLD VENIPUNCTURE: CPT | Performed by: FAMILY MEDICINE

## 2023-05-11 PROCEDURE — G0009 ADMIN PNEUMOCOCCAL VACCINE: HCPCS | Performed by: FAMILY MEDICINE

## 2023-05-11 PROCEDURE — 99214 OFFICE O/P EST MOD 30 MIN: CPT | Mod: 25 | Performed by: FAMILY MEDICINE

## 2023-05-11 PROCEDURE — 91312 COVID-19 BIVALENT 12+ (PFIZER): CPT | Performed by: FAMILY MEDICINE

## 2023-05-11 PROCEDURE — 90677 PCV20 VACCINE IM: CPT | Performed by: FAMILY MEDICINE

## 2023-05-11 PROCEDURE — 80061 LIPID PANEL: CPT | Performed by: FAMILY MEDICINE

## 2023-05-11 PROCEDURE — G0439 PPPS, SUBSEQ VISIT: HCPCS | Performed by: FAMILY MEDICINE

## 2023-05-11 PROCEDURE — 84443 ASSAY THYROID STIM HORMONE: CPT | Performed by: FAMILY MEDICINE

## 2023-05-11 RX ORDER — ACYCLOVIR 400 MG/1
400 TABLET ORAL 2 TIMES DAILY
Qty: 30 TABLET | Refills: 1 | Status: SHIPPED | OUTPATIENT
Start: 2023-05-11

## 2023-05-11 RX ORDER — BUDESONIDE AND FORMOTEROL FUMARATE DIHYDRATE 80; 4.5 UG/1; UG/1
AEROSOL RESPIRATORY (INHALATION)
Qty: 20.4 G | Refills: 11 | Status: SHIPPED | OUTPATIENT
Start: 2023-05-11 | End: 2023-12-07

## 2023-05-11 ASSESSMENT — ENCOUNTER SYMPTOMS
JOINT SWELLING: 0
DIARRHEA: 0
DIZZINESS: 0
HEMATURIA: 0
SHORTNESS OF BREATH: 0
PALPITATIONS: 0
CONSTIPATION: 0
HEARTBURN: 1
EYE PAIN: 0
DYSURIA: 0
ARTHRALGIAS: 0
BREAST MASS: 0
PARESTHESIAS: 0
CHILLS: 0
NERVOUS/ANXIOUS: 0
ABDOMINAL PAIN: 0
SORE THROAT: 0
FEVER: 0
WEAKNESS: 0
HEMATOCHEZIA: 0
COUGH: 1
MYALGIAS: 0
NAUSEA: 0
FREQUENCY: 0

## 2023-05-11 ASSESSMENT — ASTHMA QUESTIONNAIRES
ACT_TOTALSCORE: 21
QUESTION_4 LAST FOUR WEEKS HOW OFTEN HAVE YOU USED YOUR RESCUE INHALER OR NEBULIZER MEDICATION (SUCH AS ALBUTEROL): ONCE A WEEK OR LESS
ACT_TOTALSCORE: 21
QUESTION_3 LAST FOUR WEEKS HOW OFTEN DID YOUR ASTHMA SYMPTOMS (WHEEZING, COUGHING, SHORTNESS OF BREATH, CHEST TIGHTNESS OR PAIN) WAKE YOU UP AT NIGHT OR EARLIER THAN USUAL IN THE MORNING: ONCE OR TWICE
QUESTION_1 LAST FOUR WEEKS HOW MUCH OF THE TIME DID YOUR ASTHMA KEEP YOU FROM GETTING AS MUCH DONE AT WORK, SCHOOL OR AT HOME: NONE OF THE TIME
QUESTION_5 LAST FOUR WEEKS HOW WOULD YOU RATE YOUR ASTHMA CONTROL: WELL CONTROLLED
QUESTION_2 LAST FOUR WEEKS HOW OFTEN HAVE YOU HAD SHORTNESS OF BREATH: ONCE OR TWICE A WEEK

## 2023-05-11 ASSESSMENT — PAIN SCALES - GENERAL: PAINLEVEL: NO PAIN (0)

## 2023-05-11 ASSESSMENT — ACTIVITIES OF DAILY LIVING (ADL): CURRENT_FUNCTION: NO ASSISTANCE NEEDED

## 2023-05-11 NOTE — NURSING NOTE
Prior to immunization administration, verified patients identity using patient s name and date of birth. Please see Immunization Activity for additional information.     Screening Questionnaire for Adult Immunization    Are you sick today?   No   Do you have allergies to medications, food, a vaccine component or latex?   No   Have you ever had a serious reaction after receiving a vaccination?   No   Do you have a long-term health problem with heart, lung, kidney, or metabolic disease (e.g., diabetes), asthma, a blood disorder, no spleen, complement component deficiency, a cochlear implant, or a spinal fluid leak?  Are you on long-term aspirin therapy?   No   Do you have cancer, leukemia, HIV/AIDS, or any other immune system problem?   No   Do you have a parent, brother, or sister with an immune system problem?   No   In the past 3 months, have you taken medications that affect  your immune system, such as prednisone, other steroids, or anticancer drugs; drugs for the treatment of rheumatoid arthritis, Crohn s disease, or psoriasis; or have you had radiation treatments?   No   Have you had a seizure, or a brain or other nervous system problem?   No   During the past year, have you received a transfusion of blood or blood    products, or been given immune (gamma) globulin or antiviral drug?   No   For women: Are you pregnant or is there a chance you could become       pregnant during the next month?   No   Have you received any vaccinations in the past 4 weeks?   No     Immunization questionnaire answers were all negative.      Injection of pfizer bivalent & Prevnar 20 given by Marva Gonzalez. Patient instructed to remain in clinic for 15 minutes afterwards, and to report any adverse reactions.     Screening performed by Marva Gonzalez on 5/11/2023 at 10:23 AM.

## 2023-05-11 NOTE — PROGRESS NOTES
"SUBJECTIVE:   Valorie is a 67 year old who presents for Preventive Visit.      5/11/2023     9:20 AM   Additional Questions   Roomed by Ginny   Patient has been advised of split billing requirements and indicates understanding: Yes  Are you in the first 12 months of your Medicare coverage?  No    Healthy Habits:     In general, how would you rate your overall health?  Good    Frequency of exercise:  2-3 days/week    Duration of exercise:  Greater than 60 minutes    Do you usually eat at least 4 servings of fruit and vegetables a day, include whole grains    & fiber and avoid regularly eating high fat or \"junk\" foods?  Yes    Taking medications regularly:  Yes    Medication side effects:  Not applicable    Ability to successfully perform activities of daily living:  No assistance needed    Home Safety:  No safety concerns identified    Hearing Impairment:  No hearing concerns    In the past 6 months, have you been bothered by leaking of urine?  No    In general, how would you rate your overall mental or emotional health?  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  Is year round in the morning after wakes up.  Also - feels gets into a spasm.  Got diagnosed with hiatal hernia many years ago.  Feels tightness in her chest on right side, not with exercise.    Cardiac health.  Mom had a few heart attacks, dad had his first major one at 45.    Had a stress test once a long time ago.    Smoked in 20s and early 30s, none since.        Have you ever done Advance Care Planning? (For example, a Health Directive, POLST, or a discussion with a medical provider or your loved ones about your wishes): Yes, patient states has an Advance Care Planning document and will bring a copy to the clinic.       Fall risk  Fallen 2 or more times in the past year?: No  Any fall with injury in the past year?: No  click delete button " to remove this line now  Cognitive Screening   1) Repeat 3 items (Leader, Season, Table)    2) Clock draw: NORMAL  3) 3 item recall: Recalls 3 objects  Results: 3 items recalled: COGNITIVE IMPAIRMENT LESS LIKELY    Mini-CogTM Copyright S Barrera. Licensed by the author for use in [facility]; reprinted with permission (samantha@[email or web address]). All rights reserved.      Do you have sleep apnea, excessive snoring or daytime drowsiness?: no    Reviewed and updated as needed this visit by clinical staff   Tobacco  Allergies  Meds  Problems  Med Hx  Surg Hx  Fam Hx          Reviewed and updated as needed this visit by Provider   Tobacco  Allergies  Meds  Problems  Med Hx  Surg Hx  Fam Hx         Social History     Tobacco Use     Smoking status: Former     Packs/day: 0.50     Years: 10.00     Pack years: 5.00     Types: Cigarettes     Start date: 1990     Quit date: 2000     Years since quittin.9     Smokeless tobacco: Never   Vaping Use     Vaping status: Not on file   Substance Use Topics     Alcohol use: Yes     Comment: 1-2 glasses of wine per night             2023     9:09 AM   Alcohol Use   Prescreen: >3 drinks/day or >7 drinks/week? No     Do you have a current opioid prescription? No  Do you use any other controlled substances or medications that are not prescribed by a provider? None      Current providers sharing in care for this patient include:   Patient Care Team:  Anjali Kapadia MD as PCP - General (Family Medicine)  Saul Lu MD as MD (Family Medicine - Sports Medicine)  Joan Sher MD as Referring Physician (Family Practice)  Sanjay Plummer MD as MD (Cardiology)  Amy Winter MD as MD (Cardiology)  [facility]  Belkis Ku OD as Physician (Optometry)  Danna Garcia MD as MD (Ophthalmology)  Priya Tabares MD as Assigned Surgical Provider  Yi Obrien MD as Referring Physician  (Pulmonary Disease)  Sheron Gunter MD as Referring Physician (Internal Medicine)  Anjali Kapadia MD as Assigned PCP  Aaron Hamlin MD as MD (Otolaryngology)  Anjali Kapadia MD as MD (Family Medicine)  Alex Byrne DO as Assigned Musculoskeletal Provider    The following health maintenance items are reviewed in Epic and correct as of today:  Health Maintenance   Topic Date Due     ZOSTER IMMUNIZATION (2 of 2) 03/26/2021     DTAP/TDAP/TD IMMUNIZATION (2 - Td or Tdap) 04/27/2022     TSH W/FREE T4 REFLEX  05/11/2023     ASTHMA ACTION PLAN  05/01/2031 (Originally 8/2/2018)     ASTHMA CONTROL TEST  11/11/2023     MAMMO SCREENING  12/05/2023     COLORECTAL CANCER SCREENING  01/03/2024     MEDICARE ANNUAL WELLNESS VISIT  05/11/2024     ANNUAL REVIEW OF HM ORDERS  05/11/2024     FALL RISK ASSESSMENT  05/11/2024     LIPID  01/29/2026     DEXA  05/31/2027     ADVANCE CARE PLANNING  05/11/2028     HEPATITIS C SCREENING  Completed     PHQ-2 (once per calendar year)  Completed     INFLUENZA VACCINE  Completed     Pneumococcal Vaccine: 65+ Years  Completed     COVID-19 Vaccine  Completed     IPV IMMUNIZATION  Aged Out     MENINGITIS IMMUNIZATION  Aged Out     PAP  Discontinued               1/28/2021     2:51 PM   Breast CA Risk Assessment (FHS-7)   Do you have a family history of breast, colon, or ovarian cancer? No / Unknown         Mammogram Screening: Recommended mammography every 1-2 years with patient discussion and risk factor consideration  Pertinent mammograms are reviewed under the imaging tab.    Review of Systems   Constitutional: Negative for chills and fever.   HENT: Negative for congestion, ear pain, hearing loss and sore throat.    Eyes: Negative for pain and visual disturbance.   Respiratory: Positive for cough. Negative for shortness of breath.    Cardiovascular: Negative for chest pain, palpitations and peripheral edema.   Gastrointestinal: Positive for heartburn. Negative for abdominal  "pain, constipation, diarrhea, hematochezia and nausea.   Breasts:  Negative for tenderness, breast mass and discharge.   Genitourinary: Negative for dysuria, frequency, genital sores, hematuria, pelvic pain, urgency, vaginal bleeding and vaginal discharge.   Musculoskeletal: Negative for arthralgias, joint swelling and myalgias.   Skin: Negative for rash.   Neurological: Negative for dizziness, weakness and paresthesias.   Psychiatric/Behavioral: Negative for mood changes. The patient is not nervous/anxious.          OBJECTIVE:   /70 (BP Location: Right arm, Patient Position: Sitting, Cuff Size: Adult Large)   Pulse 69   Temp 97.4  F (36.3  C) (Temporal)   Resp 16   Ht 1.626 m (5' 4\")   Wt 82.6 kg (182 lb 0.2 oz)   LMP 02/14/2010   SpO2 98%   BMI 31.24 kg/m   Estimated body mass index is 31.24 kg/m  as calculated from the following:    Height as of this encounter: 1.626 m (5' 4\").    Weight as of this encounter: 82.6 kg (182 lb 0.2 oz).  Physical Exam      Diagnostic Test Results:  Labs reviewed in Epic    ASSESSMENT / PLAN:   Valorie was seen today for physical.    Diagnoses and all orders for this visit:    Encounter for Medicare annual wellness exam    Mammo: up to date     Colon: up to date     Immunizations: discussed - will do Shingrix and TDAP and pharmacy, and PCV20 and Covid today here    Discussed healthy lifestyle and aging recommendations including regular exercise, adequate and regular sleep, 5+ fruits and veggies daily.  -     PRIMARY CARE FOLLOW-UP SCHEDULING; Future  -     Adult Dermatology Referral; Future    Need for shingles vaccine  -     zoster vaccine recombinant adjuvanted (SHINGRIX) injection; Inject 0.5 mLs into the muscle once for 1 dose Pharmacist administered    Need for diphtheria-tetanus-pertussis (Tdap) vaccine  -     Tdap, tetanus-diptheria-acell pertussis, (BOOSTRIX) 5-2.5-18.5 LF-MCG/0.5 DOMINIC injection; Inject 0.5 mLs into the muscle once for 1 dose    Family history " "of thyroid disease  Comments:  Check TSH today and yearly  Orders:  -     TSH WITH FREE T4 REFLEX; Future    Family history of ischemic heart disease  -     Lipid panel reflex to direct LDL Fasting; Future    Chest pain, unspecified type  Comments:    Not with exercise, but does have strong fam hx of CAD    Will get stress test as well as coronary calcium CT to assess calcification risk    Ordered today  Orders:  -     Exercise Stress Test - Adult; Future  -     CT Coronary Calcium Scan; Future    Mild intermittent asthma without complication  Comments:    with morning cough, normal lung exam and normal chest CT 2021    first, will trial netipot nightly    If this doesn't help, trial symbicort daily and prn.  Would prefer to avoid allergy medications as they dry eyes too much    Orders:  -     budesonide-formoterol (SYMBICORT) 80-4.5 MCG/ACT Inhaler; Inhale 1-2 puffs as needed. May use up to 12 puffs per day.    Herpes simplex vulvovaginitis  Comments:  Acyclovir, refilled  Stable  Orders:  -     acyclovir (ZOVIRAX) 400 MG tablet; Take 1 tablet (400 mg) by mouth 2 times daily For 3 days with each outbreak.  -     Creatinine; Future    Other orders  -     REVIEW OF HEALTH MAINTENANCE PROTOCOL ORDERS  -     Pneumococcal 20 Valent Conjugate (PCV20)  -     COVID-19 BIVALENT 12+ (PFIZER)        Patient has been advised of split billing requirements and indicates understanding: Yes      COUNSELING:  Reviewed preventive health counseling, as reflected in patient instructions      BMI:   Estimated body mass index is 31.24 kg/m  as calculated from the following:    Height as of this encounter: 1.626 m (5' 4\").    Weight as of this encounter: 82.6 kg (182 lb 0.2 oz).   Weight management plan: Discussed healthy diet and exercise guidelines      She reports that she quit smoking about 22 years ago. Her smoking use included cigarettes. She started smoking about 33 years ago. She has a 5.00 pack-year smoking history. She has " never used smokeless tobacco.      Appropriate preventive services were discussed with this patient, including applicable screening as appropriate for cardiovascular disease, diabetes, osteopenia/osteoporosis, and glaucoma.  As appropriate for age/gender, discussed screening for colorectal cancer, prostate cancer, breast cancer, and cervical cancer. Checklist reviewing preventive services available has been given to the patient.    Reviewed patients plan of care and provided an AVS. The Basic Care Plan (routine screening as documented in Health Maintenance) for Valorie meets the Care Plan requirement. This Care Plan has been established and reviewed with the Patient.      Anjali Kapadia MD  [facility]    Identified Health Risks:    I have reviewed Opioid Use Disorder and Substance Use Disorder risk factors and made any needed referrals.

## 2023-05-11 NOTE — PATIENT INSTRUCTIONS
Patient Education   Personalized Prevention Plan  You are due for the preventive services outlined below.  Your care team is available to assist you in scheduling these services.  If you have already completed any of these items, please share that information with your care team to update in your medical record.  Health Maintenance Due   Topic Date Due    Zoster (Shingles) Vaccine (2 of 2) 03/26/2021    Diptheria Tetanus Pertussis (DTAP/TDAP/TD) Vaccine (2 - Td or Tdap) 04/27/2022    Thyroid Function Lab  05/11/2023

## 2023-05-17 ENCOUNTER — HOSPITAL ENCOUNTER (OUTPATIENT)
Dept: CARDIOLOGY | Facility: CLINIC | Age: 68
Discharge: HOME | End: 2023-05-17
Attending: FAMILY MEDICINE | Admitting: FAMILY MEDICINE
Payer: COMMERCIAL

## 2023-05-17 DIAGNOSIS — R07.9 CHEST PAIN, UNSPECIFIED TYPE: ICD-10-CM

## 2023-05-17 PROCEDURE — 93018 CV STRESS TEST I&R ONLY: CPT | Performed by: STUDENT IN AN ORGANIZED HEALTH CARE EDUCATION/TRAINING PROGRAM

## 2023-05-17 PROCEDURE — 93017 CV STRESS TEST TRACING ONLY: CPT

## 2023-05-17 PROCEDURE — 93016 CV STRESS TEST SUPVJ ONLY: CPT | Performed by: STUDENT IN AN ORGANIZED HEALTH CARE EDUCATION/TRAINING PROGRAM

## 2023-05-22 ENCOUNTER — HOSPITAL ENCOUNTER (OUTPATIENT)
Dept: CT IMAGING | Facility: CLINIC | Age: 68
Discharge: HOME | End: 2023-05-22
Attending: FAMILY MEDICINE | Admitting: FAMILY MEDICINE
Payer: COMMERCIAL

## 2023-05-22 DIAGNOSIS — A60.04 HERPES SIMPLEX VULVOVAGINITIS: ICD-10-CM

## 2023-05-22 DIAGNOSIS — R07.9 CHEST PAIN, UNSPECIFIED TYPE: ICD-10-CM

## 2023-05-22 LAB — RADIOLOGIST FLAGS: NORMAL

## 2023-05-22 PROCEDURE — 75571 CT HRT W/O DYE W/CA TEST: CPT

## 2023-05-22 PROCEDURE — 75571 CT HRT W/O DYE W/CA TEST: CPT | Mod: 26 | Performed by: STUDENT IN AN ORGANIZED HEALTH CARE EDUCATION/TRAINING PROGRAM

## 2023-05-23 ENCOUNTER — MYC MEDICAL ADVICE (OUTPATIENT)
Dept: FAMILY MEDICINE | Facility: CLINIC | Age: 68
End: 2023-05-23
Payer: COMMERCIAL

## 2023-05-23 DIAGNOSIS — R91.8 PULMONARY NODULES: Primary | ICD-10-CM

## 2023-05-23 DIAGNOSIS — K22.89 ESOPHAGEAL THICKENING: ICD-10-CM

## 2023-05-23 PROBLEM — I25.84 CORONARY ARTERY DISEASE DUE TO CALCIFIED CORONARY LESION: Status: ACTIVE | Noted: 2023-05-23

## 2023-05-23 PROBLEM — I25.10 CORONARY ARTERY DISEASE DUE TO CALCIFIED CORONARY LESION: Status: ACTIVE | Noted: 2023-05-23

## 2023-05-23 RX ORDER — ROSUVASTATIN CALCIUM 10 MG/1
10 TABLET, COATED ORAL DAILY
Qty: 90 TABLET | Refills: 3 | Status: ON HOLD | OUTPATIENT
Start: 2023-05-23 | End: 2023-06-22

## 2023-05-24 DIAGNOSIS — A60.04 HERPES SIMPLEX VULVOVAGINITIS: ICD-10-CM

## 2023-05-24 RX ORDER — ACYCLOVIR 400 MG/1
400 TABLET ORAL 2 TIMES DAILY
Qty: 30 TABLET | Refills: 1 | OUTPATIENT
Start: 2023-05-24

## 2023-05-24 NOTE — TELEPHONE ENCOUNTER
Should have refills.         Disp Refills Start End LETICIA   acyclovir (ZOVIRAX) 400 MG tablet 30 tablet 1 5/11/2023  No   Sig - Route: Take 1 tablet (400 mg) by mouth 2 times daily For 3 days with each outbreak. - Oral

## 2023-05-25 RX ORDER — ACYCLOVIR 400 MG/1
400 TABLET ORAL 2 TIMES DAILY
Qty: 30 TABLET | Refills: 1 | OUTPATIENT
Start: 2023-05-25

## 2023-05-25 NOTE — TELEPHONE ENCOUNTER
.\Message sent to pharmacy - Refusal reason: Duplicate ([facility] for dispense of 30 and one ref).  Navin MARTINEZ

## 2023-06-02 ENCOUNTER — TELEPHONE (OUTPATIENT)
Dept: GASTROENTEROLOGY | Facility: CLINIC | Age: 68
End: 2023-06-02
Payer: COMMERCIAL

## 2023-06-02 NOTE — TELEPHONE ENCOUNTER
Screening Questions  BLUE  KIND OF PREP RED  LOCATION [review exclusion criteria] GREEN  SEDATION TYPE        y Are you active on mychart?       [place]ang Ordering/Referring Provider?        [employer] What type of coverage do you have?      n Have you had a positive covid test in the last 14 days?     30.9 1. BMI  [BMI 40+ - review exclusion criteria& smart-phrase document]    y  2. Are you able to give consent for your medical care? [IF NO,RN REVIEW]          n  3. Are you taking any prescription pain medications on a routine schedule   (ex narcotics: oxycodone, roxicodone, oxycontin,  and percocet)? [RN Review]        n  3a. EXTENDED PREP What kind of prescription?     n 4. Do you have any chemical dependencies such as alcohol, street drugs, or methadone?        **If yes 3- 5 , please schedule with MAC sedation.**          IF YES TO ANY 6 - 10 - HOSPITAL SETTING ONLY.     n 6.   Do you need assistance transferring?     n 7.   Have you had a heart or lung transplant?    n 8.   Are you currently on dialysis?   n 9.   Do you use daily home oxygen?   n 10. Do you take nitroglycerin?   10a. n If yes, how often?     n 11. Are you currently pregnant?    11a. n If yes, how many weeks? [ Greater than 12 weeks, OR NEEDED]    n 12. Do you have Pulmonary Hypertension? *NEED PAC APPT AT [facility] w/ [facility]*     n 13. [review exclusion criteria]  Do you have any implantable devices in your body (pacemaker, defib, LVAD)?    n 14. In the past 6 months, have you had any heart related issues including cardiomyopathy or heart attack?     14a. n If yes, did it require cardiac stenting if so when?     n 15. Have you had a stroke or Transient ischemic attack (TIA - aka  mini stroke ) within 6 months?      n 16. Do you have mod to severe Obstructive Sleep Apnea?  [Hospital only]    n 17. Do you have SEVERE AND UNCONTROLLED asthma? *NEED PAC APPT AT [facility] w/[facility]*     18.Do you take blood thinners?  No    n 19. Do you take any of the following  "medications?    Phentermine    Ozempic    Wegovy (Semaglutide)      19a. If yes, \"Hold for 7 days before procedure.  Please consult your prescribing provider if you have questions about holding this medication.\"     n  20. Do you have chronic kidney disease?      n  21. Do you have a diagnosis of diabetes?       22. On a regular basis do you go 3-5 days between bowel movements?      23. Preferred LOCAL Pharmacy for Pre Prescription         [id number] - [place], [place] - [address] AT [facility] [address]  [id number] - [place], [place] - [address]        - CLOSING REMINDERS -    You will receive a call from a Nurse to review instructions and health history.  This assessment must be completed prior to your procedure.  Failure to complete the Nurse assessment may result in the procedure being cancelled.      On the day of your procedure, please designatean adult(s) who can drive you home stay with you for the next 24 hours. The medicines used in the exam will make you sleepy. You will not be able to drive.      You cannot take public transportation, ride share services, or non-medical taxi service without a responsible caregiver.  Medical transport services are allowed with the requirement that a responsible caregiver will receive you at your destination.  We require that drivers and caregivers are confirmed prior to your procedure.      - SCHEDULING DETAILS -  n &  Hospital Setting Required & If yes, what is the exclusion?   Janice  Surgeon    6/22  Date of Procedure  Upper Endoscopy [EGD]  [facility] Sed[facility] Type     n PAC / Pre-op Required                  "

## 2023-06-22 ENCOUNTER — HOSPITAL ENCOUNTER (OUTPATIENT)
Facility: CLINIC | Age: 68
Discharge: HOME | End: 2023-06-22
Attending: INTERNAL MEDICINE | Admitting: INTERNAL MEDICINE
Payer: COMMERCIAL

## 2023-06-22 VITALS
HEART RATE: 63 BPM | HEIGHT: 64 IN | OXYGEN SATURATION: 99 % | SYSTOLIC BLOOD PRESSURE: 121 MMHG | BODY MASS INDEX: 30.73 KG/M2 | DIASTOLIC BLOOD PRESSURE: 80 MMHG | RESPIRATION RATE: 22 BRPM | WEIGHT: 180 LBS

## 2023-06-22 DIAGNOSIS — K22.89 ESOPHAGEAL THICKENING: Primary | ICD-10-CM

## 2023-06-22 LAB — UPPER GI ENDOSCOPY: NORMAL

## 2023-06-22 PROCEDURE — 88305 TISSUE EXAM BY PATHOLOGIST: CPT | Mod: 26 | Performed by: PATHOLOGY

## 2023-06-22 PROCEDURE — 88305 TISSUE EXAM BY PATHOLOGIST: CPT | Mod: TC | Performed by: INTERNAL MEDICINE

## 2023-06-22 PROCEDURE — 999N000099 HC STATISTIC MODERATE SEDATION < 10 MIN: Performed by: INTERNAL MEDICINE

## 2023-06-22 PROCEDURE — 88312 SPECIAL STAINS GROUP 1: CPT | Mod: 26 | Performed by: PATHOLOGY

## 2023-06-22 PROCEDURE — 43239 EGD BIOPSY SINGLE/MULTIPLE: CPT | Performed by: INTERNAL MEDICINE

## 2023-06-22 PROCEDURE — 250N000011 HC RX IP 250 OP 636: Performed by: INTERNAL MEDICINE

## 2023-06-22 RX ORDER — FENTANYL CITRATE 50 UG/ML
INJECTION, SOLUTION INTRAMUSCULAR; INTRAVENOUS PRN
Status: DISCONTINUED | OUTPATIENT
Start: 2023-06-22 | End: 2023-06-22 | Stop reason: HOSPADM

## 2023-06-22 RX ORDER — PANTOPRAZOLE SODIUM 40 MG/1
40 TABLET, DELAYED RELEASE ORAL DAILY
Qty: 30 TABLET | Refills: 3 | Status: SHIPPED | OUTPATIENT
Start: 2023-06-22 | End: 2024-06-20

## 2023-06-22 ASSESSMENT — ACTIVITIES OF DAILY LIVING (ADL): ADLS_ACUITY_SCORE: 35

## 2023-06-22 NOTE — H&P
[facility]  Pre-Endoscopy History and Physical     Valorie Hahn MRN# [id number]   YOB: 1955 Age: 67 year old     Date of Procedure: 6/22/2023  Primary care provider: Anjali Kapadia  Type of Endoscopy: esophagogastroduodenoscopy (upper GI endoscopy)  Reason for Procedure: GERD, abd, pain  Type of Anesthesia Anticipated: Moderate Sedation    HPI:    Valorie is a 67 year old female who will be undergoing the above procedure.      A history and physical has been performed. The patient's medications and allergies have been reviewed. The risks and benefits of the procedure and the sedation options and risks were discussed with the patient.  All questions were answered and informed consent was obtained.      She denies a personal or family history of anesthesia complications or bleeding disorders.     Allergies   Allergen Reactions     Penicillin G Hives        Prior to Admission Medications   Prescriptions Last Dose Informant Patient Reported? Taking?   MELOXICAM PO 6/15/2023  Yes Yes   acyclovir (ZOVIRAX) 400 MG tablet More than a month  No Yes   Sig: Take 1 tablet (400 mg) by mouth 2 times daily For 3 days with each outbreak.   albuterol (PROAIR HFA/PROVENTIL HFA/VENTOLIN HFA) 108 (90 Base) MCG/ACT inhaler More than a month  No Yes   Sig: Inhale 2 puffs into the lungs every 6 hours as needed for shortness of breath / dyspnea or wheezing   budesonide-formoterol (SYMBICORT) 80-4.5 MCG/ACT Inhaler More than a month  No Yes   Sig: Inhale 1-2 puffs as needed. May use up to 12 puffs per day.   diclofenac (VOLTAREN) 1 % topical gel   No No   Sig: Apply 4 g topically 4 times daily as needed for moderate pain (4-6)      Facility-Administered Medications: None       Patient Active Problem List   Diagnosis     Herpes simplex vulvovaginitis     Mild intermittent asthma without complication     Family history of thyroid disease     Advanced directives, counseling/discussion      SI (sacroiliac) joint dysfunction     Osteitis, condensans     Sacroiliac joint disease     SI (sacroiliac) pain     Colon polyps     Dyspnea     Posterior vitreous detachment     CME (cystoid macular edema), right     Cystoid macular edema, left eye     Finger mass, right     Intermediate uveitis of both eyes     Pseudophakia of both eyes     History of 2019 novel coronavirus disease (COVID-19)     Osteopenia of multiple sites     Coronary artery disease due to calcified coronary lesion     Pulmonary nodules     Esophageal thickening        Past Medical History:   Diagnosis Date     Arthritis 2012    SI joint, osteoarthritis     Complication of anesthesia      Coronary artery disease due to calcified coronary lesion 05/23/2023     Esophageal reflux      Genital herpes, unspecified      History of blood transfusion      Kidney stone 09/2019     Mild intermittent asthma with exacerbation      PONV (postoperative nausea and vomiting)         Past Surgical History:   Procedure Laterality Date     CATARACT IOL, RT/LT       COLONOSCOPY      some polyps removed     COLONOSCOPY WITH CO2 INSUFFLATION N/A 1/3/2019    Procedure: COLONOSCOPY WITH CO2 INSUFFLATION;  Surgeon: Arnold Adler MD;  Location: [facility] OR     EXCISE MASS FINGER Right 12/12/2019    Procedure: RIght ring  finger mass excision;  Surgeon: Karel Valle MD;  Location: [facility] OR     HC TOOTH EXTRACTION W/FORCEP       PHACOEMULSIFICATION WITH STANDARD INTRAOCULAR LENS IMPLANT Left 3/1/2019    Procedure: Left Eye Cataract Removal with Intraocular Lens Implant;  Surgeon: Danna Garcia MD;  Location: [facility] OR     PHACOEMULSIFICATION WITH STANDARD INTRAOCULAR LENS IMPLANT Right 3/8/2019    Procedure: Right Eye Cataract Removal with Intraocular Lens Implant;  Surgeon: Danna Garcia MD;  Location: [facility] OR     ZZC APPENDECTOMY       ZZC NONSPECIFIC PROCEDURE      Anal fissure repair       Social History     Tobacco Use     Smoking status: Former     " Packs/day: 0.50     Years: 10.00     Pack years: 5.00     Types: Cigarettes     Start date: 1990     Quit date: 2000     Years since quittin.0     Smokeless tobacco: Never   Substance Use Topics     Alcohol use: Yes     Comment: 1-2 glasses of wine per night       Family History   Problem Relation Age of Onset     Respiratory Mother         emphysema     Mental Illness Mother      Thyroid Disease Mother      Thyroid Disease Mother         hypothyroid, takes supplement     Cerebrovascular Disease Mother      Depression Mother      Mental Illness Mother      Glaucoma Mother      Mental Illness Maternal Grandmother      Depression Maternal Grandmother      Diabetes Father         type 2     Heart Disease Father         bypass surgeries x 2     Other Cancer Father         non hodgkins     Skin Cancer Father      Thyroid Disease Sister         hypothyroid     Thyroid Disease Sister         hypothyroid     Psychotic Disorder Sister         commited suicide, depression     Cancer Brother         esophogeal cancer, work exposure to chemicals     Blood Disease Brother          hiv  aids     Blood Disease Brother         hep c     Blood Disease Brother         hiv positive, passed away     Family History Negative Brother      Diabetes Brother      Mental Illness Sister      Thyroid Disease Sister      Thyroid Disease Sister      Diabetes Brother         type 2     Depression Sister      Depression Sister      Depression Brother      Melanoma No family hx of      Macular Degeneration No family hx of        REVIEW OF SYSTEMS:     5 point ROS negative except as noted above in HPI, including Gen., Resp., CV, GI &  system review.      PHYSICAL EXAM:   /74   Pulse 68   Resp 14   Ht 1.626 m (5' 4\")   Wt 81.6 kg (180 lb)   LMP 2010   SpO2 96%   BMI 30.90 kg/m   Estimated body mass index is 30.9 kg/m  as calculated from the following:    Height as of this encounter: 1.626 m (5' 4\").    Weight as " of this encounter: 81.6 kg (180 lb).   GENERAL APPEARANCE: healthy, alert and no distress  MENTAL STATUS: alert  AIRWAY EXAM: Mallampatti Class I (visualization of the soft palate, fauces, uvula, anterior and posterior pillars)  RESP: lungs clear to auscultation - no rales, rhonchi or wheezes  CV: normal S1 S2, no S3 or S4      DIAGNOSTICS:    Not indicated      IMPRESSION   ASA Class 2 - Mild systemic disease        PLAN:       Plan for esophagogastroduodenoscopy (upper GI endoscopy). We discussed the risks, benefits and alternatives and the patient wished to proceed.    The above has been forwarded to the consulting provider.      Signed Electronically by: Wolf Camacho MD,MD  June 22, 2023      [facility]  Ph: [phone] Fax: [phone]

## 2023-06-26 LAB
PATH REPORT.COMMENTS IMP SPEC: NORMAL
PATH REPORT.FINAL DX SPEC: NORMAL
PATH REPORT.GROSS SPEC: NORMAL
PATH REPORT.MICROSCOPIC SPEC OTHER STN: NORMAL
PATH REPORT.MICROSCOPIC SPEC OTHER STN: NORMAL
PATH REPORT.RELEVANT HX SPEC: NORMAL
PHOTO IMAGE: NORMAL

## 2023-09-13 ENCOUNTER — THERAPY VISIT (OUTPATIENT)
Dept: PHYSICAL THERAPY | Facility: CLINIC | Age: 68
End: 2023-09-13
Payer: COMMERCIAL

## 2023-09-13 DIAGNOSIS — S76.312D STRAIN OF LEFT HAMSTRING MUSCLE, SUBSEQUENT ENCOUNTER: Primary | ICD-10-CM

## 2023-09-13 PROBLEM — S76.319A HAMSTRING MUSCLE STRAIN: Status: ACTIVE | Noted: 2023-09-13

## 2023-09-13 PROCEDURE — 97110 THERAPEUTIC EXERCISES: CPT | Mod: GP | Performed by: STUDENT IN AN ORGANIZED HEALTH CARE EDUCATION/TRAINING PROGRAM

## 2023-09-13 PROCEDURE — 97140 MANUAL THERAPY 1/> REGIONS: CPT | Mod: GP | Performed by: STUDENT IN AN ORGANIZED HEALTH CARE EDUCATION/TRAINING PROGRAM

## 2023-09-13 PROCEDURE — 97161 PT EVAL LOW COMPLEX 20 MIN: CPT | Mod: GP | Performed by: STUDENT IN AN ORGANIZED HEALTH CARE EDUCATION/TRAINING PROGRAM

## 2023-09-13 NOTE — PROGRESS NOTES
PHYSICAL THERAPY EVALUATION  Type of Visit: Evaluation    See electronic medical record for Abuse and Falls Screening details.    Subjective       Presenting condition or subjective complaint: Hamstring/thigh pain    Pt reports last November she was walking and her left knee seized up and she pulled her hamstring. She had PT last winter for 8 weeks with good improvements until she was biking this summer up high hills and re-injured her left hamstring. Bending forward when gardening, biking uphill, and higher level exercise/activity causes pain. Pain in aching and is much lower in level than when she initially injured it again this summer. She is stiff and sore in the morning which reduces when she starts walking. Denies any pain in her knee/ankle/hip.      Date of onset: 12/08/22    Relevant medical history:     Dates & types of surgery:      Prior diagnostic imaging/testing results: X-ray     Prior therapy history for the same diagnosis, illness or injury: Yes 2 months of PT this past winter    Prior Level of Function  Transfers: Independent  Ambulation: Independent  ADL: Independent    Living Environment  Social support: With a significant other or spouse   Type of home: 2-story   Stairs to enter the home: Yes       Ramp: No   Stairs inside the home: Yes       Help at home: None  Equipment owned:       Employment: No    Hobbies/Interests: biking, hiking, gardening    Patient goals for therapy: Bike uphill       Objective       KNEE EVALUATION    Static Posture: normal posture bilat    Dynamic Movement Screen  Single leg stance observations: mild balance difficulty on L  Double limb squat observations: ant knee translation bilat, fwd weight shift     Range of Motion      Knee PROM Extension Flexion   Left WNL WNL   Right WNL WNL     Knee AROM Extension Flexion   Left WNL WNL with discomfort    Right WNL WNL       Left            Right  Hamstring ROM 10 deg in hip 90/90 5 deg in hip 90/90    Hip and Knee  Strength   MMT Hip Abduction Hip Extension Hip ER Knee Flexion   Left 4/5 4/5 4+/5 4+/5 with pain   Right 4+/5 5/5 4+/5 5/5       Knee ligaments and meniscus screen: WNL    Patellofemoral screen: sightly hypomobile in all directions bilat    PALPATION  Left: mid medial hamstring tenderness without deformity present   Right: WNL      Please refer to the daily flowsheet for treatment today, total treatment time and time spent performing 1:1 timed codes.     Assessment & Plan   CLINICAL IMPRESSIONS  Medical Diagnosis: Strain of left hamstring muscle    Treatment Diagnosis: strain of left hamstring muscle, weakness   Impression/Assessment: Patient is a 67 year old female with hamstring pain complaints.  The following significant findings have been identified: Pain, Decreased strength, and Decreased activity tolerance. These impairments interfere with their ability to perform recreational activities and community mobility as compared to previous level of function.     Clinical Decision Making (Complexity):  Clinical Presentation: Stable/Uncomplicated  Clinical Presentation Rationale: based on medical and personal factors listed in PT evaluation  Clinical Decision Making (Complexity): Low complexity    PLAN OF CARE  Treatment Interventions:  Interventions: Gait Training, Manual Therapy, Neuromuscular Re-education, Therapeutic Activity, Therapeutic Exercise    Long Term Goals     PT Goal 1  Goal Identifier: activity  Goal Description: pt will bike for 30 minutes with hills without pain  Rationale: to maximize safety and independence with performance of ADLs and functional tasks  Target Date: 11/08/23  PT Goal 2  Goal Identifier: strength  Goal Description: pt will improve hip and knee strength to 5/5 without pain  Rationale: to maximize safety and independence with performance of ADLs and functional tasks;to maximize safety and independence within the community;to maximize safety and independence within the home  Target  Date: 11/08/23      Frequency of Treatment: 1x/week  Duration of Treatment: 8 weeks    Education Assessment:        Risks and benefits of evaluation/treatment have been explained.   Patient/Family/caregiver agrees with Plan of Care.     Evaluation Time:     PT Eval, Low Complexity Minutes (68241): 15     Signing Clinician: MICHELE ROSA [facility]                                                                                   OUTPATIENT PHYSICAL THERAPY      PLAN OF TREATMENT FOR OUTPATIENT REHABILITATION   Patient's Last Name, First Name, Valorie Hazel YOB: 1955   Provider's Name   [facility]   Medical Record No.  [id number]     Onset Date: 12/08/22  Start of Care Date: 09/13/23     Medical Diagnosis:  Strain of left hamstring muscle      PT Treatment Diagnosis:  strain of left hamstring muscle, weakness Plan of Treatment  Frequency/Duration: 1x/week/ 8 weeks    Certification date from 09/13/23 to 11/08/23         See note for plan of treatment details and functional goals     FAVIO STALEY PT                         I CERTIFY THE NEED FOR THESE SERVICES FURNISHED UNDER        THIS PLAN OF TREATMENT AND WHILE UNDER MY CARE     (Physician attestation of this document indicates review and certification of the therapy plan).                Referring Provider:  Alex Byrne      Initial Assessment  See Epic Evaluation- Start of Care Date: 09/13/23

## 2023-10-24 ENCOUNTER — MYC REFILL (OUTPATIENT)
Dept: FAMILY MEDICINE | Facility: CLINIC | Age: 68
End: 2023-10-24
Payer: COMMERCIAL

## 2023-10-24 DIAGNOSIS — J45.20 MILD INTERMITTENT ASTHMA WITHOUT COMPLICATION: ICD-10-CM

## 2023-10-24 RX ORDER — ALBUTEROL SULFATE 90 UG/1
2 AEROSOL, METERED RESPIRATORY (INHALATION) EVERY 6 HOURS PRN
Qty: 8 G | Refills: 0 | Status: SHIPPED | OUTPATIENT
Start: 2023-10-24

## 2023-11-06 ENCOUNTER — PATIENT OUTREACH (OUTPATIENT)
Dept: CARE COORDINATION | Facility: CLINIC | Age: 68
End: 2023-11-06
Payer: COMMERCIAL

## 2023-12-04 ENCOUNTER — PATIENT OUTREACH (OUTPATIENT)
Dept: CARE COORDINATION | Facility: CLINIC | Age: 68
End: 2023-12-04
Payer: COMMERCIAL

## 2023-12-06 ENCOUNTER — ANCILLARY PROCEDURE (OUTPATIENT)
Dept: MAMMOGRAPHY | Facility: CLINIC | Age: 68
End: 2023-12-06
Attending: FAMILY MEDICINE
Payer: COMMERCIAL

## 2023-12-06 DIAGNOSIS — Z12.31 VISIT FOR SCREENING MAMMOGRAM: ICD-10-CM

## 2023-12-06 PROCEDURE — 77067 SCR MAMMO BI INCL CAD: CPT | Mod: 26 | Performed by: RADIOLOGY

## 2023-12-06 PROCEDURE — 77067 SCR MAMMO BI INCL CAD: CPT

## 2023-12-06 PROCEDURE — 77063 BREAST TOMOSYNTHESIS BI: CPT | Mod: 26 | Performed by: RADIOLOGY

## 2023-12-07 ENCOUNTER — MYC REFILL (OUTPATIENT)
Dept: FAMILY MEDICINE | Facility: CLINIC | Age: 68
End: 2023-12-07
Payer: COMMERCIAL

## 2023-12-07 DIAGNOSIS — J45.20 MILD INTERMITTENT ASTHMA WITHOUT COMPLICATION: ICD-10-CM

## 2023-12-07 RX ORDER — BUDESONIDE AND FORMOTEROL FUMARATE DIHYDRATE 80; 4.5 UG/1; UG/1
AEROSOL RESPIRATORY (INHALATION)
Qty: 20.4 G | Refills: 1 | Status: SHIPPED | OUTPATIENT
Start: 2023-12-07 | End: 2024-06-20

## 2023-12-12 ENCOUNTER — ANCILLARY PROCEDURE (OUTPATIENT)
Dept: MAMMOGRAPHY | Facility: CLINIC | Age: 68
End: 2023-12-12
Attending: FAMILY MEDICINE
Payer: COMMERCIAL

## 2023-12-12 DIAGNOSIS — R92.8 ABNORMAL MAMMOGRAM OF LEFT BREAST: ICD-10-CM

## 2023-12-12 PROCEDURE — 77065 DX MAMMO INCL CAD UNI: CPT | Mod: LT | Performed by: RADIOLOGY

## 2023-12-12 PROCEDURE — 76642 ULTRASOUND BREAST LIMITED: CPT | Mod: LT | Performed by: RADIOLOGY

## 2023-12-12 PROCEDURE — G0279 TOMOSYNTHESIS, MAMMO: HCPCS | Performed by: RADIOLOGY

## 2024-01-12 ENCOUNTER — NURSE TRIAGE (OUTPATIENT)
Dept: NURSING | Facility: CLINIC | Age: 69
End: 2024-01-12
Payer: COMMERCIAL

## 2024-01-12 NOTE — TELEPHONE ENCOUNTER
Pt reports she is in [place] and tested positive for Covid today. Pt reports her symptoms started yesterday.    Writer advised pt due to her being out of state [facility] not able to assist her. Writer advised pt to contact local healthcare agency or pharmacy for more assistance.     Pt verbalizes understanding and agrees to plan.     Reason for Disposition   Health Information question, no triage required and triager able to answer question    Protocols used: Information Only Call - No Triage-A-

## 2024-02-08 ENCOUNTER — MYC REFILL (OUTPATIENT)
Dept: ORTHOPEDICS | Facility: CLINIC | Age: 69
End: 2024-02-08
Payer: COMMERCIAL

## 2024-02-08 DIAGNOSIS — S76.312S STRAIN OF LEFT HAMSTRING MUSCLE, SEQUELA: ICD-10-CM

## 2024-02-08 NOTE — PROGRESS NOTES
DISCHARGE  Reason for Discharge: Patient has failed to schedule further appointments.    Equipment Issued: none    Discharge Plan: Patient to continue home program.    Referring Provider:  Alex Byrne

## 2024-02-16 NOTE — PATIENT INSTRUCTIONS
New labs today (obtain at any [facility])    For RIGHT EYE:   - Continue Bromday 1 time daily  - START Durezol, 2 times daily  - STOP prednisolone    Return in 3 weeks   
Yes

## 2024-04-22 NOTE — TELEPHONE ENCOUNTER
DIAGNOSIS: Limited range of motion left arm, getting worse.    APPOINTMENT DATE: 4/26/2024    Records Requested     April 22, 2024 3:05 PM  [facility]   [facility]ility  [facility]    Outcome  I called [facility] [phone]  #4 (Med Recs) - unavailable. I called again #9 - unsuccessful. I called again, remained on the line and then selected #0- unavailable.     4/25/2024 2:40pm KEB   I called [facility] [phone]  #4 (Med Recs) -their phone went to . They take 3-5 business days to process requests. They may also charge extra for mailing out scans.     I called pt Valorie - unavailable.     I called [facility] again - the medical records dept fax #: [phone]- unavailable. I faxed over a request form for the xray shoulder scan and included a shipping label.     [facility]   Ph:[phone] #4  Fax: [phone]  Mailing Address:   [address] [place] [id number]     [employer] Tracking Number:   [id number]    4/26/2024 8:44AM KEB   The image disc is in transit- see [employer] tracking # for more details.       Action April 26, 2024 1:13 PM MT   Action Taken Image disk received and sent to [facility] for upload. Clinical team notified.      NOTES STATUS DETAILS   OFFICE NOTE from referring provider     OFFICE NOTE from other specialist Care Everywhere- [facility]  Ortho OV notes are in [facility]- 3/13/2024, 2/29/2024    MEDICATION LIST Internal    XRAYS (IMAGES & REPORTS) Care Everywhere- Requested-  Xray Shoulder 3/13/2024- [facility]

## 2024-04-24 NOTE — PROGRESS NOTES
[facility]  Sports Medicine Clinic  Clinics and Surgery Center           SUBJECTIVE       Valorie Hahn is a 68 year old female presenting to clinic today with left shoulder pain.    Background:   Occupation: Retired   Hand Dominance (If pertinent): Right handed     Injury (Y/N): No   Work Comp (Y/N): No   Mechanism of Injury: No LUCAS     Duration of symptoms: 6 months    Intensity (1-10): 3/10    Aggravating factors: Pain with reaching behind   Relieving Factors: Rest   Other: limited range of motion   Cortisone injection in early march; was not help. Made her shoulder even more stiff   Prior Evaluation: Yes in [place]    Previous Surgery on the area (Y/N): No    Physical Therapy (Previous/Current/None): No      Physical Activity/Exercise (What, How Often): 3-4 times a week; paletes and strength class       PMH, Medications and Allergies were reviewed and updated as needed.    ROS:  As noted above otherwise negative.    Patient Active Problem List   Diagnosis    Herpes simplex vulvovaginitis    Mild intermittent asthma without complication    Family history of thyroid disease    Advanced directives, counseling/discussion    SI (sacroiliac) joint dysfunction    Osteitis, condensans    Sacroiliac joint disease    SI (sacroiliac) pain    Colon polyps    Dyspnea    Posterior vitreous detachment    CME (cystoid macular edema), right    Cystoid macular edema, left eye    Finger mass, right    Intermediate uveitis of both eyes    Pseudophakia of both eyes    History of 2019 novel coronavirus disease (COVID-19)    Osteopenia of multiple sites    Coronary artery disease due to calcified coronary lesion    Pulmonary nodules    Esophageal thickening    Left hamstring muscle strain       Current Outpatient Medications   Medication Sig Dispense Refill    acyclovir (ZOVIRAX) 400 MG tablet Take 1 tablet (400 mg) by mouth 2 times daily For 3 days with each outbreak. 30 tablet 1    albuterol (PROAIR  HFA/PROVENTIL HFA/VENTOLIN HFA) 108 (90 Base) MCG/ACT inhaler Inhale 2 puffs into the lungs every 6 hours as needed for shortness of breath or wheezing 8 g 0    budesonide-formoterol (SYMBICORT) 80-4.5 MCG/ACT Inhaler Inhale 1-2 puffs as needed. May use up to 12 puffs per day. 20.4 g 1    diclofenac (VOLTAREN) 1 % topical gel Apply 4 g topically 4 times daily as needed for moderate pain 350 g 0    MELOXICAM PO       pantoprazole (PROTONIX) 40 MG EC tablet Take 1 tablet (40 mg) by mouth daily 30 tablet 3            OBJECTIVE:       Vitals: There were no vitals filed for this visit.  BMI: There is no height or weight on file to calculate BMI.    Gen:  Well nourished and in no acute distress  HEENT: Extraocular movement intact  Neck: Supple  Pulm:  Breathing Comfortably. No increased respiratory effort.  Psych: Euthymic. Appropriately answers questions    MSK: Left shoulder without evidence of erythema or ecchymosis.  Tenderness to palpation of the bicipital groove, proximal shoulder.  No tenderness at the anterior posterior joint line.  No AC tenderness.  Range of motion limited in flexion, abduction, and external rotation to 90 degrees, 90 degrees, and roughly 30 degrees respectively.  Internal rotation also limited to the axillary line.  Rotator cuff strength, biceps and tricep strength, as well as deltoid strength is intact at 5/5.  No sulcus sign.  Negative apprehension.  Mildly positive speeds and Yergason's of the proximal shoulder, anterior bicipital groove.  Negative empty can, reverse press off, belly press off, cross body, impingement with Neer and Moreno, and Spurling.  Negative Ord.              ASSESSMENT and PLAN:     Valorie was seen today for pain.    Diagnoses and all orders for this visit:    Adhesive capsulitis of left shoulder  -     MR Shoulder Left w/o Contrast; Future    Tendinosis of left rotator cuff  -     MR Shoulder Left w/o Contrast; Future    Biceps tendinosis of left shoulder  -      MR Shoulder Left w/o Contrast; Future      68-year-old female, with a chronic history of left shoulder concerns, presenting to clinic today with left shoulder decreased range of motion.  No significant pain.  Patient was seen in a different place, did receive a joint injection (landmark guided), that did worsen her symptoms per the patient.  At this time her presentation is concerning for a likely rotator cuff tendinopathy that has now led to a stage 2 adhesive capsulitis.  The patient also has a presentation of a proximal biceps tendinosis, that does not seem to be the front concern for the patient's presentation.  At this point, I do think she benefit from physical therapy and she is agreeable.  Given the chronicity of this, and the worsening after the landmark guided injection, I have recommended an MRI for further evaluation and confirmation of these rotator cuff pathologies as well as the adhesive capsulitis and the constellation of symptoms.  We will have the patient return to clinic after the MRI has resulted to proceed with an ultrasound-guided injection to the most problematic area.  I am okay with her starting physical therapy now.  All questions have been answered.  Return precautions discussed.    It was a pleasure seeing Valorie today.    Options for treatment and/or follow-up care were reviewed with the patient was actively involved in the decision making process. Patient verbalized understanding and was in agreement with the plan.    Alex Byrne DO  , Sports Medicine  Department of Family Medicine and [facility]

## 2024-04-26 ENCOUNTER — OFFICE VISIT (OUTPATIENT)
Dept: ORTHOPEDICS | Facility: CLINIC | Age: 69
End: 2024-04-26
Payer: COMMERCIAL

## 2024-04-26 ENCOUNTER — PRE VISIT (OUTPATIENT)
Dept: ORTHOPEDICS | Facility: CLINIC | Age: 69
End: 2024-04-26

## 2024-04-26 DIAGNOSIS — M67.814 TENDINOSIS OF LEFT ROTATOR CUFF: ICD-10-CM

## 2024-04-26 DIAGNOSIS — M75.02 ADHESIVE CAPSULITIS OF LEFT SHOULDER: Primary | ICD-10-CM

## 2024-04-26 DIAGNOSIS — M67.814 BICEPS TENDINOSIS OF LEFT SHOULDER: ICD-10-CM

## 2024-04-26 PROCEDURE — 99214 OFFICE O/P EST MOD 30 MIN: CPT | Performed by: STUDENT IN AN ORGANIZED HEALTH CARE EDUCATION/TRAINING PROGRAM

## 2024-04-26 NOTE — LETTER
4/26/2024      RE: Valorie Hahn  [address]  [facility] [address]     Dear Colleague,    Thank you for referring your patient, Valorie Hahn, to the [facility] SPORTS MEDICINE [facility]IC [place]. Please see a copy of my visit note below.    [facility]  Sports Medicine Clinic  Clinics and Surgery Center           SUBJECTIVE       Valorie Hahn is a 68 year old female presenting to clinic today with left shoulder pain.    Background:   Occupation: Retired   Hand Dominance (If pertinent): Right handed     Injury (Y/N): No   Work Comp (Y/N): No   Mechanism of Injury: No LUCAS     Duration of symptoms: 6 months    Intensity (1-10): 3/10    Aggravating factors: Pain with reaching behind   Relieving Factors: Rest   Other: limited range of motion   Cortisone injection in early march; was not help. Made her shoulder even more stiff   Prior Evaluation: Yes in [place]    Previous Surgery on the area (Y/N): No    Physical Therapy (Previous/Current/None): No      Physical Activity/Exercise (What, How Often): 3-4 times a week; paletes and strength class       PMH, Medications and Allergies were reviewed and updated as needed.    ROS:  As noted above otherwise negative.    Patient Active Problem List   Diagnosis     Herpes simplex vulvovaginitis     Mild intermittent asthma without complication     Family history of thyroid disease     Advanced directives, counseling/discussion     SI (sacroiliac) joint dysfunction     Osteitis, condensans     Sacroiliac joint disease     SI (sacroiliac) pain     Colon polyps     Dyspnea     Posterior vitreous detachment     CME (cystoid macular edema), right     Cystoid macular edema, left eye     Finger mass, right     Intermediate uveitis of both eyes     Pseudophakia of both eyes     History of 2019 novel coronavirus disease (COVID-19)     Osteopenia of multiple sites     Coronary artery disease due to calcified coronary lesion      Pulmonary nodules     Esophageal thickening     Left hamstring muscle strain       Current Outpatient Medications   Medication Sig Dispense Refill     acyclovir (ZOVIRAX) 400 MG tablet Take 1 tablet (400 mg) by mouth 2 times daily For 3 days with each outbreak. 30 tablet 1     albuterol (PROAIR HFA/PROVENTIL HFA/VENTOLIN HFA) 108 (90 Base) MCG/ACT inhaler Inhale 2 puffs into the lungs every 6 hours as needed for shortness of breath or wheezing 8 g 0     budesonide-formoterol (SYMBICORT) 80-4.5 MCG/ACT Inhaler Inhale 1-2 puffs as needed. May use up to 12 puffs per day. 20.4 g 1     diclofenac (VOLTAREN) 1 % topical gel Apply 4 g topically 4 times daily as needed for moderate pain 350 g 0     MELOXICAM PO        pantoprazole (PROTONIX) 40 MG EC tablet Take 1 tablet (40 mg) by mouth daily 30 tablet 3            OBJECTIVE:       Vitals: There were no vitals filed for this visit.  BMI: There is no height or weight on file to calculate BMI.    Gen:  Well nourished and in no acute distress  HEENT: Extraocular movement intact  Neck: Supple  Pulm:  Breathing Comfortably. No increased respiratory effort.  Psych: Euthymic. Appropriately answers questions    MSK: Left shoulder without evidence of erythema or ecchymosis.  Tenderness to palpation of the bicipital groove, proximal shoulder.  No tenderness at the anterior posterior joint line.  No AC tenderness.  Range of motion limited in flexion, abduction, and external rotation to 90 degrees, 90 degrees, and roughly 30 degrees respectively.  Internal rotation also limited to the axillary line.  Rotator cuff strength, biceps and tricep strength, as well as deltoid strength is intact at 5/5.  No sulcus sign.  Negative apprehension.  Mildly positive speeds and Yergason's of the proximal shoulder, anterior bicipital groove.  Negative empty can, reverse press off, belly press off, cross body, impingement with Neer and Moreno, and Spurling.  Negative [place].              ASSESSMENT  and PLAN:     Valorie was seen today for pain.    Diagnoses and all orders for this visit:    Adhesive capsulitis of left shoulder  -     MR Shoulder Left w/o Contrast; Future    Tendinosis of left rotator cuff  -     MR Shoulder Left w/o Contrast; Future    Biceps tendinosis of left shoulder  -     MR Shoulder Left w/o Contrast; Future      68-year-old female, with a chronic history of left shoulder concerns, presenting to clinic today with left shoulder decreased range of motion.  No significant pain.  Patient was seen in a different place, did receive a joint injection (landmark guided), that did worsen her symptoms per the patient.  At this time her presentation is concerning for a likely rotator cuff tendinopathy that has now led to a stage 2 adhesive capsulitis.  The patient also has a presentation of a proximal biceps tendinosis, that does not seem to be the front concern for the patient's presentation.  At this point, I do think she benefit from physical therapy and she is agreeable.  Given the chronicity of this, and the worsening after the landmark guided injection, I have recommended an MRI for further evaluation and confirmation of these rotator cuff pathologies as well as the adhesive capsulitis and the constellation of symptoms.  We will have the patient return to clinic after the MRI has resulted to proceed with an ultrasound-guided injection to the most problematic area.  I am okay with her starting physical therapy now.  All questions have been answered.  Return precautions discussed.    It was a pleasure seeing Valorie today.    Options for treatment and/or follow-up care were reviewed with the patient was actively involved in the decision making process. Patient verbalized understanding and was in agreement with the plan.    Alex Byrne DO  , Sports Medicine  Department of Family Medicine and [facility]      Again, thank you for allowing  me to participate in the care of your patient.      Sincerely,    Alex Byrne, DO

## 2024-04-26 NOTE — PATIENT INSTRUCTIONS
[facility] Outpatient Physical Therapy Locations    To schedule an appointment please call our scheduling department at [phone]    To fax a referral to be scheduled fax to [phone]    [place]: [address], [facility]: [address] [facility]: [address], [facility]: [address], [facility]: [address], [facility]: [address] [facility]: [address] [place], [place]  [place]: [address] [facility] , [address], [facility]: [address] [facility] , [address], [facility]: [address] [facility]: [address], [facility]: [address], [facility]: [address], [facility]: [address], [facility] [place] [facility]: [address] [facility]: [address], [facility]: [facility]: [address] [facility], [address], [facility]: [address], [facility]: [address], [place], [place]  [place]: [address] [facility]: [address] [place] [address]: [address], [facility]: [address] [facility] [address], [place]  [place]: [address], [facility]: [address], [facility]: [address] [facility] [place], [place]  [facility]: [address] [place], [place]  [facility]: [address] [place] [address]  [place]: [address] [facility], [address], [facility] [address] [facility],  [facility]: [address] [facility] [place], [place]

## 2024-05-08 NOTE — PROGRESS NOTES
PROCEDURE ENCOUNTER    [facility]  Alex Byrne, DO  May 8, 2024     Name: Valorie Hahn  MRN: [id number]  Age: 68 year old  : 1955    Referring provider:   Diagnosis: L Shoulder Adhesie Capsulitis    Procedure: Glenohumeral Injection - Ultrasound Guided  The patient was informed of the risks and the benefits of the procedure and a written consent was signed.  The patient s left shoulder was prepped with chlorhexidine in sterile fashion.   40 mg of triamcinolone suspension was drawn up into a 5 mL syringe with 20 mL of 1% lidocaine w/o Epi, and 10 cc of sterile saline.  Injection was performed using sterile technique.  Under ultrasound guidance a 3.5-inch 22-gauge needle was used to enter the left glenohumeral joint.  Posterior approach was used with the patient in lateral recumbent position, arm in neutral position at the side.  Needle placement was visualized and documented with ultrasound.  Ultrasound visualization was necessary due to the small joint space entered.  Injection performed long axis to the probe.  Injection solution visualized within the joint space.  Images were permanently stored for the patient's record.  There were no complications. The patient tolerated the procedure well. There was negligible bleeding.   Therapy scheduled to follow for mobilization.  The patient was instructed to call or go to the emergency room with any unusual pain, swelling, redness, or if otherwise concerned.  Alex Byrne D.O., [facility]  , Sports Medicine  Department of Family Medicine and [facility]

## 2024-05-09 ENCOUNTER — HOSPITAL ENCOUNTER (OUTPATIENT)
Dept: MRI IMAGING | Facility: CLINIC | Age: 69
Discharge: HOME | End: 2024-05-09
Attending: STUDENT IN AN ORGANIZED HEALTH CARE EDUCATION/TRAINING PROGRAM | Admitting: STUDENT IN AN ORGANIZED HEALTH CARE EDUCATION/TRAINING PROGRAM
Payer: COMMERCIAL

## 2024-05-09 DIAGNOSIS — M75.02 ADHESIVE CAPSULITIS OF LEFT SHOULDER: ICD-10-CM

## 2024-05-09 DIAGNOSIS — M67.814 BICEPS TENDINOSIS OF LEFT SHOULDER: ICD-10-CM

## 2024-05-09 DIAGNOSIS — M67.814 TENDINOSIS OF LEFT ROTATOR CUFF: ICD-10-CM

## 2024-05-09 PROCEDURE — 73221 MRI JOINT UPR EXTREM W/O DYE: CPT | Mod: 26 | Performed by: RADIOLOGY

## 2024-05-09 PROCEDURE — 73221 MRI JOINT UPR EXTREM W/O DYE: CPT | Mod: LT

## 2024-05-13 ENCOUNTER — TELEPHONE (OUTPATIENT)
Dept: FAMILY MEDICINE | Facility: CLINIC | Age: 69
End: 2024-05-13

## 2024-05-13 ENCOUNTER — OFFICE VISIT (OUTPATIENT)
Dept: ORTHOPEDICS | Facility: CLINIC | Age: 69
End: 2024-05-13
Payer: COMMERCIAL

## 2024-05-13 DIAGNOSIS — M75.02 ADHESIVE CAPSULITIS OF LEFT SHOULDER: Primary | ICD-10-CM

## 2024-05-13 PROCEDURE — 20611 DRAIN/INJ JOINT/BURSA W/US: CPT | Mod: LT | Performed by: STUDENT IN AN ORGANIZED HEALTH CARE EDUCATION/TRAINING PROGRAM

## 2024-05-13 RX ORDER — TRIAMCINOLONE ACETONIDE 40 MG/ML
40 INJECTION, SUSPENSION INTRA-ARTICULAR; INTRAMUSCULAR
Status: SHIPPED | OUTPATIENT
Start: 2024-05-13

## 2024-05-13 RX ORDER — LIDOCAINE HYDROCHLORIDE 10 MG/ML
4 INJECTION, SOLUTION EPIDURAL; INFILTRATION; INTRACAUDAL; PERINEURAL
Status: SHIPPED | OUTPATIENT
Start: 2024-05-13

## 2024-05-13 RX ADMIN — TRIAMCINOLONE ACETONIDE 40 MG: 40 INJECTION, SUSPENSION INTRA-ARTICULAR; INTRAMUSCULAR at 10:27

## 2024-05-13 RX ADMIN — LIDOCAINE HYDROCHLORIDE 4 ML: 10 INJECTION, SOLUTION EPIDURAL; INFILTRATION; INTRACAUDAL; PERINEURAL at 10:27

## 2024-05-13 NOTE — LETTER
2024      RE: Valorie Hahn  [address]  [facility] [address]     Dear Colleague,    Thank you for referring your patient, Valorie Hahn, to the [facility] [place]. Please see a copy of my visit note below.    PROCEDURE ENCOUNTER    [facility]  Alex AMOSTamy Chavez, DO  May 8, 2024     Name: Valorie Hahn  MRN: [id number]  Age: 68 year old  : 1955    Referring provider:   Diagnosis: L Shoulder Adhesie Capsulitis    Procedure: Glenohumeral Injection - Ultrasound Guided  The patient was informed of the risks and the benefits of the procedure and a written consent was signed.  The patient s left shoulder was prepped with chlorhexidine in sterile fashion.   40 mg of triamcinolone suspension was drawn up into a 5 mL syringe with 20 mL of 1% lidocaine w/o Epi, and 10 cc of sterile saline.  Injection was performed using sterile technique.  Under ultrasound guidance a 3.5-inch 22-gauge needle was used to enter the left glenohumeral joint.  Posterior approach was used with the patient in lateral recumbent position, arm in neutral position at the side.  Needle placement was visualized and documented with ultrasound.  Ultrasound visualization was necessary due to the small joint space entered.  Injection performed long axis to the probe.  Injection solution visualized within the joint space.  Images were permanently stored for the patient's record.  There were no complications. The patient tolerated the procedure well. There was negligible bleeding.   Therapy scheduled to follow for mobilization.  The patient was instructed to call or go to the emergency room with any unusual pain, swelling, redness, or if otherwise concerned.  Alex Byrne D.O., CA[facility]  , Sports Medicine  Department of [facility]      Large Joint Injection: L glenohumeral joint    Date/Time: 2024  10:27 AM    Performed by: Alex Byrne DO  Authorized by: Alex Byrne DO    Indications:  Pain  Needle Size:  22 G  Guidance: ultrasound    Approach:  Posterolateral  Location:  Shoulder      Site:  L glenohumeral joint  Medications:  40 mg triamcinolone 40 MG/ML; 4 mL lidocaine (PF) 1 %  Outcome:  Tolerated well, no immediate complications  Procedure discussed: discussed risks, benefits, and alternatives    Consent Given by:  Patient  Timeout: timeout called immediately prior to procedure    Prep: patient was prepped and draped in usual sterile fashion       Drew Carmona M.A., LAT, ATC  Certified Athletic Trainer            Again, thank you for allowing me to participate in the care of your patient.      Sincerely,    Alex Byrne DO

## 2024-05-13 NOTE — NURSING NOTE
[address] [address]  Dept: [phone]  ______________________________________________________________________________    Patient: Valorie Hahn   : 1955   MRN: [id number]   May 13, 2024    INVASIVE PROCEDURE SAFETY CHECKLIST    Date: 24   Procedure: Left GH USG CSI  Patient Name: Valorie Hahn  MRN: [id number]  YOB: 1955    Action: Complete sections as appropriate. Any discrepancy results in a HARD COPY until resolved.     PRE PROCEDURE:  Patient ID verified with 2 identifiers (name and  or MRN): Yes  Procedure and site verified with patient/designee (when able): Yes  Accurate consent documentation in medical record: Yes  H&P (or appropriate assessment) documented in medical record: Yes  H&P must be up to 20 days prior to procedure and updates within 24 hours of procedure as applicable: NA  Relevant diagnostic and radiology test results appropriately labeled and displayed as applicable: Yes  Procedure site(s) marked with provider initials: NA    TIMEOUT:  Time-Out performed immediately prior to starting procedure, including verbal and active participation of all team members addressing the following:Yes  * Correct patient identify  * Confirmed that the correct side and site are marked  * An accurate procedure consent form  * Agreement on the procedure to be done  * Correct patient position  * Relevant images and results are properly labeled and appropriately displayed  * The need to administer antibiotics or fluids for irrigation purposes during the procedure as applicable   * Safety precautions based on patient history or medication use    DURING PROCEDURE: Verification of correct person, site, and procedures any time the responsibility for care of the patient is transferred to another member of the care team.       Prior to injection, verified patient identity using patient's name and date of  birth.  Due to injection administration, patient instructed to remain in clinic for 15 minutes  afterwards, and to report any adverse reaction to me immediately.    Joint injection was performed.      Lidocaine Amount Wasted:  Yes: 1 mg/ml   Vial/Syringe: Multi dose vial  Expiration Date:  5/1/27      Drew Carmona, [facility]  May 13, 2024

## 2024-05-13 NOTE — TELEPHONE ENCOUNTER
Order/Referral Request    Who is requesting: Joan radiology scheduling     Orders being requested: Ct Chest w/out     When are orders needed by: 05/23/24, order expires soon and needs to be extended or have a new order placed.      Does patient have a preference on a Group/Provider/Facility? [facility]     Does patient have an appointment scheduled?: Yes: 05/23/24 with Radiology     Where to send orders: Place orders within Epic

## 2024-05-13 NOTE — PROGRESS NOTES
Large Joint Injection: L glenohumeral joint    Date/Time: 5/13/2024 10:27 AM    Performed by: Alex Byrne DO  Authorized by: Alex Byrne DO    Indications:  Pain  Needle Size:  22 G  Guidance: ultrasound    Approach:  Posterolateral  Location:  Shoulder      Site:  L glenohumeral joint  Medications:  40 mg triamcinolone 40 MG/ML; 4 mL lidocaine (PF) 1 %  Outcome:  Tolerated well, no immediate complications  Procedure discussed: discussed risks, benefits, and alternatives    Consent Given by:  Patient  Timeout: timeout called immediately prior to procedure    Prep: patient was prepped and draped in usual sterile fashion       Drew Carmona M.A., LAT, ATC  Certified Athletic Trainer

## 2024-05-14 NOTE — TELEPHONE ENCOUNTER
Order extended through end of year.  BITA Martinez, FIORDALIZAN, RN (she/her)  [facility] RN

## 2024-05-22 ENCOUNTER — THERAPY VISIT (OUTPATIENT)
Dept: PHYSICAL THERAPY | Facility: CLINIC | Age: 69
End: 2024-05-22
Attending: STUDENT IN AN ORGANIZED HEALTH CARE EDUCATION/TRAINING PROGRAM
Payer: COMMERCIAL

## 2024-05-22 DIAGNOSIS — M75.00 ADHESIVE CAPSULITIS OF SHOULDER, UNSPECIFIED LATERALITY: Primary | ICD-10-CM

## 2024-05-22 PROCEDURE — 97110 THERAPEUTIC EXERCISES: CPT | Mod: GP | Performed by: PHYSICAL THERAPIST

## 2024-05-22 PROCEDURE — 97140 MANUAL THERAPY 1/> REGIONS: CPT | Mod: GP | Performed by: PHYSICAL THERAPIST

## 2024-05-22 PROCEDURE — 97161 PT EVAL LOW COMPLEX 20 MIN: CPT | Mod: GP | Performed by: PHYSICAL THERAPIST

## 2024-05-22 ASSESSMENT — ACTIVITIES OF DAILY LIVING (ADL)
REACHING_FOR_SOMETHING_ON_A_HIGH_SHELF: 4
TOUCHING_THE_BACK_OF_YOUR_NECK: 3
WASHING_YOUR_HAIR?: 0
PLACING_AN_OBJECT_ON_A_HIGH_SHELF: 3
WASHING_YOUR_BACK: 5
PUTTING_ON_AN_UNDERSHIRT_OR_A_PULLOVER_SWEATER: 0
AT_ITS_WORST?: 7
WHEN_LYING_ON_THE_INVOLVED_SIDE: 3
PLEASE_INDICATE_YOR_PRIMARY_REASON_FOR_REFERRAL_TO_THERAPY:: SHOULDER
CARRYING_A_HEAVY_OBJECT_OF_10_POUNDS: 0
PUTTING_ON_YOUR_PANTS: 0
PUTTING_ON_A_SHIRT_THAT_BUTTONS_DOWN_THE_FRONT: 0
PUSHING_WITH_THE_INVOLVED_ARM: 4
REMOVING_SOMETHING_FROM_YOUR_BACK_POCKET: 5

## 2024-05-22 NOTE — PROGRESS NOTES
PHYSICAL THERAPY EVALUATION  Type of Visit: Evaluation    See electronic medical record for Abuse and Falls Screening details.    Subjective       Presenting condition or subjective complaint: limited range of motion left shoulder since 12/2023  Date of onset: 04/26/24 (MD visit)    Relevant medical history: Asthma   Dates & types of surgery: appendix at age 13    Prior diagnostic imaging/testing results: MRI; X-ray     Prior therapy history for the same diagnosis, illness or injury: No        Living Environment  Social support: With a significant other or spouse   Type of home: House   Stairs to enter the home:         Ramp: No   Stairs inside the home: Yes 12 Is there a railing: Yes   Help at home: Home and Yard maintenance tasks  Equipment owned:       Employment: No    Hobbies/Interests: biking gardening    Patient goals for therapy: exercise and garden      Objective   SHOULDER EVALUATION  PAIN: Pain Level at Rest: 0/10  Pain Level with Use: 7/10  Pain Location: Anterior L shoulder  Pain Quality: Sharp  Pain Frequency: intermittent  Pain is Worst: activity dependent  Pain is Exacerbated By: reaching overhead, reaching behind back, lifting weights  Pain is Relieved By: rest and pain meds  Pain Progression: Unchanged  INTEGUMENTARY (edema, incisions): WNL    WEIGHTBEARING ALIGNMENT: Shoulder/UE WB Alignment:Rounded shoulders, Forward head, Scapular winging L  ROM:   (Degrees) Left AROM Left PROM Right AROM  Right PROM   Shoulder Flexion 132 155     Shoulder Extension 52      Shoulder Abduction 100 135     Shoulder Adduction       Shoulder Internal Rotation       Shoulder External Rotation 56 65     Shoulder Horizontal Abduction       Shoulder Horizontal Adduction       Shoulder Flexion ER       Shoulder Flexion IR Central L-S junction      Elbow Extension WNL      Elbow Flexion WNL      Pain:   End feel:     STRENGTH:  L shoulder strength grossly 5/5 without focal weakness, reduced scapular stability strength  grossly 4-/5(middle/lower trap, rhomboids)  FLEXIBILITY: Decreased upper trap L, Decreased levator L, Decreased pectoralis major L, Decreased pectoralis minor L, Decreased latissimus L  SPECIAL TESTS:    Left Right   Impingement     Neer's     Hawkin's-Jaswant     Coracoid Impingement     Internal impingement     Labral     Anterior Slide     Kiowa's     Crank     Instability     Apprehension (anterior)     Relocation (anterior)     Anterior Load & Shift     Posterior Load & Shift     Posterior instability (with 90 degrees flex)     Multi-Directional Instability      Sulcus     Biceps      Speed's     Rotator Cuff Tear     Drop Arm Negative     Belly Press Negative     Lift off  Negative       PALPATION:   + Tenderness At Location Left Right   Clavicle     Sternoclavicular     Acromioclavicular     Biceps     Triceps     Supraspinatus +    Infraspinatus +    Teres minor     Subscapularis +    Deltoid     Levator     Rhomboids     Upper trap +    Incisional     Bicipital groove +      JOINT MOBILITY:    Left Right   Glenohumeral anterior WNL    Glenohumeral posterior Hypomobile    Glenohumeral inferior Hypomobile    Acromioclavicular     Scapulothoracic Hypomobile    Sternoclavicular     Scapulohumeral rhythm           Assessment & Plan   CLINICAL IMPRESSIONS  Medical Diagnosis: L shoulder adhesive capsulitis    Treatment Diagnosis: L shoulder adhesive capsulitis   Impression/Assessment: Patient is a 68 year old female with L shoulder complaints.  The following significant findings have been identified: Pain, Decreased ROM/flexibility, Decreased joint mobility, Decreased strength, and Decreased activity tolerance. These impairments interfere with their ability to perform self care tasks, household chores, and driving  as compared to previous level of function.     Clinical Decision Making (Complexity):  Clinical Presentation: Stable/Uncomplicated  Clinical Presentation Rationale: based on medical and personal  factors listed in PT evaluation  Clinical Decision Making (Complexity): Low complexity    PLAN OF CARE  Treatment Interventions:  Interventions: Manual Therapy, Neuromuscular Re-education, Therapeutic Activity, Therapeutic Exercise, Self-Care/Home Management    Long Term Goals     PT Goal 1  Goal Identifier: Reaching  Goal Description: Patient will be able to reach behind back to T11-12 with PL 0-2/10  Rationale: to maximize safety and independence with performance of ADLs and functional tasks;to maximize safety and independence within the home;to maximize safety and independence with self cares  Goal Progress: Pateint reaches behind back to central L-S with PL 7/10  Target Date: 08/14/24      Frequency of Treatment: 1x/week  Duration of Treatment: 12 weeks    Recommended Referrals to Other Professionals: Physical Therapy  Education Assessment:   Learner/Method: Patient;Listening;Reading;Demonstration;Pictures/Video;No Barriers to Learning    Risks and benefits of evaluation/treatment have been explained.   Patient/Family/caregiver agrees with Plan of Care.     Evaluation Time:     PT Eval, Low Complexity Minutes (01245): 15       Signing Clinician: Maame Ortez, MICHELE      [facility]                                                                                   OUTPATIENT PHYSICAL THERAPY      PLAN OF TREATMENT FOR OUTPATIENT REHABILITATION   Patient's Last Name, First Name, Valorie Hazel YOB: 1955   Provider's Name   [facility]   Medical Record No.  [id number]     Onset Date: 04/26/24 (MD visit)  Start of Care Date: 05/22/24     Medical Diagnosis:  L shoulder adhesive capsulitis      PT Treatment Diagnosis:  L shoulder adhesive capsulitis Plan of Treatment  Frequency/Duration: 1x/week/ 12 weeks    Certification date from 05/22/24 to 08/14/24         See note for plan of treatment details and functional goals      Maame Ortez, PT                         I CERTIFY THE NEED FOR THESE SERVICES FURNISHED UNDER        THIS PLAN OF TREATMENT AND WHILE UNDER MY CARE     (Physician attestation of this document indicates review and certification of the therapy plan).              Referring Provider:  Alex Byrne    Initial Assessment  See Epic Evaluation- Start of Care Date: 05/22/24

## 2024-05-28 ENCOUNTER — ANCILLARY PROCEDURE (OUTPATIENT)
Dept: CT IMAGING | Facility: CLINIC | Age: 69
End: 2024-05-28
Attending: FAMILY MEDICINE
Payer: COMMERCIAL

## 2024-05-28 DIAGNOSIS — R91.8 PULMONARY NODULES: ICD-10-CM

## 2024-05-28 PROCEDURE — 71250 CT THORAX DX C-: CPT | Performed by: RADIOLOGY

## 2024-05-29 ENCOUNTER — THERAPY VISIT (OUTPATIENT)
Dept: PHYSICAL THERAPY | Facility: CLINIC | Age: 69
End: 2024-05-29
Attending: STUDENT IN AN ORGANIZED HEALTH CARE EDUCATION/TRAINING PROGRAM
Payer: COMMERCIAL

## 2024-05-29 DIAGNOSIS — M75.00 ADHESIVE CAPSULITIS OF SHOULDER, UNSPECIFIED LATERALITY: Primary | ICD-10-CM

## 2024-05-29 PROCEDURE — 97110 THERAPEUTIC EXERCISES: CPT | Mod: GP | Performed by: PHYSICAL THERAPIST

## 2024-05-29 PROCEDURE — 97140 MANUAL THERAPY 1/> REGIONS: CPT | Mod: GP | Performed by: PHYSICAL THERAPIST

## 2024-06-05 ENCOUNTER — THERAPY VISIT (OUTPATIENT)
Dept: PHYSICAL THERAPY | Facility: CLINIC | Age: 69
End: 2024-06-05
Attending: STUDENT IN AN ORGANIZED HEALTH CARE EDUCATION/TRAINING PROGRAM
Payer: COMMERCIAL

## 2024-06-05 DIAGNOSIS — M75.00 ADHESIVE CAPSULITIS OF SHOULDER, UNSPECIFIED LATERALITY: Primary | ICD-10-CM

## 2024-06-05 PROCEDURE — 97140 MANUAL THERAPY 1/> REGIONS: CPT | Mod: GP | Performed by: PHYSICAL THERAPIST

## 2024-06-05 PROCEDURE — 97110 THERAPEUTIC EXERCISES: CPT | Mod: GP | Performed by: PHYSICAL THERAPIST

## 2024-06-20 ENCOUNTER — OFFICE VISIT (OUTPATIENT)
Dept: FAMILY MEDICINE | Facility: CLINIC | Age: 69
End: 2024-06-20
Attending: FAMILY MEDICINE
Payer: COMMERCIAL

## 2024-06-20 ENCOUNTER — MYC MEDICAL ADVICE (OUTPATIENT)
Dept: FAMILY MEDICINE | Facility: CLINIC | Age: 69
End: 2024-06-20

## 2024-06-20 VITALS
SYSTOLIC BLOOD PRESSURE: 130 MMHG | HEIGHT: 63 IN | TEMPERATURE: 97.2 F | RESPIRATION RATE: 20 BRPM | HEART RATE: 64 BPM | WEIGHT: 177 LBS | OXYGEN SATURATION: 98 % | BODY MASS INDEX: 31.36 KG/M2 | DIASTOLIC BLOOD PRESSURE: 81 MMHG

## 2024-06-20 DIAGNOSIS — Z23 NEED FOR SHINGLES VACCINE: ICD-10-CM

## 2024-06-20 DIAGNOSIS — R91.8 PULMONARY NODULES: ICD-10-CM

## 2024-06-20 DIAGNOSIS — R73.9 ELEVATED BLOOD SUGAR: ICD-10-CM

## 2024-06-20 DIAGNOSIS — Z83.49 FAMILY HISTORY OF THYROID DISEASE: ICD-10-CM

## 2024-06-20 DIAGNOSIS — I25.84 CORONARY ARTERY DISEASE DUE TO CALCIFIED CORONARY LESION: ICD-10-CM

## 2024-06-20 DIAGNOSIS — M85.89 OSTEOPENIA OF MULTIPLE SITES: ICD-10-CM

## 2024-06-20 DIAGNOSIS — R06.09 DOE (DYSPNEA ON EXERTION): ICD-10-CM

## 2024-06-20 DIAGNOSIS — Z00.00 ENCOUNTER FOR MEDICARE ANNUAL WELLNESS EXAM: Primary | ICD-10-CM

## 2024-06-20 DIAGNOSIS — Z23 NEED FOR TDAP VACCINATION: ICD-10-CM

## 2024-06-20 DIAGNOSIS — H04.123 CHRONIC DRYNESS OF BOTH EYES: ICD-10-CM

## 2024-06-20 DIAGNOSIS — I25.10 CORONARY ARTERY DISEASE DUE TO CALCIFIED CORONARY LESION: ICD-10-CM

## 2024-06-20 DIAGNOSIS — J45.20 MILD INTERMITTENT ASTHMA WITHOUT COMPLICATION: ICD-10-CM

## 2024-06-20 PROBLEM — Z86.16 HISTORY OF 2019 NOVEL CORONAVIRUS DISEASE (COVID-19): Status: RESOLVED | Noted: 2020-05-04 | Resolved: 2024-06-20

## 2024-06-20 PROBLEM — H30.23 INTERMEDIATE UVEITIS OF BOTH EYES: Status: RESOLVED | Noted: 2019-12-22 | Resolved: 2024-06-20

## 2024-06-20 PROBLEM — S76.312A LEFT HAMSTRING MUSCLE STRAIN: Status: ACTIVE | Noted: 2023-09-13

## 2024-06-20 PROBLEM — K22.89 ESOPHAGEAL THICKENING: Status: RESOLVED | Noted: 2023-05-23 | Resolved: 2024-06-20

## 2024-06-20 PROBLEM — H35.351 CME (CYSTOID MACULAR EDEMA), RIGHT: Status: RESOLVED | Noted: 2019-11-11 | Resolved: 2024-06-20

## 2024-06-20 PROBLEM — R22.31 FINGER MASS, RIGHT: Status: RESOLVED | Noted: 2019-12-03 | Resolved: 2024-06-20

## 2024-06-20 PROBLEM — Z96.1 PSEUDOPHAKIA OF BOTH EYES: Status: RESOLVED | Noted: 2020-01-10 | Resolved: 2024-06-20

## 2024-06-20 PROBLEM — H35.352 CYSTOID MACULAR EDEMA, LEFT EYE: Status: RESOLVED | Noted: 2019-11-12 | Resolved: 2024-06-20

## 2024-06-20 LAB
CHOLEST SERPL-MCNC: 213 MG/DL
FASTING STATUS PATIENT QL REPORTED: ABNORMAL
HDLC SERPL-MCNC: 81 MG/DL
LDLC SERPL CALC-MCNC: 113 MG/DL
NONHDLC SERPL-MCNC: 132 MG/DL
TRIGL SERPL-MCNC: 95 MG/DL
TSH SERPL DL<=0.005 MIU/L-ACNC: 2.33 UIU/ML (ref 0.3–4.2)

## 2024-06-20 PROCEDURE — 99214 OFFICE O/P EST MOD 30 MIN: CPT | Mod: 25 | Performed by: FAMILY MEDICINE

## 2024-06-20 PROCEDURE — G0439 PPPS, SUBSEQ VISIT: HCPCS | Performed by: FAMILY MEDICINE

## 2024-06-20 PROCEDURE — 36415 COLL VENOUS BLD VENIPUNCTURE: CPT | Performed by: FAMILY MEDICINE

## 2024-06-20 PROCEDURE — 84443 ASSAY THYROID STIM HORMONE: CPT | Mod: GZ | Performed by: FAMILY MEDICINE

## 2024-06-20 PROCEDURE — 80061 LIPID PANEL: CPT | Performed by: FAMILY MEDICINE

## 2024-06-20 RX ORDER — ASPIRIN 81 MG/1
81 TABLET, CHEWABLE ORAL DAILY
COMMUNITY
Start: 2024-06-20

## 2024-06-20 RX ORDER — BUDESONIDE AND FORMOTEROL FUMARATE DIHYDRATE 80; 4.5 UG/1; UG/1
AEROSOL RESPIRATORY (INHALATION)
Qty: 20.4 G | Refills: 1 | Status: SHIPPED | OUTPATIENT
Start: 2024-06-20

## 2024-06-20 RX ORDER — RESPIRATORY SYNCYTIAL VIRUS VACCINE 120MCG/0.5
0.5 KIT INTRAMUSCULAR ONCE
Qty: 1 EACH | Refills: 0 | Status: CANCELLED | OUTPATIENT
Start: 2024-06-20 | End: 2024-06-20

## 2024-06-20 RX ORDER — ROSUVASTATIN CALCIUM 5 MG/1
5 TABLET, COATED ORAL DAILY
Qty: 90 TABLET | Refills: 4 | Status: SHIPPED | OUTPATIENT
Start: 2024-06-20

## 2024-06-20 RX ORDER — MONTELUKAST SODIUM 10 MG/1
10 TABLET ORAL AT BEDTIME
Qty: 90 TABLET | Refills: 1 | Status: SHIPPED | OUTPATIENT
Start: 2024-06-20

## 2024-06-20 SDOH — HEALTH STABILITY: PHYSICAL HEALTH: ON AVERAGE, HOW MANY DAYS PER WEEK DO YOU ENGAGE IN MODERATE TO STRENUOUS EXERCISE (LIKE A BRISK WALK)?: 2 DAYS

## 2024-06-20 ASSESSMENT — ASTHMA QUESTIONNAIRES
QUESTION_1 LAST FOUR WEEKS HOW MUCH OF THE TIME DID YOUR ASTHMA KEEP YOU FROM GETTING AS MUCH DONE AT WORK, SCHOOL OR AT HOME: NONE OF THE TIME
QUESTION_3 LAST FOUR WEEKS HOW OFTEN DID YOUR ASTHMA SYMPTOMS (WHEEZING, COUGHING, SHORTNESS OF BREATH, CHEST TIGHTNESS OR PAIN) WAKE YOU UP AT NIGHT OR EARLIER THAN USUAL IN THE MORNING: NOT AT ALL
ACT_TOTALSCORE: 22
ACT_TOTALSCORE: 22
QUESTION_5 LAST FOUR WEEKS HOW WOULD YOU RATE YOUR ASTHMA CONTROL: WELL CONTROLLED
QUESTION_4 LAST FOUR WEEKS HOW OFTEN HAVE YOU USED YOUR RESCUE INHALER OR NEBULIZER MEDICATION (SUCH AS ALBUTEROL): ONCE A WEEK OR LESS
QUESTION_2 LAST FOUR WEEKS HOW OFTEN HAVE YOU HAD SHORTNESS OF BREATH: ONCE OR TWICE A WEEK

## 2024-06-20 ASSESSMENT — SOCIAL DETERMINANTS OF HEALTH (SDOH): HOW OFTEN DO YOU GET TOGETHER WITH FRIENDS OR RELATIVES?: TWICE A WEEK

## 2024-06-20 NOTE — PATIENT INSTRUCTIONS
"Patient Education   Preventive Care Advice   This is general advice we often give to help people stay healthy. Your care team may have specific advice just for you. Please talk to your care team about your own preventive care needs.  Lifestyle  Exercise at least 150 minutes each week (30 minutes a day, 5 days a week).  Do muscle strengthening activities 2 days a week. These help control your weight and prevent disease.  No smoking.  Wear sunscreen to prevent skin cancer.  Have your home tested for radon every 2 to 5 years. Radon is a colorless, odorless gas that can harm your lungs. To learn more, go to www.health.[facility].mn.us and search for \"Radon in Homes.\"  Keep guns unloaded and locked up in a safe place like a safe or gun vault, or, use a gun lock and hide the keys. Always lock away bullets separately. To learn more, visit [employer].gov and search for \"safe gun storage.\"  Nutrition  Eat 5 or more servings of fruits and vegetables each day.  Try wheat bread, brown rice and whole grain pasta (instead of white bread, rice, and pasta).  Get enough calcium and vitamin D. Check the label on foods and aim for 100% of the RDA (recommended daily allowance).  Regular exams  Have a dental exam and cleaning every 6 months.  See your health care team every year to talk about:  Any changes in your health.  Any medicines your care team has prescribed.  Preventive care, family planning, and ways to prevent chronic diseases.  Shots (vaccines)   HPV shots (up to age 26), if you've never had them before.  Hepatitis B shots (up to age 59), if you've never had them before.  COVID-19 shot: Get this shot when it's due.  Flu shot: Get a flu shot every year.  Tetanus shot: Get a tetanus shot every 10 years.  Pneumococcal, hepatitis A, and RSV shots: Ask your care team if you need these based on your risk.  Shingles shot (for age 50 and up).  General health tests  Diabetes screening:  Starting at age 35, Get screened for diabetes at least " every 3 years.  If you are younger than age 35, ask your care team if you should be screened for diabetes.  Cholesterol test: At age 39, start having a cholesterol test every 5 years, or more often if advised.  Bone density scan (DEXA): At age 50, ask your care team if you should have this scan for osteoporosis (brittle bones).  Hepatitis C: Get tested at least once in your life.  Abdominal aortic aneurysm screening: Talk to your doctor about having this screening if you:  Have ever smoked; and  Are biologically male; and  Are between the ages of 65 and 75.  STIs (sexually transmitted infections)  Before age 24: Ask your care team if you should be screened for STIs.  After age 24: Get screened for STIs if you're at risk. You are at risk for STIs (including HIV) if:  You are sexually active with more than one person.  You don't use condoms every time.  You or a partner was diagnosed with a sexually transmitted infection.  If you are at risk for HIV, ask about PrEP medicine to prevent HIV.  Get tested for HIV at least once in your life, whether you are at risk for HIV or not.  Cancer screening tests  Cervical cancer screening: If you have a cervix, begin getting regular cervical cancer screening tests at age 21. Most people who have regular screenings with normal results can stop after age 65. Talk about this with your provider.  Breast cancer scan (mammogram): If you've ever had breasts, begin having regular mammograms starting at age 40. This is a scan to check for breast cancer.  Colon cancer screening: It is important to start screening for colon cancer at age 45.  Have a colonoscopy test every 10 years (or more often if you're at risk) Or, ask your provider about stool tests like a FIT test every year or Cologuard test every 3 years.  To learn more about your testing options, visit: www.[employer]/720676.pdf.  For help making a decision, visit: trace/gy86312.  Prostate cancer screening test: If you have a  prostate and are age 55 to 69, ask your provider if you would benefit from a yearly prostate cancer screening test.  Lung cancer screening: If you are a current or former smoker age 50 to 80, ask your care team if ongoing lung cancer screenings are right for you.  For informational purposes only. Not to replace the advice of your health care provider. Copyright   2023 [facility]. All rights reserved. Clinically reviewed by the [facility] Transitions Program. [place] [id number] - REV 04/24.

## 2024-06-20 NOTE — PROGRESS NOTES
Preventive Care Visit  [facility]  Anjali Kapadia MD, Family Medicine  Jun 20, 2024      Assessment & Plan     Problem List as of 6/20/2024 Reviewed: 6/20/2024 10:13 AM by Anjali Kapadia MD            Noted       High    1. Encounter for Medicare annual wellness exam - Primary 6/20/2024     Overview Addendum 6/20/2024  9:45 AM by Anjali Kapadia MD      Mammo: done 12/2023 - next due 12/2024   Pap: no further indicated based on guidelines due to age and history of adequate screening.  Colon: up to date, next due 2029  Dexa: Osteopenia noted 2022, due again 2025. Wt bearing exercise, calcium, D recommended.  Immunizations: Due for shingles, TDAP.  Do in pharmacy.  COVID vaccine - Discussed and will wait until new one in fall as just had COVID in Jan  Labs: check lipids and TSH today  Discussed healthy lifestyle and healthy aging recommendations including the importance of:  regular exercise & weight lifting  adequate and regular sleep, at least 7 hrs nightly   5+ fruits and veggies daily, whole grains, limit processed food          Relevant Medications     zoster vaccine recombinant adjuvanted (SHINGRIX) injection     Other Relevant Orders     REVIEW OF HEALTH MAINTENANCE PROTOCOL ORDERS (Completed)       Medium    2. Chronic dryness of both eyes 6/20/2024     Overview Signed 6/20/2024  9:56 AM by Anjali Kapadia MD      06/20/2024 A/P:  Follows with [facility] doctors  Tryva nasal medicine daily.  Works well           3. Coronary artery disease due to calcified coronary lesion 5/23/2023     Overview Addendum 6/20/2024 10:14 AM by Anjali Kapadia MD      06/20/2024 A/P:  Discussed today; she hasn't started statin yet but willing to now.  Also discussed taking baby ASA daily    2023: noted on CT Calcium test.  Start moderate dose rosuvastatin.          Relevant Medications     rosuvastatin (CRESTOR) 5 MG tablet     aspirin (ASA) 81 MG chewable tablet     Other  Relevant Orders     Lipid panel reflex to direct LDL Fasting     REVIEW OF HEALTH MAINTENANCE PROTOCOL ORDERS (Completed)    4. NIX (dyspnea on exertion) 6/20/2024     Overview Signed 6/20/2024 10:15 AM by Anjali Kapadia MD      06/20/2024 A/P:  Noted in last year, with going up stairs  Plan: start taking Symbicort daily  If no improvement in 1 month, get further cardiac workup (refer cards)            Relevant Medications     aspirin (ASA) 81 MG chewable tablet     montelukast (SINGULAIR) 10 MG tablet     budesonide-formoterol (SYMBICORT) 80-4.5 MCG/ACT Inhaler     Other Relevant Orders     TSH WITH FREE T4 REFLEX    5. Family history of thyroid disease - mom, sister had Cushing's, and other sister may have thyroid as well 7/7/2010     Relevant Orders     TSH WITH FREE T4 REFLEX    6. Mild intermittent asthma without complication - worsened with allergies 5/25/2004     Overview Addendum 6/20/2024 10:00 AM by Anjali Kapadia MD      06/20/2024 A/P:  On Symbicort prn  Worse in [place] (goes there in the winter)  Add montelukast (Singulair) next winter when goes - sent today.            Relevant Medications     aspirin (ASA) 81 MG chewable tablet     montelukast (SINGULAIR) 10 MG tablet     budesonide-formoterol (SYMBICORT) 80-4.5 MCG/ACT Inhaler     Other Relevant Orders     REVIEW OF HEALTH MAINTENANCE PROTOCOL ORDERS (Completed)    7. Osteopenia of multiple sites 6/3/2022     Overview Addendum 6/20/2024  9:58 AM by Anjali Kapadia MD      06/20/2024 A/P:  Noted on dexa 2022, will repeat in 2025.  Does weight training class at [employer] twice a week.  Does jumping in class as well.  Gets calcium in diet.  Take vit D 800 IU.    Dexa 2022          Relevant Medications     aspirin (ASA) 81 MG chewable tablet     Other Relevant Orders     TSH WITH FREE T4 REFLEX     REVIEW OF HEALTH MAINTENANCE PROTOCOL ORDERS (Completed)                 BMI  Estimated body mass index is 31.4 kg/m  as calculated from the  "following:    Height as of this encounter: 1.599 m (5' 2.95\").    Weight as of this encounter: 80.3 kg (177 lb).       Counseling  Appropriate preventive services were discussed with this patient, including applicable screening as appropriate for fall prevention, nutrition, physical activity, Tobacco-use cessation, weight loss and cognition.  Checklist reviewing preventive services available has been given to the patient.  Reviewed patient's diet, addressing concerns and/or questions.   She is at risk for lack of exercise and has been provided with information to increase physical activity for the benefit of her well-being.           Emily Eugene is a 68 year old, presenting for the following:  Physical        6/20/2024     9:30 AM   Additional Questions   Roomed by sona   Accompanied by self         Health Care Directive  Patient does not have a Health Care Directive or Living Will: Patient states has Advance Directive and will bring in a copy to clinic.    HPI          6/20/2024   General Health   How would you rate your overall physical health? Good   Feel stress (tense, anxious, or unable to sleep) Not at all            6/20/2024   Nutrition   Diet: Regular (no restrictions)            6/20/2024   Exercise   Days per week of moderate/strenous exercise 2 days      (!) EXERCISE CONCERN      6/20/2024   Social Factors   Frequency of gathering with friends or relatives Twice a week   Worry food won't last until get money to buy more No   Food not last or not have enough money for food? No   Do you have housing? (Housing is defined as stable permanent housing and does not include staying ouside in a car, in a tent, in an abandoned building, in an overnight shelter, or couch-surfing.) Yes   Are you worried about losing your housing? No   Lack of transportation? No   Unable to get utilities (heat,electricity)? No            6/20/2024   Fall Risk   Fallen 2 or more times in the past year? No    No   Trouble " with walking or balance? No    No       Multiple values from one day are sorted in reverse-chronological order          2024   Activities of Daily Living- Home Safety   Needs help with the following daily activites None of the above   Safety concerns in the home None of the above            2024   Dental   Dentist two times every year? Yes            2024   Hearing Screening   Hearing concerns? None of the above            2024   Driving Risk Screening   Patient/family members have concerns about driving No            2024   General Alertness/Fatigue Screening   Have you been more tired than usual lately? No            2024   Urinary Incontinence Screening   Bothered by leaking urine in past 6 months No               Today's PHQ-2 Score:       2024     9:21 AM   PHQ-2 (  [employer])   Q1: Little interest or pleasure in doing things 0   Q2: Feeling down, depressed or hopeless 0   PHQ-2 Score 0   Q1: Little interest or pleasure in doing things Not at all   Q2: Feeling down, depressed or hopeless Not at all   PHQ-2 Score 0           2024   Substance Use   Alcohol more than 3/day or more than 7/wk No   Do you have a current opioid prescription? No   How severe/bad is pain from 1 to 10? 0/10 (No Pain)   Do you use any other substances recreationally? (!) ALCOHOL        Social History     Tobacco Use    Smoking status: Former     Current packs/day: 0.00     Average packs/day: 0.5 packs/day for 10.4 years (5.2 ttl pk-yrs)     Types: Cigarettes     Start date: 1990     Quit date: 2000     Years since quittin.0    Smokeless tobacco: Never   Vaping Use    Vaping status: Never Used   Substance Use Topics    Alcohol use: Yes     Comment: 1-2 glasses of wine per night    Drug use: No           2023   LAST FHS-7 RESULTS   1st degree relative breast or ovarian cancer No   Any relative bilateral breast cancer No   Any male have breast cancer No   Any ONE woman have BOTH  breast AND ovarian cancer No   Any woman with breast cancer before 50yrs No   2 or more relatives with breast AND/OR ovarian cancer No   2 or more relatives with breast AND/OR bowel cancer No                 History of abnormal Pap smear:         Latest Ref Rng & Units 8/10/2016    11:19 AM 8/10/2016    12:00 AM 5/31/2011    11:11 AM   PAP / HPV   PAP (Historical)   OTHER-NIL, See Result  NIL    HPV 16 DNA NEG Negative      HPV 18 DNA NEG Negative      Other HR HPV NEG Negative        ASCVD Risk   The 10-year ASCVD risk score (Karol DAMICO, et al., 2019) is: 6.4%    Values used to calculate the score:      Age: 68 years      Sex: Female      Is Non- : No      Diabetic: No      Tobacco smoker: No      Systolic Blood Pressure: 130 mmHg      Is BP treated: No      HDL Cholesterol: 70 mg/dL      Total Cholesterol: 192 mg/dL            Reviewed and updated as needed this visit by Provider   Tobacco  Allergies  Meds  Problems  Med Hx  Surg Hx  Fam Hx              Current providers sharing in care for this patient include:  Patient Care Team:  Anjali Kapadia MD as PCP - General (Family Medicine)  Saul Lu MD as MD (Family Medicine - Sports Medicine)  Joan Sher MD as Referring Physician (Family Practice)  Sanjay Plummer MD as MD (Cardiology)  Amy Winter MD as MD (Cardiology)  [facility]  Belkis Ku OD as Physician (Optometry)  Danna Garcia MD as MD (Ophthalmology)  Yi Obrien MD as Referring Physician (Pulmonary Disease)  Sheron Gunter MD as Referring Physician (Internal Medicine)  Aaron Hamlin MD as MD (Otolaryngology)  Anjali Kapadia MD as MD (Family Medicine)  Alex Byrne DO as Assigned Musculoskeletal Provider  Anjali Kapadia MD as Assigned PCP    The following health maintenance items are reviewed in Epic and correct as of today:  Health Maintenance   Topic Date  "Due    RSV VACCINE (Pregnancy & 60+) (1 - 1-dose 60+ series) Never done    ZOSTER IMMUNIZATION (2 of 2) 03/26/2021    DTAP/TDAP/TD IMMUNIZATION (2 - Td or Tdap) 04/27/2022    COVID-19 Vaccine (6 - 2023-24 season) 09/01/2023    LIPID  05/11/2024    TSH W/FREE T4 REFLEX  05/11/2024    ASTHMA ACTION PLAN  05/01/2031 (Originally 8/2/2018)    INFLUENZA VACCINE (Season Ended) 09/01/2024    MAMMO SCREENING  12/12/2024    ASTHMA CONTROL TEST  12/20/2024    GLUCOSE  05/11/2025    DEXA  05/31/2025    MEDICARE ANNUAL WELLNESS VISIT  06/20/2025    ANNUAL REVIEW OF HM ORDERS  06/20/2025    FALL RISK ASSESSMENT  06/20/2025    COLORECTAL CANCER SCREENING  01/03/2029    ADVANCE CARE PLANNING  06/20/2029    HEPATITIS C SCREENING  Completed    PHQ-2 (once per calendar year)  Completed    Pneumococcal Vaccine: 65+ Years  Completed    IPV IMMUNIZATION  Aged Out    HPV IMMUNIZATION  Aged Out    MENINGITIS IMMUNIZATION  Aged Out    RSV MONOCLONAL ANTIBODY  Aged Out    PAP  Discontinued            Objective    Exam  /81 (BP Location: Right arm, Patient Position: Sitting, Cuff Size: Adult Regular)   Pulse 64   Temp 97.2  F (36.2  C) (Temporal)   Resp 20   Ht 1.599 m (5' 2.95\")   Wt 80.3 kg (177 lb)   LMP 02/14/2010   SpO2 98%   BMI 31.40 kg/m     Estimated body mass index is 31.4 kg/m  as calculated from the following:    Height as of this encounter: 1.599 m (5' 2.95\").    Weight as of this encounter: 80.3 kg (177 lb).    Physical Exam  GENERAL: alert and no distress  EYES: Eyes grossly normal to inspection, PERRL and conjunctivae and sclerae normal  HENT: ear canals and TM's normal, nose and mouth without ulcers or lesions  NECK: no adenopathy, no asymmetry, masses, or scars  RESP: lungs clear to auscultation - no rales, rhonchi or wheezes  CV: regular rate and rhythm, normal S1 S2, no S3 or S4, no murmur, click or rub, no peripheral edema  ABDOMEN: soft, nontender, no hepatosplenomegaly, no masses and bowel sounds " normal  MS: no gross musculoskeletal defects noted, no edema  SKIN: no suspicious lesions or rashes  NEURO: Normal strength and tone, mentation intact and speech normal  PSYCH: mentation appears normal, affect normal/bright        6/20/2024   Mini Cog   Clock Draw Score 2 Normal   3 Item Recall 2 objects recalled   Mini Cog Total Score 4                 Signed Electronically by: Anjali Kapadia MD

## 2024-07-09 ENCOUNTER — TRANSFERRED RECORDS (OUTPATIENT)
Dept: HEALTH INFORMATION MANAGEMENT | Facility: CLINIC | Age: 69
End: 2024-07-09
Payer: COMMERCIAL

## 2024-11-12 ENCOUNTER — PATIENT OUTREACH (OUTPATIENT)
Dept: CARE COORDINATION | Facility: CLINIC | Age: 69
End: 2024-11-12
Payer: COMMERCIAL

## 2024-11-26 DIAGNOSIS — A60.04 HERPES SIMPLEX VULVOVAGINITIS: ICD-10-CM

## 2024-11-26 DIAGNOSIS — S76.312S STRAIN OF LEFT HAMSTRING MUSCLE, SEQUELA: ICD-10-CM

## 2024-11-26 RX ORDER — ACYCLOVIR 400 MG/1
400 TABLET ORAL 2 TIMES DAILY
Qty: 30 TABLET | Refills: 1 | Status: SHIPPED | OUTPATIENT
Start: 2024-11-26

## 2024-12-10 ENCOUNTER — PATIENT OUTREACH (OUTPATIENT)
Dept: CARE COORDINATION | Facility: CLINIC | Age: 69
End: 2024-12-10
Payer: COMMERCIAL

## 2024-12-12 ENCOUNTER — ANCILLARY PROCEDURE (OUTPATIENT)
Dept: MAMMOGRAPHY | Facility: CLINIC | Age: 69
End: 2024-12-12
Attending: FAMILY MEDICINE
Payer: COMMERCIAL

## 2024-12-12 DIAGNOSIS — Z12.31 VISIT FOR SCREENING MAMMOGRAM: ICD-10-CM

## 2024-12-12 PROCEDURE — 77067 SCR MAMMO BI INCL CAD: CPT

## 2024-12-12 PROCEDURE — 77063 BREAST TOMOSYNTHESIS BI: CPT

## 2024-12-12 PROCEDURE — 77063 BREAST TOMOSYNTHESIS BI: CPT | Mod: 26 | Performed by: STUDENT IN AN ORGANIZED HEALTH CARE EDUCATION/TRAINING PROGRAM

## 2024-12-12 PROCEDURE — 77067 SCR MAMMO BI INCL CAD: CPT | Mod: 26 | Performed by: STUDENT IN AN ORGANIZED HEALTH CARE EDUCATION/TRAINING PROGRAM

## 2024-12-17 DIAGNOSIS — A60.04 HERPES SIMPLEX VULVOVAGINITIS: ICD-10-CM

## 2024-12-17 RX ORDER — ACYCLOVIR 400 MG/1
400 TABLET ORAL 2 TIMES DAILY
Qty: 30 TABLET | Refills: 1 | OUTPATIENT
Start: 2024-12-17

## 2025-02-12 PROBLEM — M75.00 ADHESIVE CAPSULITIS OF SHOULDER, UNSPECIFIED LATERALITY: Status: RESOLVED | Noted: 2024-05-22 | Resolved: 2025-02-12

## 2025-04-03 ENCOUNTER — OFFICE VISIT (OUTPATIENT)
Dept: INTERNAL MEDICINE | Facility: CLINIC | Age: 70
End: 2025-04-03
Payer: COMMERCIAL

## 2025-04-03 VITALS
DIASTOLIC BLOOD PRESSURE: 77 MMHG | OXYGEN SATURATION: 99 % | WEIGHT: 176.6 LBS | RESPIRATION RATE: 16 BRPM | TEMPERATURE: 97.6 F | BODY MASS INDEX: 31.29 KG/M2 | SYSTOLIC BLOOD PRESSURE: 121 MMHG | HEIGHT: 63 IN | HEART RATE: 66 BPM

## 2025-04-03 DIAGNOSIS — H69.91 DYSFUNCTION OF RIGHT EUSTACHIAN TUBE: Primary | ICD-10-CM

## 2025-04-03 RX ORDER — FLUTICASONE PROPIONATE 50 MCG
1 SPRAY, SUSPENSION (ML) NASAL DAILY
Qty: 16 G | Refills: 3 | Status: SHIPPED | OUTPATIENT
Start: 2025-04-03

## 2025-04-03 ASSESSMENT — ASTHMA QUESTIONNAIRES
QUESTION_2 LAST FOUR WEEKS HOW OFTEN HAVE YOU HAD SHORTNESS OF BREATH: NOT AT ALL
QUESTION_1 LAST FOUR WEEKS HOW MUCH OF THE TIME DID YOUR ASTHMA KEEP YOU FROM GETTING AS MUCH DONE AT WORK, SCHOOL OR AT HOME: NONE OF THE TIME
QUESTION_5 LAST FOUR WEEKS HOW WOULD YOU RATE YOUR ASTHMA CONTROL: COMPLETELY CONTROLLED
QUESTION_4 LAST FOUR WEEKS HOW OFTEN HAVE YOU USED YOUR RESCUE INHALER OR NEBULIZER MEDICATION (SUCH AS ALBUTEROL): NOT AT ALL
QUESTION_3 LAST FOUR WEEKS HOW OFTEN DID YOUR ASTHMA SYMPTOMS (WHEEZING, COUGHING, SHORTNESS OF BREATH, CHEST TIGHTNESS OR PAIN) WAKE YOU UP AT NIGHT OR EARLIER THAN USUAL IN THE MORNING: NOT AT ALL
ACT_TOTALSCORE: 25

## 2025-04-03 NOTE — PROGRESS NOTES
"  {PROVIDER CHARTING PREFERENCE:179110}    Emily Eugene is a 69 year old, presenting for the following health issues:  Ear Problem  Pt states they are hearing a drumming noise in ear on and off. Pt states it is only in right ear. Pt states this started in December       This all started after having ears plugged on a flight. Plugging improved after 2 days but had onset of thumping noise in the right ear. This has now happened several times now in the past several months. Happens at night.  History of Present Illness       Reason for visit:  Ear  Symptom onset:  More than a month  Symptoms include:  Drumming sound  intermittent  Symptom intensity:  Moderate  Symptom progression:  Staying the same  Had these symptoms before:  No  What makes it worse:  No  What makes it better:  No   She is taking medications regularly.        {MA/LPN/RN Pre-Provider Visit Orders- hCG/UA/Strep (Optional):263713}  {SUPERLIST (Optional):832020}  {additonal problems for provider to add (Optional):832709}    {ROS Picklists (Optional):594354}      Objective    /77   Pulse 66   Temp 97.6  F (36.4  C) (Temporal)   Resp 16   Ht 1.598 m (5' 2.9\")   Wt 80.1 kg (176 lb 9.6 oz)   LMP 02/14/2010   SpO2 99%   BMI 31.38 kg/m    Body mass index is 31.38 kg/m .  Physical Exam   {Exam List (Optional):374248}    {Diagnostic Test Results (Optional):860446}        Signed Electronically by: Sondra Arora MD  {Email feedback regarding this note to [email or web address]   :[id number]}  "

## 2025-04-17 DIAGNOSIS — H69.91 DYSFUNCTION OF RIGHT EUSTACHIAN TUBE: ICD-10-CM

## 2025-06-24 ENCOUNTER — MYC REFILL (OUTPATIENT)
Dept: FAMILY MEDICINE | Facility: CLINIC | Age: 70
End: 2025-06-24
Payer: COMMERCIAL

## 2025-06-24 DIAGNOSIS — J45.20 MILD INTERMITTENT ASTHMA WITHOUT COMPLICATION: ICD-10-CM

## 2025-06-24 RX ORDER — MONTELUKAST SODIUM 10 MG/1
10 TABLET ORAL AT BEDTIME
Qty: 90 TABLET | Refills: 0 | Status: SHIPPED | OUTPATIENT
Start: 2025-06-24

## 2025-06-25 RX ORDER — MONTELUKAST SODIUM 10 MG/1
10 TABLET ORAL AT BEDTIME
Qty: 90 TABLET | Refills: 1 | OUTPATIENT
Start: 2025-06-25

## 2025-07-16 RX ORDER — FLUTICASONE PROPIONATE 50 MCG
1 SPRAY, SUSPENSION (ML) NASAL DAILY
Qty: 24 ML | Refills: 3 | OUTPATIENT
Start: 2025-07-16

## 2025-07-16 NOTE — PROGRESS NOTES
Preventive Care Visit  [facility]  Dacia Hodge MD, Family Medicine  Jul 17, 2025      Assessment & Plan     Encounter for Medicare annual wellness exam  reviewed well adults screens vaccines and labs     Family history of thyroid disease - mom, sister had Cushing's, and other sister may have thyroid as well    - TSH WITH FREE T4 REFLEX; Future  - TSH WITH FREE T4 REFLEX    Coronary artery disease due to calcified coronary lesion  Taking statin  No concerns  - Lipid panel reflex to direct LDL Fasting; Future  - Comprehensive metabolic panel (BMP + Alb, Alk Phos, ALT, AST, Total. Bili, TP); Future  - Lipid panel reflex to direct LDL Fasting  - Comprehensive metabolic panel (BMP + Alb, Alk Phos, ALT, AST, Total. Bili, TP)  - rosuvastatin (CRESTOR) 5 MG tablet; Take 1 tablet (5 mg) by mouth daily.    Osteopenia of multiple sites  Due for dexa   - DEXA HIP/PELVIS/SPINE - Future; Future    Screening for diabetes mellitus    - Comprehensive metabolic panel (BMP + Alb, Alk Phos, ALT, AST, Total. Bili, TP); Future  - Comprehensive metabolic panel (BMP + Alb, Alk Phos, ALT, AST, Total. Bili, TP)    Screening for cardiovascular condition    - Comprehensive metabolic panel (BMP + Alb, Alk Phos, ALT, AST, Total. Bili, TP); Future  - Comprehensive metabolic panel (BMP + Alb, Alk Phos, ALT, AST, Total. Bili, TP)    Need for vaccination      Chronic bronchitis, unspecified chronic bronchitis type (H)  Stable managed with meds    Mild intermittent asthma without complication    - albuterol (PROAIR HFA/PROVENTIL HFA/VENTOLIN HFA) 108 (90 Base) MCG/ACT inhaler; Inhale 2 puffs into the lungs every 6 hours as needed for shortness of breath or wheezing.  - budesonide-formoterol (SYMBICORT/BREYNA) 80-4.5 MCG/ACT inhaler; Inhale 1-2 puffs as needed. May use up to 12 puffs per day.  - montelukast (SINGULAIR) 10 MG tablet; Take 1 tablet (10 mg) by mouth at bedtime. During seasonal allergy symptoms or in  "[place]    NIX (dyspnea on exertion)    - budesonide-formoterol (SYMBICORT/BREYNA) 80-4.5 MCG/ACT inhaler; Inhale 1-2 puffs as needed. May use up to 12 puffs per day.    Strain of left hamstring muscle, sequela    - diclofenac (VOLTAREN) 1 % topical gel; Apply 2 g topically 4 times daily as needed for moderate pain.    Dysfunction of right eustachian tube    - fluticasone (FLONASE) 50 MCG/ACT nasal spray; Spray 1 spray into both nostrils daily.  Patient has been advised of split billing requirements and indicates understanding: Yes    BMI  Estimated body mass index is 30.42 kg/m  as calculated from the following:    Height as of this encounter: 1.626 m (5' 4\").    Weight as of this encounter: 80.4 kg (177 lb 3.2 oz).       Counseling  Appropriate preventive services were addressed with this patient via screening, questionnaire, or discussion as appropriate for fall prevention, nutrition, physical activity, Tobacco-use cessation, social engagement, weight loss and cognition.  Checklist reviewing preventive services available has been given to the patient.  Reviewed patient's diet, addressing concerns and/or questions.   She is at risk for lack of exercise and has been provided with information to increase physical activity for the benefit of her well-being.   The patient reports drinking more than 3 alcoholic drinks per day and/or more than 7 drhnks per week. The patient was counseled and given information about possible harmful effects of excessive alcohol intake.Reviewed preventive health counseling, as reflected in patient instructions        Emily Eugene is a 69 year old, presenting for the following:  Physical (AWV)        7/17/2025     9:52 AM   Additional Questions   Roomed by Liseth MARTINEZ          HPI    Wellness Visit Notes:     -Mammogram: Last done 12/2024 (impression: normal/benign). Due 12/2025.   -DEXA: Last done 5/2022 (impression: osteopenia). Due 5/2025. Plan: Patient agreeable.   -PAP: " Discontinued/Aged Out   -Colon Cancer Screening: Last done via colonoscopy on 1/2019. (Impression: Normal, repeat 10 years). Due 1/2029.     -Dermatology: Pt verbalized they do meet with dermatology regularly. .    -Immunizations: Patient is due for the following vaccines: RSV. Advised patient to receive at a pharmacy due to Medicare insurance coverage.       Had a uti while camping  Concerned about drug interactions and has stopped this temporarily     (Z83.49) Family history of thyroid disease - mom, sister had Cushing's, and other sister may have thyroid as well  Comment:   Plan: TSH WITH FREE T4 REFLEX            (I25.10,  I25.84) Coronary artery disease due to calcified coronary lesion  Comment: had coronary calcium CT showing some plaque - started stain and tolerating this well  Plan: Lipid panel reflex to direct LDL Fasting,         Comprehensive metabolic panel (BMP + Alb, Alk         Phos, ALT, AST, Total. Bili, TP), rosuvastatin         (CRESTOR) 5 MG tablet            (M85.89) Osteopenia of multiple sites  Comment: due for dexa reveiwed calcium d nd weight bearing  Plan: DEXA HIP/PELVIS/SPINE - Future            (Z13.1) Screening for diabetes mellitus  Comment:   Plan: Comprehensive metabolic panel (BMP + Alb, Alk         Phos, ALT, AST, Total. Bili, TP)            (Z13.6) Screening for cardiovascular condition  Comment:   Plan: Comprehensive metabolic panel (BMP + Alb, Alk         Phos, ALT, AST, Total. Bili, TP)            (J42) Chronic bronchitis, unspecified chronic bronchitis type (H)  Comment: stable tends to cough in the morning then this clears  Plan:     (J45.20) Mild intermittent asthma without complication  Comment: ACT 19 today.  Usually rare use albuterol.  Now since COVID-19 worsened.  Plan: albuterol (PROAIR HFA/PROVENTIL HFA/VENTOLIN         HFA) 108 (90 Base) MCG/ACT inhaler,         budesonide-formoterol (SYMBICORT/BREYNA) 80-4.5        MCG/ACT inhaler, montelukast (SINGULAIR) 10 MG          tablet            (J45.20) Mild intermittent asthma without complication  Comment:   Plan: albuterol (PROAIR HFA/PROVENTIL HFA/VENTOLIN         HFA) 108 (90 Base) MCG/ACT inhaler,         budesonide-formoterol (SYMBICORT/BREYNA) 80-4.5        MCG/ACT inhaler, montelukast (SINGULAIR) 10 MG         tablet            (R06.09) NIX (dyspnea on exertion)  Comment:   Plan: budesonide-formoterol (SYMBICORT/BREYNA) 80-4.5        MCG/ACT inhaler      Refills       (S76.312S) Strain of left hamstring muscle, sequela  Comment: refills  Plan: diclofenac (VOLTAREN) 1 % topical gel            (H69.91) Dysfunction of right eustachian tube  Comment: refills  Plan: fluticasone (FLONASE) 50 MCG/ACT nasal spray               Advance Care Planning    Patient states has Health Care Directive and will send to [facility].        7/17/2025   General Health   How would you rate your overall physical health? Good   Feel stress (tense, anxious, or unable to sleep) Only a little   (!) STRESS CONCERN      7/17/2025   Nutrition   Diet: Regular (no restrictions)         7/17/2025   Exercise   Days per week of moderate/strenous exercise 3 days         7/17/2025   Social Factors   Frequency of gathering with friends or relatives Twice a week   Worry food won't last until get money to buy more No   Food not last or not have enough money for food? No   Do you have housing? (Housing is defined as stable permanent housing and does not include staying outside in a car, in a tent, in an abandoned building, in an overnight shelter, or couch-surfing.) Yes   Are you worried about losing your housing? No   Lack of transportation? No   Unable to get utilities (heat,electricity)? No         7/17/2025   Fall Risk   Fallen 2 or more times in the past year? No   Trouble with walking or balance? No          7/17/2025   Activities of Daily Living- Home Safety   Needs help with the following daily activites None of the above   Safety concerns in the home  None of the above         2025   Dental   Dentist two times every year? Yes         2025   Hearing Screening   Hearing concerns? None of the above         2025   Driving Risk Screening   Patient/family members have concerns about driving No         2025   General Alertness/Fatigue Screening   Have you been more tired than usual lately? No         2025   Urinary Incontinence Screening   Bothered by leaking urine in past 6 months No               2025   Substance Use   Alcohol more than 3/day or more than 7/wk Yes   How often do you have a drink containing alcohol 4 or more times a week   How many alcohol drinks on typical day 1 or 2   How often do you have 5+ drinks at one occasion Never   Audit 2/3 Score 0   How often not able to stop drinking once started Never   How often failed to do what normally expected Never   How often needed first drink in am after a heavy drinking session Never   How often feeling of guilt or remorse after drinking Never   How often unable to remember what happened the night before Never   Have you or someone else been injured because of your drinking No   Has anyone been concerned or suggested you cut down on drinking No   TOTAL SCORE - AUDIT 4   Do you have a current opioid prescription? No   How severe/bad is pain from 1 to 10? 0/10 (No Pain)   Do you use any other substances recreationally? No     Social History     Tobacco Use    Smoking status: Former     Current packs/day: 0.00     Average packs/day: 0.5 packs/day for 10.4 years (5.2 ttl pk-yrs)     Types: Cigarettes     Start date: 1990     Quit date: 2000     Years since quittin.1    Smokeless tobacco: Never   Vaping Use    Vaping status: Never Used   Substance Use Topics    Alcohol use: Yes     Comment: 1-2 glasses of wine per night    Drug use: No           2024   LAST FHS-7 RESULTS   1st degree relative breast or ovarian cancer No   Any relative bilateral breast cancer No   Any  male have breast cancer No   Any ONE woman have BOTH breast AND ovarian cancer No   Any woman with breast cancer before 50yrs No   2 or more relatives with breast AND/OR ovarian cancer No   2 or more relatives with breast AND/OR bowel cancer No        Mammogram Screening - Mammogram every 1-2 years updated in Health Maintenance based on mutual decision making      History of abnormal Pap smear: Status post hysterectomy with removal of cervix and no history of CIN2 or greater or cervical cancer. Health Maintenance and Surgical History updated.        Latest Ref Rng & Units 8/10/2016    11:19 AM 8/10/2016    12:00 AM 5/31/2011    11:11 AM   PAP / HPV   PAP (Historical)   OTHER-NIL, See Result  NIL    HPV 16 DNA NEG Negative      HPV 18 DNA NEG Negative      Other HR HPV NEG Negative        ASCVD Risk   The 10-year ASCVD risk score (Karol DAMICO, et al., 2019) is: 7.2%    Values used to calculate the score:      Age: 69 years      Sex: Female      Is Non- : No      Diabetic: No      Tobacco smoker: No      Systolic Blood Pressure: 129 mmHg      Is BP treated: No      HDL Cholesterol: 81 mg/dL      Total Cholesterol: 213 mg/dL            Reviewed and updated as needed this visit by Provider                      Current providers sharing in care for this patient include:  Patient Care Team:  Anjali Kapadia MD as PCP - General (Family Medicine)  Saul Lu MD as MD (Family Medicine - Sports Medicine)  Joan Sher MD as Referring Physician (Family Practice)  Sanjay Plummer MD as MD (Cardiology)  Amy Winter MD as MD (Cardiology)  Belkis Ku OD as Physician (Optometry)  Danna Garcia MD as MD (Ophthalmology)  Yi Obrien MD as Referring Physician (Pulmonary Disease)  Sheron Gunter MD as Referring Physician (Internal Medicine)  Aaron Hamlin MD as MD (Otolaryngology)  Anjali Kapadia MD as MD (Family Medicine)  Chavez  "Alex AMOS DO as Assigned Musculoskeletal Provider  Anjali Kapadia MD as Assigned PCP    The following health maintenance items are reviewed in Epic and correct as of today:  Health Maintenance   Topic Date Due    RSV VACCINE (1 - Risk 60-74 years 1-dose series) Never done    DIABETES SCREENING  05/11/2025    DEXA  05/31/2025    LIPID  06/20/2025    ANNUAL REVIEW OF HM ORDERS  06/20/2025    MEDICARE ANNUAL WELLNESS VISIT  06/20/2025    TSH W/FREE T4 REFLEX  06/20/2025    ASTHMA ACTION PLAN  05/01/2031 (Originally 8/2/2018)    INFLUENZA VACCINE (1) 09/01/2025    ASTHMA CONTROL TEST  10/03/2025    MAMMO SCREENING  12/12/2025    FALL RISK ASSESSMENT  07/17/2026    COLORECTAL CANCER SCREENING  01/03/2029    ADVANCE CARE PLANNING  06/20/2029    DTAP/TDAP/TD VACCINE (3 - Td or Tdap) 06/25/2034    HEPATITIS C SCREENING  Completed    PHQ-2 (once per calendar year)  Completed    PNEUMOCOCCAL VACCINE 50+ YEARS  Completed    ZOSTER VACCINE  Completed    COVID-19 VACCINE  Completed    HPV VACCINE  Aged Out    MENINGITIS VACCINE  Aged Out    PAP  Discontinued         Review of Systems  Constitutional, HEENT, cardiovascular, pulmonary, gi and gu systems are negative, except as otherwise noted.     Objective    Exam  /86   Pulse 73   Temp 97.3  F (36.3  C) (Temporal)   Resp 18   Ht 1.626 m (5' 4\")   Wt 80.4 kg (177 lb 3.2 oz)   LMP 02/14/2010   SpO2 100%   BMI 30.42 kg/m     Estimated body mass index is 30.42 kg/m  as calculated from the following:    Height as of this encounter: 1.626 m (5' 4\").    Weight as of this encounter: 80.4 kg (177 lb 3.2 oz).    Physical Exam  GENERAL: alert and no distress  EYES: Eyes grossly normal to inspection, PERRL and conjunctivae and sclerae normal  HENT: ear canals and TM's normal, nose and mouth without ulcers or lesions  NECK: no adenopathy, no asymmetry, masses, or scars  RESP: lungs clear to auscultation - no rales, rhonchi or wheezes  BREAST: normal without masses, " tenderness or nipple discharge and no palpable axillary masses or adenopathy  CV: regular rate and rhythm, normal S1 S2, no S3 or S4, no murmur, click or rub, no peripheral edema  ABDOMEN: soft, nontender, no hepatosplenomegaly, no masses and bowel sounds normal  MS: no gross musculoskeletal defects noted, no edema  SKIN: no suspicious lesions or rashes  NEURO: Normal strength and tone, mentation intact and speech normal  PSYCH: mentation appears normal, affect normal/bright         7/17/2025   Mini Cog   Clock Draw Score 2 Normal   3 Item Recall 2 objects recalled   Mini Cog Total Score 4              Signed Electronically by: Dacia Hodge MD

## 2025-07-17 ENCOUNTER — OFFICE VISIT (OUTPATIENT)
Dept: FAMILY MEDICINE | Facility: CLINIC | Age: 70
End: 2025-07-17
Payer: COMMERCIAL

## 2025-07-17 VITALS
SYSTOLIC BLOOD PRESSURE: 129 MMHG | OXYGEN SATURATION: 100 % | HEIGHT: 64 IN | HEART RATE: 73 BPM | DIASTOLIC BLOOD PRESSURE: 86 MMHG | TEMPERATURE: 97.3 F | RESPIRATION RATE: 18 BRPM | WEIGHT: 177.2 LBS | BODY MASS INDEX: 30.25 KG/M2

## 2025-07-17 DIAGNOSIS — I25.10 CORONARY ARTERY DISEASE DUE TO CALCIFIED CORONARY LESION: ICD-10-CM

## 2025-07-17 DIAGNOSIS — Z13.6 SCREENING FOR CARDIOVASCULAR CONDITION: ICD-10-CM

## 2025-07-17 DIAGNOSIS — Z00.00 ENCOUNTER FOR MEDICARE ANNUAL WELLNESS EXAM: Primary | ICD-10-CM

## 2025-07-17 DIAGNOSIS — H69.91 DYSFUNCTION OF RIGHT EUSTACHIAN TUBE: ICD-10-CM

## 2025-07-17 DIAGNOSIS — I25.84 CORONARY ARTERY DISEASE DUE TO CALCIFIED CORONARY LESION: ICD-10-CM

## 2025-07-17 DIAGNOSIS — J45.20 MILD INTERMITTENT ASTHMA WITHOUT COMPLICATION: ICD-10-CM

## 2025-07-17 DIAGNOSIS — Z13.1 SCREENING FOR DIABETES MELLITUS: ICD-10-CM

## 2025-07-17 DIAGNOSIS — Z83.49 FAMILY HISTORY OF THYROID DISEASE: ICD-10-CM

## 2025-07-17 DIAGNOSIS — S76.312S STRAIN OF LEFT HAMSTRING MUSCLE, SEQUELA: ICD-10-CM

## 2025-07-17 DIAGNOSIS — M85.89 OSTEOPENIA OF MULTIPLE SITES: ICD-10-CM

## 2025-07-17 DIAGNOSIS — R06.09 DOE (DYSPNEA ON EXERTION): ICD-10-CM

## 2025-07-17 DIAGNOSIS — Z23 NEED FOR VACCINATION: ICD-10-CM

## 2025-07-17 DIAGNOSIS — J42 CHRONIC BRONCHITIS, UNSPECIFIED CHRONIC BRONCHITIS TYPE (H): ICD-10-CM

## 2025-07-17 LAB
ALBUMIN SERPL BCG-MCNC: 4.3 G/DL (ref 3.5–5.2)
ALP SERPL-CCNC: 73 U/L (ref 40–150)
ALT SERPL W P-5'-P-CCNC: 17 U/L (ref 0–50)
ANION GAP SERPL CALCULATED.3IONS-SCNC: 12 MMOL/L (ref 7–15)
AST SERPL W P-5'-P-CCNC: 22 U/L (ref 0–45)
BILIRUB SERPL-MCNC: 0.4 MG/DL
BUN SERPL-MCNC: 12 MG/DL (ref 8–23)
CALCIUM SERPL-MCNC: 9.6 MG/DL (ref 8.8–10.4)
CHLORIDE SERPL-SCNC: 106 MMOL/L (ref 98–107)
CHOLEST SERPL-MCNC: 171 MG/DL
CREAT SERPL-MCNC: 0.57 MG/DL (ref 0.51–0.95)
EGFRCR SERPLBLD CKD-EPI 2021: >90 ML/MIN/1.73M2
FASTING STATUS PATIENT QL REPORTED: YES
FASTING STATUS PATIENT QL REPORTED: YES
GLUCOSE SERPL-MCNC: 93 MG/DL (ref 70–99)
HCO3 SERPL-SCNC: 23 MMOL/L (ref 22–29)
HDLC SERPL-MCNC: 77 MG/DL
LDLC SERPL CALC-MCNC: 81 MG/DL
NONHDLC SERPL-MCNC: 94 MG/DL
POTASSIUM SERPL-SCNC: 4.4 MMOL/L (ref 3.4–5.3)
PROT SERPL-MCNC: 7.2 G/DL (ref 6.4–8.3)
SODIUM SERPL-SCNC: 141 MMOL/L (ref 135–145)
TRIGL SERPL-MCNC: 67 MG/DL
TSH SERPL DL<=0.005 MIU/L-ACNC: 1.82 UIU/ML (ref 0.3–4.2)

## 2025-07-17 RX ORDER — ROSUVASTATIN CALCIUM 5 MG/1
5 TABLET, COATED ORAL DAILY
Qty: 90 TABLET | Refills: 4 | Status: SHIPPED | OUTPATIENT
Start: 2025-07-17

## 2025-07-17 RX ORDER — ALBUTEROL SULFATE 90 UG/1
2 INHALANT RESPIRATORY (INHALATION) EVERY 6 HOURS PRN
Qty: 8 G | Refills: 0 | Status: SHIPPED | OUTPATIENT
Start: 2025-07-17

## 2025-07-17 RX ORDER — BUDESONIDE AND FORMOTEROL FUMARATE DIHYDRATE 80; 4.5 UG/1; UG/1
AEROSOL RESPIRATORY (INHALATION)
Qty: 20.4 G | Refills: 1 | Status: SHIPPED | OUTPATIENT
Start: 2025-07-17

## 2025-07-17 RX ORDER — MONTELUKAST SODIUM 10 MG/1
10 TABLET ORAL AT BEDTIME
Qty: 90 TABLET | Refills: 0 | Status: SHIPPED | OUTPATIENT
Start: 2025-07-17

## 2025-07-17 RX ORDER — FLUTICASONE PROPIONATE 50 MCG
1 SPRAY, SUSPENSION (ML) NASAL DAILY
Qty: 16 G | Refills: 3 | Status: SHIPPED | OUTPATIENT
Start: 2025-07-17

## 2025-07-17 SDOH — HEALTH STABILITY: PHYSICAL HEALTH: ON AVERAGE, HOW MANY DAYS PER WEEK DO YOU ENGAGE IN MODERATE TO STRENUOUS EXERCISE (LIKE A BRISK WALK)?: 3 DAYS

## 2025-07-17 ASSESSMENT — PAIN SCALES - GENERAL: PAINLEVEL_OUTOF10: MODERATE PAIN (5)

## 2025-07-17 ASSESSMENT — SOCIAL DETERMINANTS OF HEALTH (SDOH): HOW OFTEN DO YOU GET TOGETHER WITH FRIENDS OR RELATIVES?: TWICE A WEEK

## 2025-07-17 NOTE — PATIENT INSTRUCTIONS
Vaginal dryness:    Vaginal estrogen  RepHresh, Replens - OTC douche - rebalances pH      Patient Education   Preventive Care Advice   This is general advice given by our system to help you stay healthy. However, your care team may have specific advice just for you. Please talk to your care team about your preventive care needs.  Nutrition  Eat 5 or more servings of fruits and vegetables each day.  Try wheat bread, brown rice and whole grain pasta (instead of white bread, rice, and pasta).  Get enough calcium and vitamin D. Check the label on foods and aim for 100% of the RDA (recommended daily allowance).  Lifestyle  Exercise at least 150 minutes each week  (30 minutes a day, 5 days a week).  Do muscle strengthening activities 2 days a week. These help control your weight and prevent disease.  No smoking.  Wear sunscreen to prevent skin cancer.  Have a dental exam and cleaning every 6 months.  Yearly exams  See your health care team every year to talk about:  Any changes in your health.  Any medicines your care team has prescribed.  Preventive care, family planning, and ways to prevent chronic diseases.  Shots (vaccines)   HPV shots (up to age 26), if you've never had them before.  Hepatitis B shots (up to age 59), if you've never had them before.  COVID-19 shot: Get this shot when it's due.  Flu shot: Get a flu shot every year.  Tetanus shot: Get a tetanus shot every 10 years.  Pneumococcal, hepatitis A, and RSV shots: Ask your care team if you need these based on your risk.  Shingles shot (for age 50 and up)  General health tests  Diabetes screening:  Starting at age 35, Get screened for diabetes at least every 3 years.  If you are younger than age 35, ask your care team if you should be screened for diabetes.  Cholesterol test: At age 39, start having a cholesterol test every 5 years, or more often if advised.  Bone density scan (DEXA): At age 50, ask your care team if you should have this scan for osteoporosis  (brittle bones).  Hepatitis C: Get tested at least once in your life.  STIs (sexually transmitted infections)  Before age 24: Ask your care team if you should be screened for STIs.  After age 24: Get screened for STIs if you're at risk. You are at risk for STIs (including HIV) if:  You are sexually active with more than one person.  You don't use condoms every time.  You or a partner was diagnosed with a sexually transmitted infection.  If you are at risk for HIV, ask about PrEP medicine to prevent HIV.  Get tested for HIV at least once in your life, whether you are at risk for HIV or not.  Cancer screening tests  Cervical cancer screening: If you have a cervix, begin getting regular cervical cancer screening tests starting at age 21.  Breast cancer scan (mammogram): If you've ever had breasts, begin having regular mammograms starting at age 40. This is a scan to check for breast cancer.  Colon cancer screening: It is important to start screening for colon cancer at age 45.  Have a colonoscopy test every 10 years (or more often if you're at risk) Or, ask your provider about stool tests like a FIT test every year or Cologuard test every 3 years.  To learn more about your testing options, visit:   .  For help making a decision, visit:   https://bit.ly/yr86030.  Prostate cancer screening test: If you have a prostate, ask your care team if a prostate cancer screening test (PSA) at age 55 is right for you.  Lung cancer screening: If you are a current or former smoker ages 50 to 80, ask your care team if ongoing lung cancer screenings are right for you.  For informational purposes only. Not to replace the advice of your health care provider. Copyright   2023 [employer]. All rights reserved. Clinically reviewed by the [facility] Transitions Program. [employer] 584169 - REV 01/24.  9 Ways to Cut Back on Drinking  Maybe you've found yourself drinking more alcohol than you'd prefer. If you want to cut  "back, here are some ideas to try.    Think before you drink.  Do you really want a drink, or is it just a habit? If you're used to having a drink at a certain time, try doing something else then.     Look for substitutes.  Find some no-alcohol drinks that you enjoy, like flavored seltzer water, tea with honey, or tonic with a slice of lime. Or try alcohol-free beer or \"virgin\" cocktails (without the alcohol).     Drink more water.  Use water to quench your thirst. Drink a glass of water before you have any alcohol. Have another glass along with every drink or between drinks.     Shrink your drink.  For example, have a bottle of beer instead of a pint. Use a smaller glass for wine. Choose drinks with lower alcohol content (ABV%). Or use less liquor and more mixer in cocktails.     Slow down.  It's easy to drink quickly and without thinking about it. Pay attention, and make each drink last longer.     Do the math.  Total up how much you spend on alcohol each month. How much is that a year? If you cut back, what could you do with the money you save?     Take a break.  Choose a day or two each week when you won't drink at all. Notice how you feel on those days, physically and emotionally. How did you sleep? Do you feel better? Over time, add more break days.     Count calories.  Would you like to lose some weight? For some people that's a good motivator for cutting back. Figure out how many calories are in each drink. How many does that add up to in a day? In a week? In a month?     Practice saying no.  Be ready when someone offers you a drink. Try: \"Thanks, I've had enough.\" Or \"Thanks, but I'm cutting back.\" Or \"No, thanks. I feel better when I drink less.\"   Current as of: August 20, 2024  Content Version: 14.5 2024-2025 [employer].   Care instructions adapted under license by your healthcare professional. If you have questions about a medical condition or this instruction, always ask your healthcare " professional. [employer] [employer], [facility] disclaims any warranty or liability for your use of this information.

## 2025-07-29 NOTE — TELEPHONE ENCOUNTER
DIAGNOSIS: Left knee pain, inflamation     APPOINTMENT DATE: 7/31/25   NOTES STATUS DETAILS   OFFICE NOTE from other specialist Internal 12/8/22  EUGENIO Byrne   MEDICATION LIST Internal    XRAYS (IMAGES & REPORTS) Internal 12/8/22  XR Knee Left

## 2025-07-31 ENCOUNTER — PRE VISIT (OUTPATIENT)
Dept: ORTHOPEDICS | Facility: CLINIC | Age: 70
End: 2025-07-31

## 2025-08-06 ENCOUNTER — OFFICE VISIT (OUTPATIENT)
Dept: ORTHOPEDICS | Facility: CLINIC | Age: 70
End: 2025-08-06
Payer: COMMERCIAL

## 2025-08-06 ENCOUNTER — ANCILLARY PROCEDURE (OUTPATIENT)
Dept: GENERAL RADIOLOGY | Facility: CLINIC | Age: 70
End: 2025-08-06
Attending: FAMILY MEDICINE
Payer: COMMERCIAL

## 2025-08-06 DIAGNOSIS — M25.562 LEFT KNEE PAIN: Primary | ICD-10-CM

## 2025-08-06 DIAGNOSIS — M25.562 LEFT MEDIAL KNEE PAIN: Primary | ICD-10-CM

## 2025-08-06 PROCEDURE — 73562 X-RAY EXAM OF KNEE 3: CPT | Mod: LT | Performed by: RADIOLOGY

## 2025-08-06 PROCEDURE — 99214 OFFICE O/P EST MOD 30 MIN: CPT | Performed by: FAMILY MEDICINE

## 2025-08-20 ENCOUNTER — THERAPY VISIT (OUTPATIENT)
Dept: PHYSICAL THERAPY | Facility: CLINIC | Age: 70
End: 2025-08-20
Attending: FAMILY MEDICINE
Payer: COMMERCIAL

## 2025-08-20 DIAGNOSIS — M25.562 LEFT MEDIAL KNEE PAIN: ICD-10-CM

## 2025-08-20 DIAGNOSIS — M25.562 ACUTE PAIN OF LEFT KNEE: Primary | ICD-10-CM

## 2025-08-20 PROCEDURE — 97161 PT EVAL LOW COMPLEX 20 MIN: CPT | Mod: GP

## 2025-08-20 PROCEDURE — 97110 THERAPEUTIC EXERCISES: CPT | Mod: GP

## 2025-08-20 ASSESSMENT — ACTIVITIES OF DAILY LIVING (ADL)
STIFFNESS: THE SYMPTOM AFFECTS MY ACTIVITY SLIGHTLY
GO UP STAIRS: ACTIVITY IS SOMEWHAT DIFFICULT
KNEE_ACTIVITY_OF_DAILY_LIVING_SUM: 51
GIVING WAY, BUCKLING OR SHIFTING OF KNEE: I DO NOT HAVE THE SYMPTOM
SWELLING: THE SYMPTOM AFFECTS MY ACTIVITY SLIGHTLY
HOW_WOULD_YOU_RATE_THE_CURRENT_FUNCTION_OF_YOUR_KNEE_DURING_YOUR_USUAL_DAILY_ACTIVITIES_ON_A_SCALE_FROM_0_TO_100_WITH_100_BEING_YOUR_LEVEL_OF_KNEE_FUNCTION_PRIOR_TO_YOUR_INJURY_AND_0_BEING_THE_INABILITY_TO_PERFORM_ANY_OF_YOUR_USUAL_DAILY_ACTIVITIES?: 60
GO DOWN STAIRS: ACTIVITY IS SOMEWHAT DIFFICULT
WEAKNESS: I DO NOT HAVE THE SYMPTOM
LIMPING: THE SYMPTOM AFFECTS MY ACTIVITY MODERATELY
HOW_WOULD_YOU_RATE_THE_OVERALL_FUNCTION_OF_YOUR_KNEE_DURING_YOUR_USUAL_DAILY_ACTIVITIES?: ABNORMAL
WALK: ACTIVITY IS SOMEWHAT DIFFICULT
GO UP STAIRS: ACTIVITY IS SOMEWHAT DIFFICULT
AS_A_RESULT_OF_YOUR_KNEE_INJURY,_HOW_WOULD_YOU_RATE_YOUR_CURRENT_LEVEL_OF_DAILY_ACTIVITY?: ABNORMAL
RISE FROM A CHAIR: ACTIVITY IS MINIMALLY DIFFICULT
PLEASE_INDICATE_YOR_PRIMARY_REASON_FOR_REFERRAL_TO_THERAPY:: KNEE
STIFFNESS: THE SYMPTOM AFFECTS MY ACTIVITY SLIGHTLY
SQUAT: ACTIVITY IS MINIMALLY DIFFICULT
HOW_WOULD_YOU_RATE_THE_CURRENT_FUNCTION_OF_YOUR_KNEE_DURING_YOUR_USUAL_DAILY_ACTIVITIES_ON_A_SCALE_FROM_0_TO_100_WITH_100_BEING_YOUR_LEVEL_OF_KNEE_FUNCTION_PRIOR_TO_YOUR_INJURY_AND_0_BEING_THE_INABILITY_TO_PERFORM_ANY_OF_YOUR_USUAL_DAILY_ACTIVITIES?: 60
KNEE_ACTIVITY_OF_DAILY_LIVING_SCORE: 72.86
SWELLING: THE SYMPTOM AFFECTS MY ACTIVITY SLIGHTLY
KNEEL ON THE FRONT OF YOUR KNEE: ACTIVITY IS NOT DIFFICULT
RISE FROM A CHAIR: ACTIVITY IS MINIMALLY DIFFICULT
AS_A_RESULT_OF_YOUR_KNEE_INJURY,_HOW_WOULD_YOU_RATE_YOUR_CURRENT_LEVEL_OF_DAILY_ACTIVITY?: ABNORMAL
PAIN: THE SYMPTOM AFFECTS MY ACTIVITY MODERATELY
RAW_SCORE: 51
KNEEL ON THE FRONT OF YOUR KNEE: ACTIVITY IS NOT DIFFICULT
STAND: ACTIVITY IS MINIMALLY DIFFICULT
SIT WITH YOUR KNEE BENT: ACTIVITY IS NOT DIFFICULT
HOW_WOULD_YOU_RATE_THE_OVERALL_FUNCTION_OF_YOUR_KNEE_DURING_YOUR_USUAL_DAILY_ACTIVITIES?: ABNORMAL
GIVING WAY, BUCKLING OR SHIFTING OF KNEE: I DO NOT HAVE THE SYMPTOM
PAIN: THE SYMPTOM AFFECTS MY ACTIVITY MODERATELY
WALK: ACTIVITY IS SOMEWHAT DIFFICULT
WEAKNESS: I DO NOT HAVE THE SYMPTOM
STAND: ACTIVITY IS MINIMALLY DIFFICULT
SIT WITH YOUR KNEE BENT: ACTIVITY IS NOT DIFFICULT
GO DOWN STAIRS: ACTIVITY IS SOMEWHAT DIFFICULT
SQUAT: ACTIVITY IS MINIMALLY DIFFICULT
LIMPING: THE SYMPTOM AFFECTS MY ACTIVITY MODERATELY

## 2025-08-27 ENCOUNTER — THERAPY VISIT (OUTPATIENT)
Dept: PHYSICAL THERAPY | Facility: CLINIC | Age: 70
End: 2025-08-27
Payer: COMMERCIAL

## 2025-08-27 DIAGNOSIS — M25.562 ACUTE PAIN OF LEFT KNEE: Primary | ICD-10-CM

## 2025-08-27 PROCEDURE — 97110 THERAPEUTIC EXERCISES: CPT | Mod: GP

## 2025-08-29 ENCOUNTER — THERAPY VISIT (OUTPATIENT)
Dept: PHYSICAL THERAPY | Facility: CLINIC | Age: 70
End: 2025-08-29
Payer: COMMERCIAL

## 2025-08-29 DIAGNOSIS — M25.562 ACUTE PAIN OF LEFT KNEE: Primary | ICD-10-CM

## 2025-08-29 PROCEDURE — 97110 THERAPEUTIC EXERCISES: CPT | Mod: GP

## (undated) DEVICE — PACK CATARACT CUSTOM ASC SEY15CPUMC

## (undated) DEVICE — SU ETHILON 4-0 PS-2 18" BLACK 1667H

## (undated) DEVICE — GLOVE PROTEXIS BLUE W/NEU-THERA 6.5  2D73EB65

## (undated) DEVICE — EYE KNIFE STILETTO VISITEC 1.1MM ANG 45DEG SIDEPORT 376620

## (undated) DEVICE — PACK HAND CUSTOM ASC

## (undated) DEVICE — GLOVE PROTEXIS POWDER FREE SMT 6.5  2D72PT65X

## (undated) DEVICE — EYE TIP IRRIGATION & ASPIRATION POLYMER CVD 0.3MM 8065751512

## (undated) DEVICE — PREP CHLORAPREP 26ML TINTED ORANGE  260815

## (undated) DEVICE — LINEN TOWEL PACK X5 5464

## (undated) DEVICE — EYE KNIFE SLIT XSTAR VISITEC 2.6MM 45DEG 373726

## (undated) DEVICE — EYE CANN IRR 25GA CYSTOTOME 581610

## (undated) DEVICE — EYE SHIELD PLASTIC

## (undated) DEVICE — BNDG KLING 1" 2230

## (undated) DEVICE — GLOVE PROTEXIS MICRO 6.5  2D73PM65

## (undated) DEVICE — EYE CANN IRR 27GA ANTERIOR CHAMBER 581280

## (undated) DEVICE — EYE PACK CUSTOM ANTERIOR 30DEG TIP CENTURION PPK6682-04

## (undated) DEVICE — PAD CHUX UNDERPAD 30X30"

## (undated) DEVICE — DRSG GAUZE 2X2" 8042

## (undated) DEVICE — SPECIMEN CONTAINER 60MLW/10% FORMALIN 59601

## (undated) DEVICE — BRUSH SURGICAL SCRUB W/4% CHG SOL 25ML 371073

## (undated) DEVICE — ESU HOLSTER PLASTIC DISP E2400

## (undated) DEVICE — SOL NACL 0.9% IRRIG 500ML BOTTLE 2F7123

## (undated) DEVICE — LINEN ORTHO PACK 5446

## (undated) DEVICE — Device

## (undated) RX ORDER — LIDOCAINE HYDROCHLORIDE 10 MG/ML
INJECTION, SOLUTION EPIDURAL; INFILTRATION; INTRACAUDAL; PERINEURAL
Status: DISPENSED
Start: 2024-05-13

## (undated) RX ORDER — FENTANYL CITRATE 50 UG/ML
INJECTION, SOLUTION INTRAMUSCULAR; INTRAVENOUS
Status: DISPENSED
Start: 2023-06-22

## (undated) RX ORDER — BUPIVACAINE HYDROCHLORIDE 5 MG/ML
INJECTION, SOLUTION EPIDURAL; INTRACAUDAL
Status: DISPENSED
Start: 2019-12-12

## (undated) RX ORDER — GABAPENTIN 300 MG/1
CAPSULE ORAL
Status: DISPENSED
Start: 2019-03-08

## (undated) RX ORDER — ONDANSETRON 2 MG/ML
INJECTION INTRAMUSCULAR; INTRAVENOUS
Status: DISPENSED
Start: 2019-03-01

## (undated) RX ORDER — FENTANYL CITRATE 50 UG/ML
INJECTION, SOLUTION INTRAMUSCULAR; INTRAVENOUS
Status: DISPENSED
Start: 2019-03-08

## (undated) RX ORDER — ONDANSETRON 2 MG/ML
INJECTION INTRAMUSCULAR; INTRAVENOUS
Status: DISPENSED
Start: 2019-01-03

## (undated) RX ORDER — ONDANSETRON 2 MG/ML
INJECTION INTRAMUSCULAR; INTRAVENOUS
Status: DISPENSED
Start: 2019-03-08

## (undated) RX ORDER — DEXAMETHASONE SODIUM PHOSPHATE 4 MG/ML
INJECTION, SOLUTION INTRA-ARTICULAR; INTRALESIONAL; INTRAMUSCULAR; INTRAVENOUS; SOFT TISSUE
Status: DISPENSED
Start: 2019-03-01

## (undated) RX ORDER — TRIAMCINOLONE ACETONIDE 40 MG/ML
INJECTION, SUSPENSION INTRA-ARTICULAR; INTRAMUSCULAR
Status: DISPENSED
Start: 2024-05-13

## (undated) RX ORDER — ACETAMINOPHEN 325 MG/1
TABLET ORAL
Status: DISPENSED
Start: 2019-03-01

## (undated) RX ORDER — ACETAMINOPHEN 325 MG/1
TABLET ORAL
Status: DISPENSED
Start: 2019-03-08

## (undated) RX ORDER — LIDOCAINE HYDROCHLORIDE 10 MG/ML
INJECTION, SOLUTION EPIDURAL; INFILTRATION; INTRACAUDAL; PERINEURAL
Status: DISPENSED
Start: 2019-12-12

## (undated) RX ORDER — FENTANYL CITRATE 50 UG/ML
INJECTION, SOLUTION INTRAMUSCULAR; INTRAVENOUS
Status: DISPENSED
Start: 2019-03-01

## (undated) RX ORDER — FENTANYL CITRATE 50 UG/ML
INJECTION, SOLUTION INTRAMUSCULAR; INTRAVENOUS
Status: DISPENSED
Start: 2019-01-03